# Patient Record
Sex: FEMALE | Race: OTHER | HISPANIC OR LATINO
[De-identification: names, ages, dates, MRNs, and addresses within clinical notes are randomized per-mention and may not be internally consistent; named-entity substitution may affect disease eponyms.]

---

## 2021-01-28 DIAGNOSIS — Z01.818 ENCOUNTER FOR OTHER PREPROCEDURAL EXAMINATION: ICD-10-CM

## 2021-01-28 PROBLEM — Z00.00 ENCOUNTER FOR PREVENTIVE HEALTH EXAMINATION: Status: ACTIVE | Noted: 2021-01-28

## 2021-01-29 ENCOUNTER — APPOINTMENT (OUTPATIENT)
Dept: DISASTER EMERGENCY | Facility: CLINIC | Age: 71
End: 2021-01-29

## 2021-01-29 VITALS
SYSTOLIC BLOOD PRESSURE: 127 MMHG | HEIGHT: 66 IN | HEART RATE: 72 BPM | RESPIRATION RATE: 20 BRPM | WEIGHT: 181.44 LBS | DIASTOLIC BLOOD PRESSURE: 78 MMHG | TEMPERATURE: 98 F | OXYGEN SATURATION: 98 %

## 2021-01-30 LAB — SARS-COV-2 N GENE NPH QL NAA+PROBE: NOT DETECTED

## 2021-01-31 ENCOUNTER — TRANSCRIPTION ENCOUNTER (OUTPATIENT)
Age: 71
End: 2021-01-31

## 2021-02-01 ENCOUNTER — INPATIENT (INPATIENT)
Facility: HOSPITAL | Age: 71
LOS: 3 days | Discharge: HOME | DRG: 454 | End: 2021-02-05
Attending: ORTHOPAEDIC SURGERY | Admitting: ORTHOPAEDIC SURGERY
Payer: MEDICARE

## 2021-02-01 DIAGNOSIS — I10 ESSENTIAL (PRIMARY) HYPERTENSION: ICD-10-CM

## 2021-02-01 DIAGNOSIS — M48.061 SPINAL STENOSIS, LUMBAR REGION WITHOUT NEUROGENIC CLAUDICATION: ICD-10-CM

## 2021-02-01 DIAGNOSIS — K63.5 POLYP OF COLON: ICD-10-CM

## 2021-02-01 DIAGNOSIS — E78.5 HYPERLIPIDEMIA, UNSPECIFIED: ICD-10-CM

## 2021-02-01 DIAGNOSIS — Z98.890 OTHER SPECIFIED POSTPROCEDURAL STATES: Chronic | ICD-10-CM

## 2021-02-01 DIAGNOSIS — E11.9 TYPE 2 DIABETES MELLITUS WITHOUT COMPLICATIONS: ICD-10-CM

## 2021-02-01 DIAGNOSIS — Z90.710 ACQUIRED ABSENCE OF BOTH CERVIX AND UTERUS: Chronic | ICD-10-CM

## 2021-02-01 DIAGNOSIS — Z90.49 ACQUIRED ABSENCE OF OTHER SPECIFIED PARTS OF DIGESTIVE TRACT: Chronic | ICD-10-CM

## 2021-02-01 LAB
GLUCOSE BLDC GLUCOMTR-MCNC: 147 MG/DL — HIGH (ref 70–99)
GLUCOSE BLDC GLUCOMTR-MCNC: 228 MG/DL — HIGH (ref 70–99)
GLUCOSE BLDC GLUCOMTR-MCNC: 253 MG/DL — HIGH (ref 70–99)
GLUCOSE BLDC GLUCOMTR-MCNC: 88 MG/DL — SIGNIFICANT CHANGE UP (ref 70–99)

## 2021-02-01 RX ORDER — DEXTROSE 50 % IN WATER 50 %
12.5 SYRINGE (ML) INTRAVENOUS ONCE
Refills: 0 | Status: DISCONTINUED | OUTPATIENT
Start: 2021-02-01 | End: 2021-02-01

## 2021-02-01 RX ORDER — INSULIN LISPRO 100/ML
VIAL (ML) SUBCUTANEOUS ONCE
Refills: 0 | Status: DISCONTINUED | OUTPATIENT
Start: 2021-02-01 | End: 2021-02-01

## 2021-02-01 RX ORDER — DEXTROSE 50 % IN WATER 50 %
25 SYRINGE (ML) INTRAVENOUS ONCE
Refills: 0 | Status: DISCONTINUED | OUTPATIENT
Start: 2021-02-01 | End: 2021-02-05

## 2021-02-01 RX ORDER — DEXTROSE 50 % IN WATER 50 %
15 SYRINGE (ML) INTRAVENOUS ONCE
Refills: 0 | Status: DISCONTINUED | OUTPATIENT
Start: 2021-02-01 | End: 2021-02-05

## 2021-02-01 RX ORDER — BUPIVACAINE 13.3 MG/ML
20 INJECTION, SUSPENSION, LIPOSOMAL INFILTRATION ONCE
Refills: 0 | Status: DISCONTINUED | OUTPATIENT
Start: 2021-02-01 | End: 2021-02-05

## 2021-02-01 RX ORDER — DEXTROSE 50 % IN WATER 50 %
15 SYRINGE (ML) INTRAVENOUS ONCE
Refills: 0 | Status: DISCONTINUED | OUTPATIENT
Start: 2021-02-01 | End: 2021-02-01

## 2021-02-01 RX ORDER — DEXTROSE 50 % IN WATER 50 %
25 SYRINGE (ML) INTRAVENOUS ONCE
Refills: 0 | Status: DISCONTINUED | OUTPATIENT
Start: 2021-02-01 | End: 2021-02-01

## 2021-02-01 RX ORDER — ONDANSETRON 8 MG/1
4 TABLET, FILM COATED ORAL EVERY 6 HOURS
Refills: 0 | Status: DISCONTINUED | OUTPATIENT
Start: 2021-02-01 | End: 2021-02-01

## 2021-02-01 RX ORDER — ATORVASTATIN CALCIUM 80 MG/1
40 TABLET, FILM COATED ORAL DAILY
Refills: 0 | Status: DISCONTINUED | OUTPATIENT
Start: 2021-02-01 | End: 2021-02-01

## 2021-02-01 RX ORDER — DEXTROSE 50 % IN WATER 50 %
12.5 SYRINGE (ML) INTRAVENOUS ONCE
Refills: 0 | Status: DISCONTINUED | OUTPATIENT
Start: 2021-02-01 | End: 2021-02-05

## 2021-02-01 RX ORDER — OXYCODONE HYDROCHLORIDE 5 MG/1
5 TABLET ORAL EVERY 4 HOURS
Refills: 0 | Status: DISCONTINUED | OUTPATIENT
Start: 2021-02-01 | End: 2021-02-01

## 2021-02-01 RX ORDER — METHOCARBAMOL 500 MG/1
500 TABLET, FILM COATED ORAL EVERY 8 HOURS
Refills: 0 | Status: DISCONTINUED | OUTPATIENT
Start: 2021-02-01 | End: 2021-02-05

## 2021-02-01 RX ORDER — HYDROMORPHONE HYDROCHLORIDE 2 MG/ML
0.5 INJECTION INTRAMUSCULAR; INTRAVENOUS; SUBCUTANEOUS
Refills: 0 | Status: COMPLETED | OUTPATIENT
Start: 2021-02-01 | End: 2021-02-01

## 2021-02-01 RX ORDER — GABAPENTIN 400 MG/1
600 CAPSULE ORAL THREE TIMES A DAY
Refills: 0 | Status: DISCONTINUED | OUTPATIENT
Start: 2021-02-01 | End: 2021-02-05

## 2021-02-01 RX ORDER — NALOXONE HYDROCHLORIDE 4 MG/.1ML
0.1 SPRAY NASAL
Refills: 0 | Status: DISCONTINUED | OUTPATIENT
Start: 2021-02-01 | End: 2021-02-05

## 2021-02-01 RX ORDER — ATORVASTATIN CALCIUM 80 MG/1
40 TABLET, FILM COATED ORAL AT BEDTIME
Refills: 0 | Status: DISCONTINUED | OUTPATIENT
Start: 2021-02-01 | End: 2021-02-05

## 2021-02-01 RX ORDER — HYDROMORPHONE HYDROCHLORIDE 2 MG/ML
0.5 INJECTION INTRAMUSCULAR; INTRAVENOUS; SUBCUTANEOUS
Refills: 0 | Status: DISCONTINUED | OUTPATIENT
Start: 2021-02-01 | End: 2021-02-02

## 2021-02-01 RX ORDER — CEFAZOLIN SODIUM 1 G
2000 VIAL (EA) INJECTION ONCE
Refills: 0 | Status: DISCONTINUED | OUTPATIENT
Start: 2021-02-01 | End: 2021-02-01

## 2021-02-01 RX ORDER — PANTOPRAZOLE SODIUM 20 MG/1
40 TABLET, DELAYED RELEASE ORAL
Refills: 0 | Status: DISCONTINUED | OUTPATIENT
Start: 2021-02-01 | End: 2021-02-05

## 2021-02-01 RX ORDER — PROCHLORPERAZINE MALEATE 5 MG
10 TABLET ORAL EVERY 8 HOURS
Refills: 0 | Status: DISCONTINUED | OUTPATIENT
Start: 2021-02-01 | End: 2021-02-05

## 2021-02-01 RX ORDER — CEFAZOLIN SODIUM 1 G
2000 VIAL (EA) INJECTION EVERY 8 HOURS
Refills: 0 | Status: COMPLETED | OUTPATIENT
Start: 2021-02-01 | End: 2021-02-01

## 2021-02-01 RX ORDER — ACETAMINOPHEN 500 MG
1000 TABLET ORAL ONCE
Refills: 0 | Status: DISCONTINUED | OUTPATIENT
Start: 2021-02-01 | End: 2021-02-01

## 2021-02-01 RX ORDER — ACETAMINOPHEN 500 MG
1000 TABLET ORAL EVERY 8 HOURS
Refills: 0 | Status: DISCONTINUED | OUTPATIENT
Start: 2021-02-01 | End: 2021-02-05

## 2021-02-01 RX ORDER — HYDROMORPHONE HYDROCHLORIDE 2 MG/ML
0.5 INJECTION INTRAMUSCULAR; INTRAVENOUS; SUBCUTANEOUS EVERY 4 HOURS
Refills: 0 | Status: DISCONTINUED | OUTPATIENT
Start: 2021-02-01 | End: 2021-02-01

## 2021-02-01 RX ORDER — SODIUM CHLORIDE 9 MG/ML
1000 INJECTION, SOLUTION INTRAVENOUS
Refills: 0 | Status: DISCONTINUED | OUTPATIENT
Start: 2021-02-01 | End: 2021-02-05

## 2021-02-01 RX ORDER — SODIUM CHLORIDE 9 MG/ML
1000 INJECTION, SOLUTION INTRAVENOUS
Refills: 0 | Status: DISCONTINUED | OUTPATIENT
Start: 2021-02-01 | End: 2021-02-03

## 2021-02-01 RX ORDER — SODIUM CHLORIDE 9 MG/ML
500 INJECTION, SOLUTION INTRAVENOUS ONCE
Refills: 0 | Status: COMPLETED | OUTPATIENT
Start: 2021-02-01 | End: 2021-02-01

## 2021-02-01 RX ORDER — HYDROMORPHONE HYDROCHLORIDE 2 MG/ML
30 INJECTION INTRAMUSCULAR; INTRAVENOUS; SUBCUTANEOUS
Refills: 0 | Status: DISCONTINUED | OUTPATIENT
Start: 2021-02-01 | End: 2021-02-02

## 2021-02-01 RX ORDER — ONDANSETRON 8 MG/1
4 TABLET, FILM COATED ORAL EVERY 6 HOURS
Refills: 0 | Status: DISCONTINUED | OUTPATIENT
Start: 2021-02-01 | End: 2021-02-05

## 2021-02-01 RX ORDER — PROCHLORPERAZINE MALEATE 5 MG
10 TABLET ORAL ONCE
Refills: 0 | Status: DISCONTINUED | OUTPATIENT
Start: 2021-02-01 | End: 2021-02-05

## 2021-02-01 RX ORDER — INSULIN LISPRO 100/ML
VIAL (ML) SUBCUTANEOUS
Refills: 0 | Status: DISCONTINUED | OUTPATIENT
Start: 2021-02-01 | End: 2021-02-05

## 2021-02-01 RX ORDER — GLUCAGON INJECTION, SOLUTION 0.5 MG/.1ML
1 INJECTION, SOLUTION SUBCUTANEOUS ONCE
Refills: 0 | Status: DISCONTINUED | OUTPATIENT
Start: 2021-02-01 | End: 2021-02-05

## 2021-02-01 RX ADMIN — SODIUM CHLORIDE 2000 MILLILITER(S): 9 INJECTION, SOLUTION INTRAVENOUS at 11:45

## 2021-02-01 RX ADMIN — Medication 1000 MILLIGRAM(S): at 22:59

## 2021-02-01 RX ADMIN — Medication 4: at 22:48

## 2021-02-01 RX ADMIN — HYDROMORPHONE HYDROCHLORIDE 30 MILLILITER(S): 2 INJECTION INTRAMUSCULAR; INTRAVENOUS; SUBCUTANEOUS at 16:38

## 2021-02-01 RX ADMIN — HYDROMORPHONE HYDROCHLORIDE 0.5 MILLIGRAM(S): 2 INJECTION INTRAMUSCULAR; INTRAVENOUS; SUBCUTANEOUS at 13:36

## 2021-02-01 RX ADMIN — Medication 100 MILLIGRAM(S): at 22:59

## 2021-02-01 RX ADMIN — Medication 6: at 16:49

## 2021-02-01 RX ADMIN — Medication 100 MILLIGRAM(S): at 16:00

## 2021-02-01 RX ADMIN — METHOCARBAMOL 500 MILLIGRAM(S): 500 TABLET, FILM COATED ORAL at 22:59

## 2021-02-01 RX ADMIN — ATORVASTATIN CALCIUM 40 MILLIGRAM(S): 80 TABLET, FILM COATED ORAL at 22:59

## 2021-02-01 RX ADMIN — HYDROMORPHONE HYDROCHLORIDE 0.5 MILLIGRAM(S): 2 INJECTION INTRAMUSCULAR; INTRAVENOUS; SUBCUTANEOUS at 12:15

## 2021-02-01 NOTE — H&P ADULT - HISTORY OF PRESENT ILLNESS
70F with low back pain x several months presents for surgery today. Pt notes she has pain down bilateral lower extremities, right greater than left. She ambulates with no assistance at baseline. She has tried PT and injections that do not improve her pain. Pt endorses intermittent numbness and tingling to her legs that comes and goes. She presents for L5-S1 Lami, PSF today.

## 2021-02-01 NOTE — H&P ADULT - NSICDXPASTSURGICALHX_GEN_ALL_CORE_FT
PAST SURGICAL HISTORY:  H/O abdominal hysterectomy 30y ago    History of appendectomy 2012    History of back surgery 2011, lumbar

## 2021-02-01 NOTE — PROGRESS NOTE ADULT - SUBJECTIVE AND OBJECTIVE BOX
Ortho Post Op Check    Procedure: L5-S1 lami/PLIF  Surgeon: Dr. Evangelista     Patient seen and examined in the PACU   Patient reports pain localized to the lumbar spine   Denies CP, SOB, N/V, numbness/tingling     Vital Signs Last 24 Hrs  T(C): 36.4 (02-01-21 @ 11:28), Max: 36.4 (02-01-21 @ 11:28)  T(F): 97.5 (02-01-21 @ 11:28), Max: 97.5 (02-01-21 @ 11:28)  HR: 68 (02-01-21 @ 13:28) (67 - 73)  BP: 108/55 (02-01-21 @ 13:28) (82/47 - 111/57)  BP(mean): 74 (02-01-21 @ 13:28) (60 - 80)  RR: 13 (02-01-21 @ 13:28) (13 - 16)  SpO2: 99% (02-01-21 @ 13:28) (95% - 99%)  AVSS    General: Pt Alert and oriented, NAD  Dressing C/D/I: lumbar dressing in tact- HV x2   Fisher in place   Pulses: 2+ DP pulses in tact   Sensation: intact to light touch   Motor: EHL/FHL/TA/GS 5/5 bilateral LE         A/P: 70yFemale POD#0 s/p L5-S1 lami/PLIF     - Stable in PACU   - c/w IV and PO Pain Control  - DVT ppx: SCDs  - Post op abx: ancef 2g IV x2 post op   - PT, WBS: WBAT   -monitor drain outputs     Ortho Pager [phone]

## 2021-02-01 NOTE — PROGRESS NOTE ADULT - SUBJECTIVE AND OBJECTIVE BOX
Consulted called to Dr. Soliman Pain management     Recommendations recieved over the phone  Patient will be formally seen by Pain management in AM    Overnight recs:  PCA pump: 0.2mg demand dose; 6 min lockout; 0.5mg breakthrough dosing q2 hrs; 8mg 4 hr limit,   c/w home gabapentin 600mg 3x daily PRN     Patient seen and examined by myself at bedside in PACU  see previous note

## 2021-02-01 NOTE — H&P ADULT - NSHPPHYSICALEXAM_GEN_ALL_CORE
PE: Decreased ROM to lumbar spine secondary to pain.  MSK: No deformity or open wounds. No rashes or lesions. EHL/TA/GS/FHL 5/5 BLE. Sensation intact and equal BLE. Skin warm and well perfused. DP palpable BLE. Cap refill brisk.   Rest of PE per medical clearance.

## 2021-02-01 NOTE — H&P ADULT - NSICDXPASTMEDICALHX_GEN_ALL_CORE_FT
PAST MEDICAL HISTORY:  Colon polyp     Diabetes     HLD (hyperlipidemia)     HTN (hypertension)

## 2021-02-02 ENCOUNTER — TRANSCRIPTION ENCOUNTER (OUTPATIENT)
Age: 71
End: 2021-02-02

## 2021-02-02 DIAGNOSIS — E11.9 TYPE 2 DIABETES MELLITUS WITHOUT COMPLICATIONS: ICD-10-CM

## 2021-02-02 DIAGNOSIS — E78.00 PURE HYPERCHOLESTEROLEMIA, UNSPECIFIED: ICD-10-CM

## 2021-02-02 DIAGNOSIS — D62 ACUTE POSTHEMORRHAGIC ANEMIA: ICD-10-CM

## 2021-02-02 DIAGNOSIS — Z98.890 OTHER SPECIFIED POSTPROCEDURAL STATES: ICD-10-CM

## 2021-02-02 DIAGNOSIS — I10 ESSENTIAL (PRIMARY) HYPERTENSION: ICD-10-CM

## 2021-02-02 LAB
A1C WITH ESTIMATED AVERAGE GLUCOSE RESULT: 7.7 % — HIGH (ref 4–5.6)
ANION GAP SERPL CALC-SCNC: 11 MMOL/L — SIGNIFICANT CHANGE UP (ref 5–17)
BASOPHILS # BLD AUTO: 0.02 K/UL — SIGNIFICANT CHANGE UP (ref 0–0.2)
BASOPHILS NFR BLD AUTO: 0.2 % — SIGNIFICANT CHANGE UP (ref 0–2)
BUN SERPL-MCNC: 9 MG/DL — SIGNIFICANT CHANGE UP (ref 7–23)
CALCIUM SERPL-MCNC: 8.5 MG/DL — SIGNIFICANT CHANGE UP (ref 8.4–10.5)
CHLORIDE SERPL-SCNC: 105 MMOL/L — SIGNIFICANT CHANGE UP (ref 96–108)
CO2 SERPL-SCNC: 25 MMOL/L — SIGNIFICANT CHANGE UP (ref 22–31)
CREAT SERPL-MCNC: 0.69 MG/DL — SIGNIFICANT CHANGE UP (ref 0.5–1.3)
EOSINOPHIL # BLD AUTO: 0 K/UL — SIGNIFICANT CHANGE UP (ref 0–0.5)
EOSINOPHIL NFR BLD AUTO: 0 % — SIGNIFICANT CHANGE UP (ref 0–6)
ESTIMATED AVERAGE GLUCOSE: 174 MG/DL — HIGH (ref 68–114)
GLUCOSE BLDC GLUCOMTR-MCNC: 138 MG/DL — HIGH (ref 70–99)
GLUCOSE BLDC GLUCOMTR-MCNC: 198 MG/DL — HIGH (ref 70–99)
GLUCOSE BLDC GLUCOMTR-MCNC: 217 MG/DL — HIGH (ref 70–99)
GLUCOSE BLDC GLUCOMTR-MCNC: 290 MG/DL — HIGH (ref 70–99)
GLUCOSE SERPL-MCNC: 136 MG/DL — HIGH (ref 70–99)
HCT VFR BLD CALC: 29.4 % — LOW (ref 34.5–45)
HGB BLD-MCNC: 9.8 G/DL — LOW (ref 11.5–15.5)
IMM GRANULOCYTES NFR BLD AUTO: 0.5 % — SIGNIFICANT CHANGE UP (ref 0–1.5)
LYMPHOCYTES # BLD AUTO: 1.69 K/UL — SIGNIFICANT CHANGE UP (ref 1–3.3)
LYMPHOCYTES # BLD AUTO: 16.9 % — SIGNIFICANT CHANGE UP (ref 13–44)
MCHC RBC-ENTMCNC: 31.7 PG — SIGNIFICANT CHANGE UP (ref 27–34)
MCHC RBC-ENTMCNC: 33.3 GM/DL — SIGNIFICANT CHANGE UP (ref 32–36)
MCV RBC AUTO: 95.1 FL — SIGNIFICANT CHANGE UP (ref 80–100)
MONOCYTES # BLD AUTO: 0.77 K/UL — SIGNIFICANT CHANGE UP (ref 0–0.9)
MONOCYTES NFR BLD AUTO: 7.7 % — SIGNIFICANT CHANGE UP (ref 2–14)
NEUTROPHILS # BLD AUTO: 7.48 K/UL — HIGH (ref 1.8–7.4)
NEUTROPHILS NFR BLD AUTO: 74.7 % — SIGNIFICANT CHANGE UP (ref 43–77)
NRBC # BLD: 0 /100 WBCS — SIGNIFICANT CHANGE UP (ref 0–0)
PLATELET # BLD AUTO: 217 K/UL — SIGNIFICANT CHANGE UP (ref 150–400)
POTASSIUM SERPL-MCNC: 4 MMOL/L — SIGNIFICANT CHANGE UP (ref 3.5–5.3)
POTASSIUM SERPL-SCNC: 4 MMOL/L — SIGNIFICANT CHANGE UP (ref 3.5–5.3)
RBC # BLD: 3.09 M/UL — LOW (ref 3.8–5.2)
RBC # FLD: 13 % — SIGNIFICANT CHANGE UP (ref 10.3–14.5)
SODIUM SERPL-SCNC: 141 MMOL/L — SIGNIFICANT CHANGE UP (ref 135–145)
WBC # BLD: 10.01 K/UL — SIGNIFICANT CHANGE UP (ref 3.8–10.5)
WBC # FLD AUTO: 10.01 K/UL — SIGNIFICANT CHANGE UP (ref 3.8–10.5)

## 2021-02-02 PROCEDURE — 99223 1ST HOSP IP/OBS HIGH 75: CPT | Mod: GC

## 2021-02-02 RX ORDER — HYDROMORPHONE HYDROCHLORIDE 2 MG/ML
0.5 INJECTION INTRAMUSCULAR; INTRAVENOUS; SUBCUTANEOUS EVERY 4 HOURS
Refills: 0 | Status: DISCONTINUED | OUTPATIENT
Start: 2021-02-02 | End: 2021-02-05

## 2021-02-02 RX ORDER — DIPHENHYDRAMINE HCL 50 MG
25 CAPSULE ORAL ONCE
Refills: 0 | Status: COMPLETED | OUTPATIENT
Start: 2021-02-02 | End: 2021-02-02

## 2021-02-02 RX ORDER — OXYCODONE HYDROCHLORIDE 5 MG/1
10 TABLET ORAL EVERY 4 HOURS
Refills: 0 | Status: DISCONTINUED | OUTPATIENT
Start: 2021-02-02 | End: 2021-02-03

## 2021-02-02 RX ORDER — OXYCODONE HYDROCHLORIDE 5 MG/1
5 TABLET ORAL EVERY 4 HOURS
Refills: 0 | Status: DISCONTINUED | OUTPATIENT
Start: 2021-02-02 | End: 2021-02-03

## 2021-02-02 RX ADMIN — PANTOPRAZOLE SODIUM 40 MILLIGRAM(S): 20 TABLET, DELAYED RELEASE ORAL at 04:55

## 2021-02-02 RX ADMIN — ATORVASTATIN CALCIUM 40 MILLIGRAM(S): 80 TABLET, FILM COATED ORAL at 22:48

## 2021-02-02 RX ADMIN — METHOCARBAMOL 500 MILLIGRAM(S): 500 TABLET, FILM COATED ORAL at 13:12

## 2021-02-02 RX ADMIN — METHOCARBAMOL 500 MILLIGRAM(S): 500 TABLET, FILM COATED ORAL at 04:56

## 2021-02-02 RX ADMIN — Medication 4: at 22:35

## 2021-02-02 RX ADMIN — Medication 6: at 17:25

## 2021-02-02 RX ADMIN — Medication 1000 MILLIGRAM(S): at 22:48

## 2021-02-02 RX ADMIN — Medication 1000 MILLIGRAM(S): at 13:12

## 2021-02-02 RX ADMIN — Medication 2: at 12:24

## 2021-02-02 RX ADMIN — OXYCODONE HYDROCHLORIDE 5 MILLIGRAM(S): 5 TABLET ORAL at 22:48

## 2021-02-02 RX ADMIN — Medication 25 MILLIGRAM(S): at 14:29

## 2021-02-02 RX ADMIN — Medication 1000 MILLIGRAM(S): at 04:55

## 2021-02-02 RX ADMIN — METHOCARBAMOL 500 MILLIGRAM(S): 500 TABLET, FILM COATED ORAL at 22:48

## 2021-02-02 RX ADMIN — OXYCODONE HYDROCHLORIDE 5 MILLIGRAM(S): 5 TABLET ORAL at 17:27

## 2021-02-02 NOTE — PHYSICAL THERAPY INITIAL EVALUATION ADULT - PERTINENT HX OF CURRENT PROBLEM, REHAB EVAL
70F with low back pain x several months presents for surgery today. Pt notes she has pain down bilateral lower extremities, right greater than left. She ambulates with no assistance at baseline. She has tried PT and injections that do not improve her pain. Pt endorses intermittent numbness and tingling to her legs that comes and goes.

## 2021-02-02 NOTE — DISCHARGE NOTE PROVIDER - NSDCCPCAREPLAN_GEN_ALL_CORE_FT
PRINCIPAL DISCHARGE DIAGNOSIS  Diagnosis: Spinal stenosis of lumbar region, unspecified whether neurogenic claudication present  Assessment and Plan of Treatment: improvement s/p Lami/PLIF L5-S1

## 2021-02-02 NOTE — PHYSICAL THERAPY INITIAL EVALUATION ADULT - CRITERIA FOR SKILLED THERAPEUTIC INTERVENTIONS
impairments found/functional limitations in following categories/risk reduction/prevention/therapy frequency/predicted duration of therapy intervention/anticipated equipment needs at discharge

## 2021-02-02 NOTE — PROGRESS NOTE ADULT - SUBJECTIVE AND OBJECTIVE BOX
Ortho Note    Procedure: L5-S1 lami/PLIF  Surgeon: Dr. Evangelista     pt seen and examined at bedside   no issues overnight  endorsing some mild pain relieved w oral analgesia   Denies CP, SOB, N/V, numbness/tingling     Vital Signs Last 24 Hrs  T(C): 36.4 (02-01-21 @ 11:28), Max: 36.4 (02-01-21 @ 11:28)  T(F): 97.5 (02-01-21 @ 11:28), Max: 97.5 (02-01-21 @ 11:28)  HR: 68 (02-01-21 @ 13:28) (67 - 73)  BP: 108/55 (02-01-21 @ 13:28) (82/47 - 111/57)  BP(mean): 74 (02-01-21 @ 13:28) (60 - 80)  RR: 13 (02-01-21 @ 13:28) (13 - 16)  SpO2: 99% (02-01-21 @ 13:28) (95% - 99%)  AVSS    General: Pt Alert and oriented, NAD  Dressing C/D/I: lumbar dressing in tact- HV x2   Fisher in place   Pulses: 2+ DP pulses in tact   Sensation: intact to light touch   Motor: EHL/FHL/TA/GS 5/5 bilateral LE         A/P: 70yFemale POD#1 s/p L5-S1 lami/PLIF     - Stable in PACU   - c/w IV and PO Pain Control  - DVT ppx: SCDs  - Post op abx: ancef 2g IV x2 post op   - PT, WBS: WBAT   -monitor drain outputs     Ortho Pager [phone]

## 2021-02-02 NOTE — DISCHARGE NOTE PROVIDER - NSDCFUADDINST_GEN_ALL_CORE_FT
No strenuous activity (bending/twisting), heavy lifting, driving or returning to work until cleared by MD.  Change dressing daily with gauze/tape till post-op day #5, then leave incision open to air.  You may shower post-op day#5, keep incision clean and dry.   Try to have regular bowel movements, take stool softener or laxative if necessary.  May apply ice to affected areas to decrease swelling.  Call to schedule an appt with Dr. Evangelista for follow up, if you have staples or sutures they will be removed in office.  Contact your doctor if you experience: fever greater than 101.5, chills, chest pain, difficulty breathing, redness or excessive drainage around the incision, other concerns.  Follow up with your primary care provider.

## 2021-02-02 NOTE — DISCHARGE NOTE PROVIDER - NSDCMRMEDTOKEN_GEN_ALL_CORE_FT
atorvastatin 40 mg oral tablet: 1 tab(s) orally once a day  gabapentin 600 mg oral tablet: 1 tab(s) orally 3 times a day, As Needed  glimepiride 1 mg oral tablet: 1 tab(s) orally once a day  lisinopril 20 mg oral tablet: 1 tab(s) orally once a day  metFORMIN 1000 mg oral tablet: 1 tab(s) orally 2 times a day  pantoprazole 40 mg oral delayed release tablet: 1 tab(s) orally once a day   acetaminophen 500 mg oral tablet: 2 tab(s) orally every 8 hours for one week, then as needed  atorvastatin 40 mg oral tablet: 1 tab(s) orally once a day  gabapentin 600 mg oral tablet: 1 tab(s) orally 3 times a day, As Needed  glimepiride 1 mg oral tablet: 1 tab(s) orally once a day  lisinopril 20 mg oral tablet: 1 tab(s) orally once a day  metFORMIN 1000 mg oral tablet: 1 tab(s) orally 2 times a day  methocarbamol 500 mg oral tablet: 1 tab(s) orally every 8 hours  morphine 15 mg oral tablet: 0.5  to 1  tab(s) orally every 4 hours, As Needed Moderate to-Severe Pain (7 - 10) MDD:6  pantoprazole 40 mg oral delayed release tablet: 1 tab(s) orally once a day  polyethylene glycol 3350 oral powder for reconstitution: 17 gram(s) orally once a day  senna oral tablet: 2 tab(s) orally once a day (at bedtime)

## 2021-02-02 NOTE — PHYSICAL THERAPY INITIAL EVALUATION ADULT - ADDITIONAL COMMENTS
Pt lives with her family in a private house, [address].  Pt denies any use od Assistive Device and reports 1 recent fall when she stepped into a hole in the ground at work.

## 2021-02-02 NOTE — DISCHARGE NOTE PROVIDER - NSDCCPTREATMENT_GEN_ALL_CORE_FT
PRINCIPAL PROCEDURE  Procedure: TLIF, 1 level  Findings and Treatment: L5-S1       PRINCIPAL PROCEDURE  Procedure: TLIF, 1 level  Findings and Treatment: spinal stenosis

## 2021-02-02 NOTE — DISCHARGE NOTE PROVIDER - CARE PROVIDER_API CALL
Brittni Evangelista)  Orthopaedic Surgery  [address]  [place], [place] [address]  Phone: [phone]  Fax: [phone]  Follow Up Time: 2 weeks

## 2021-02-02 NOTE — PROGRESS NOTE ADULT - SUBJECTIVE AND OBJECTIVE BOX
Ortho Note    Pt comfortable without complaints, pain controlled.   Denies CP, SOB, N/V, numbness/tingling down le b/l     Vital Signs Last 24 Hrs  AVSS    General: Pt Alert and oriented, NAD  DSG foam dressing C/D/I HV x 2 in place  Pulses: DP/PT pulses palpable b/l   Sensation: Lower extremity sensation intact B/L   Motor: EHL/FHL/TA/GS 5/5 b/l le     drain output:  HV A: 60cc overnight/ 90cc over 24hrs  HV B: 230cc overnight/ 260cc over 24hrs     A/P: 70yFemale POD# 1 s/p lami/tlif/psf L5-S1  - Stable  - Pain Control, dc PCA transition to oral regimen per PM   - DVT ppx: SCDS   - PT, WBS:  WBAT   - Monitor Drain output   - trend labs   - Discharge pending PT clearance and drain output    Ortho Pager [phone]

## 2021-02-02 NOTE — CONSULT NOTE ADULT - SUBJECTIVE AND OBJECTIVE BOX
Pain Management Consult Note - [facility] [phone]    Chief Complaint: Back pain    HPI:  70F with low back pain x several months presented for surgery. Pt notes she has pain down bilateral lower extremities, right greater than left. She ambulates with no assistance at baseline. She has tried PT and injections that do not improve her pain. Pt endorses intermittent numbness and tingling to her legs that comes and goes. She presented for L5-S1 Lami.     Pain is ___ sharp ____dull ___burning _x__achy ___ Intensity: ____ mild ___mod ___severe     Location ____surgical site ____cervical ___x__lumbar ____abd ____upper ext____lower ext    Worse with ____activity __x__movement _____physical therapy___ Rest    Improved with _x___medication ____rest ____physical therapy    ROS: Const:  ___febrile   Eyes:___ENT:___CV: ___chest pain  Resp: ____sob  GI:___nausea ___vomiting ___abd pain ___npo ___clears __full diet __bm  :___ Musk: _x__pain ___spasm  Skin:___ Neuro:  ___sedation___confusion___ numbness ___weakness ___paresth  Psych:__anxiety  Endo:___ Heme:___Allergy:_________, ___all others reviewed and negative    PAST MEDICAL & SURGICAL HISTORY:  Colon polyp    HLD (hyperlipidemia)    Diabetes    HTN (hypertension)    History of back surgery  2011, lumbar    History of appendectomy  2012    H/O abdominal hysterectomy  30y ago        SH: ___Tobacco   ___Alcohol                          FH:FAMILY HISTORY:      acetaminophen   Tablet .. 1000 milliGRAM(s) Oral every 8 hours  atorvastatin 40 milliGRAM(s) Oral at bedtime  BUpivacaine liposome 1.3% Injectable (no eMAR) 20 milliLiter(s) Local Injection Once  dextrose 40% Gel 15 Gram(s) Oral once  dextrose 5%. 1000 milliLiter(s) IV Continuous <Continuous>  dextrose 5%. 1000 milliLiter(s) IV Continuous <Continuous>  dextrose 50% Injectable 25 Gram(s) IV Push once  dextrose 50% Injectable 12.5 Gram(s) IV Push once  dextrose 50% Injectable 25 Gram(s) IV Push once  gabapentin 600 milliGRAM(s) Oral three times a day PRN  glucagon  Injectable 1 milliGRAM(s) IntraMuscular Once  HYDROmorphone  Injectable 0.5 milliGRAM(s) IV Push every 4 hours PRN  insulin lispro (ADMELOG) corrective regimen sliding scale   SubCutaneous Before meals and at bedtime  lactated ringers. 1000 milliLiter(s) IV Continuous <Continuous>  methocarbamol 500 milliGRAM(s) Oral every 8 hours  naloxone Injectable 0.1 milliGRAM(s) IV Push every 3 minutes PRN  ondansetron   Disintegrating Tablet 4 milliGRAM(s) Oral every 6 hours PRN  oxyCODONE    IR 5 milliGRAM(s) Oral every 4 hours PRN  oxyCODONE    IR 10 milliGRAM(s) Oral every 4 hours PRN  pantoprazole    Tablet 40 milliGRAM(s) Oral before breakfast  prochlorperazine   Injectable 10 milliGRAM(s) IV Push Once PRN  prochlorperazine   Injectable 10 milliGRAM(s) IV Push every 8 hours PRN      T(C): 36.5 (02-02-21 @ 08:40), Max: 37.3 (02-01-21 @ 20:30)  HR: 70 (02-02-21 @ 08:40) (64 - 84)  BP: 107/60 (02-02-21 @ 08:40) (93/51 - 120/65)  RR: 18 (02-02-21 @ 08:40) (11 - 18)  SpO2: 97% (02-02-21 @ 08:40) (97% - 100%)  Wt(kg): --    PHYSICAL EXAM:  Gen Appearance: _x__no acute distress __x_appropriate        Neuro: _x__SILT feet__x__ EOM Intact Psych: AAOX_3_, _x__mood/affect appropriate        Eyes: __x_conjunctiva WNL  ___x__ Pupils equal and round        ENT: _x_ears and nose atraumatic_x__ Hearing grossly intact        Neck: _x__trachea midline, no visible masses __x_thyroid without palpable mass    Resp: __x_Nml WOB___x_No tactile fremitus _x__clear to auscultation    Cardio: ___extremities free from edema __x__pedal pulses palpable    GI/Abdomen: _x__soft ___x__ Nontender____x__Nondistended_____HSM    Lymphatic: _x__no palpable nodes in neck  __x_no palpable nodes calves and feet    Skin/Wound: _x__Incision, __x_Dressing c/d/i,   ____surrounding tissues soft,  ___drain/chest tube present____    Muscular: EHL _5__/5  Gastrocnemius_5__/5    __x_absent clubbing/cyanosis      ASSESSMENT: This is a 70y old Female with a history of   M48.06    Handoff    MEWS Score    Colon polyp    HLD (hyperlipidemia)    Diabetes    HTN (hypertension)    TLIF, 1 level    Spinal stenosis of lumbar region, unspecified whether neurogenic claudication present    HTN (hypertension)    HLD (hyperlipidemia)    Diabetes    Colon polyp    Spinal stenosis of lumbar region, unspecified whether neurogenic claudication present    TLIF, 1 level    History of back surgery    History of appendectomy    H/O abdominal hysterectomy    SysAdmin_VstLnk
Patient is a 70y old  Female who presents with a chief complaint of Back pain (02 Feb 2021 11:20)      HPI:  70F with low back pain x several months presents for surgery today. Pt notes she has pain down bilateral lower extremities, right greater than left. She ambulates with no assistance at baseline. She has tried PT and injections that do not improve her pain. Pt endorses intermittent numbness and tingling to her legs that comes and goes. She presents for L5-S1 Lami, PSF today.  (01 Feb 2021 08:10)      PAST MEDICAL & SURGICAL HISTORY:  Colon polyp    HLD (hyperlipidemia)    Diabetes    HTN (hypertension)    History of back surgery  2011, lumbar    History of appendectomy  2012    H/O abdominal hysterectomy  30y ago        FAMILY HISTORY:      SOCIAL HISTORY:  Smoking Status: [ ] Current, [ ] Former, [ ] Never  Pack Years:    MEDICATIONS:  Pulmonary:    Antimicrobials:    Anticoagulants:    Onc:    GI/:  pantoprazole    Tablet 40 milliGRAM(s) Oral before breakfast    Endocrine:  atorvastatin 40 milliGRAM(s) Oral at bedtime  dextrose 40% Gel 15 Gram(s) Oral once  dextrose 50% Injectable 25 Gram(s) IV Push once  dextrose 50% Injectable 12.5 Gram(s) IV Push once  dextrose 50% Injectable 25 Gram(s) IV Push once  glucagon  Injectable 1 milliGRAM(s) IntraMuscular Once  insulin lispro (ADMELOG) corrective regimen sliding scale   SubCutaneous Before meals and at bedtime    Cardiac:    Other Medications:  acetaminophen   Tablet .. 1000 milliGRAM(s) Oral every 8 hours  BUpivacaine liposome 1.3% Injectable (no eMAR) 20 milliLiter(s) Local Injection Once  dextrose 5%. 1000 milliLiter(s) IV Continuous <Continuous>  dextrose 5%. 1000 milliLiter(s) IV Continuous <Continuous>  gabapentin 600 milliGRAM(s) Oral three times a day PRN  HYDROmorphone  Injectable 0.5 milliGRAM(s) IV Push every 4 hours PRN  lactated ringers. 1000 milliLiter(s) IV Continuous <Continuous>  methocarbamol 500 milliGRAM(s) Oral every 8 hours  naloxone Injectable 0.1 milliGRAM(s) IV Push every 3 minutes PRN  ondansetron   Disintegrating Tablet 4 milliGRAM(s) Oral every 6 hours PRN  oxyCODONE    IR 5 milliGRAM(s) Oral every 4 hours PRN  oxyCODONE    IR 10 milliGRAM(s) Oral every 4 hours PRN  prochlorperazine   Injectable 10 milliGRAM(s) IV Push Once PRN  prochlorperazine   Injectable 10 milliGRAM(s) IV Push every 8 hours PRN      Allergies    No Known Allergies    Intolerances        Review of Systems:   •	General: negative  •	Skin/Breast: negative  •	Ophthalmologic: negative  •	ENMT: negative  •	Respiratory and Thorax: negative  •	Cardiovascular: negative  •	Gastrointestinal: negative  •	Genitourinary: negative  •	Musculoskeletal: negative  •	Neurological: negative  •	Psychiatric: negative  •	Hematology/Lymphatics: negative  •	Endocrine: negative  •	Allergic/Immunologic: negative    Physical Exam:   • Constitutional:	Well-developed, well nourished  • Eyes:	EOMI; PERRL; no drainage or redness  • ENMT:	No oral lesions; no gross abnormalities  • Neck	No bruits; no thyromegaly or nodules  • Breasts:	not examined  • Back:	No deformity or limitation of movement  • Respiratory:	Breath Sounds equal & clear to percussion & auscultation, no accessory muscle use  • Cardiovascular:	Regular rate & rhythm, normal S1, S2; no murmurs, gallops or rubs; no S3, S4  • Gastrointestinal:	Soft, non-tender, no hepatosplenomegaly, normal bowel sounds  • Genitourinary:	not examined  • Rectal: not examined  • Extremities:	No cyanosis, clubbing or edema  • Vascular:	Equal and normal pulses (carotid, femoral, dorsalis pedis)  • Neurologica:l	not examined  • Skin:	No lesions; no rash  • Lymph Nodes:	No lymphadedenopathy  • Musculoskeletal:	No joint pain, swelling or deformity; no limitation of movement      Vital Signs Last 24 Hrs  T(C): 37.5 (02 Feb 2021 20:17), Max: 37.5 (02 Feb 2021 20:17)  T(F): 99.5 (02 Feb 2021 20:17), Max: 99.5 (02 Feb 2021 20:17)  HR: 90 (02 Feb 2021 20:17) (66 - 90)  BP: 115/55 (02 Feb 2021 20:17) (92/50 - 136/66)  BP(mean): --  RR: 16 (02 Feb 2021 20:17) (14 - 18)  SpO2: 94% (02 Feb 2021 20:17) (94% - 99%)    02-01 @ 07:01  -  02-02 @ 07:00  --------------------------------------------------------  IN: 1300 mL / OUT: 2950 mL / NET: -1650 mL    02-02 @ 07:01 - 02-02 @ 22:22  --------------------------------------------------------  IN: 480 mL / OUT: 1063 mL / NET: -583 mL          LABS:      CBC Full  -  ( 02 Feb 2021 07:18 )  WBC Count : 10.01 K/uL  RBC Count : 3.09 M/uL  Hemoglobin : 9.8 g/dL  Hematocrit : 29.4 %  Platelet Count - Automated : 217 K/uL  Mean Cell Volume : 95.1 fl  Mean Cell Hemoglobin : 31.7 pg  Mean Cell Hemoglobin Concentration : 33.3 gm/dL  Auto Neutrophil # : 7.48 K/uL  Auto Lymphocyte # : 1.69 K/uL  Auto Monocyte # : 0.77 K/uL  Auto Eosinophil # : 0.00 K/uL  Auto Basophil # : 0.02 K/uL  Auto Neutrophil % : 74.7 %  Auto Lymphocyte % : 16.9 %  Auto Monocyte % : 7.7 %  Auto Eosinophil % : 0.0 %  Auto Basophil % : 0.2 %    02-02    141  |  105  |  9   ----------------------------<  136<H>  4.0   |  25  |  0.69    Ca    8.5      02 Feb 2021 07:18                        RADIOLOGY & ADDITIONAL STUDIES (The following images were personally reviewed):

## 2021-02-02 NOTE — CONSULT NOTE ADULT - PROBLEM SELECTOR RECOMMENDATION 3
Continue insulin sliding scale. Maintain her blood sugar in the range between 140- 180, and not below 70. Monitor for hypoglycemia. Monitor blood sugar with advancing diet. Hold oral hypoglycemic agents during hospitalization. Adjust insulin..  The patient is on Metformin as an outpatient hold for now and start premeal

## 2021-02-02 NOTE — PHYSICAL THERAPY INITIAL EVALUATION ADULT - GENERAL OBSERVATIONS, REHAB EVAL
Patient received semi-simpson in bed in NAD on RA, +SCDs, +IV/PCA, +hemovac x2, +iglesias. Cleared by CELESTE Lamas. Agreeable to PT.

## 2021-02-02 NOTE — CONSULT NOTE ADULT - ASSESSMENT
Recommended Treatment PLAN:    -C/w Dilaudid PCA  -Increase rescue dose to Dilaudid 1.5 mg IV q4h PRN  -Plan d/w Dr. Soliman

## 2021-02-02 NOTE — DISCHARGE NOTE PROVIDER - HOSPITAL COURSE
Admitted 2/1  Surgery 2/1 Lami/PLIF L5-S1  Carito-op Antibiotics  Pain control  DVT prophylaxis  OOB/Physical Therapy Admitted 2/1/21  Surgery 2/1/21 Lami/TLIF/PSF L5-S1  Carito-op Antibiotics  Pain control  DVT prophylaxis  OOB/Physical Therapy

## 2021-02-03 ENCOUNTER — TRANSCRIPTION ENCOUNTER (OUTPATIENT)
Age: 71
End: 2021-02-03

## 2021-02-03 LAB
ANION GAP SERPL CALC-SCNC: 10 MMOL/L — SIGNIFICANT CHANGE UP (ref 5–17)
BASOPHILS # BLD AUTO: 0.03 K/UL — SIGNIFICANT CHANGE UP (ref 0–0.2)
BASOPHILS NFR BLD AUTO: 0.3 % — SIGNIFICANT CHANGE UP (ref 0–2)
BUN SERPL-MCNC: 10 MG/DL — SIGNIFICANT CHANGE UP (ref 7–23)
CALCIUM SERPL-MCNC: 8.6 MG/DL — SIGNIFICANT CHANGE UP (ref 8.4–10.5)
CHLORIDE SERPL-SCNC: 104 MMOL/L — SIGNIFICANT CHANGE UP (ref 96–108)
CO2 SERPL-SCNC: 28 MMOL/L — SIGNIFICANT CHANGE UP (ref 22–31)
CREAT SERPL-MCNC: 0.75 MG/DL — SIGNIFICANT CHANGE UP (ref 0.5–1.3)
EOSINOPHIL # BLD AUTO: 0.05 K/UL — SIGNIFICANT CHANGE UP (ref 0–0.5)
EOSINOPHIL NFR BLD AUTO: 0.6 % — SIGNIFICANT CHANGE UP (ref 0–6)
GLUCOSE BLDC GLUCOMTR-MCNC: 165 MG/DL — HIGH (ref 70–99)
GLUCOSE BLDC GLUCOMTR-MCNC: 189 MG/DL — HIGH (ref 70–99)
GLUCOSE BLDC GLUCOMTR-MCNC: 243 MG/DL — HIGH (ref 70–99)
GLUCOSE BLDC GLUCOMTR-MCNC: 278 MG/DL — HIGH (ref 70–99)
GLUCOSE SERPL-MCNC: 171 MG/DL — HIGH (ref 70–99)
HCT VFR BLD CALC: 32.3 % — LOW (ref 34.5–45)
HGB BLD-MCNC: 10.5 G/DL — LOW (ref 11.5–15.5)
IMM GRANULOCYTES NFR BLD AUTO: 0.4 % — SIGNIFICANT CHANGE UP (ref 0–1.5)
LYMPHOCYTES # BLD AUTO: 2.82 K/UL — SIGNIFICANT CHANGE UP (ref 1–3.3)
LYMPHOCYTES # BLD AUTO: 31.1 % — SIGNIFICANT CHANGE UP (ref 13–44)
MCHC RBC-ENTMCNC: 31.4 PG — SIGNIFICANT CHANGE UP (ref 27–34)
MCHC RBC-ENTMCNC: 32.5 GM/DL — SIGNIFICANT CHANGE UP (ref 32–36)
MCV RBC AUTO: 96.7 FL — SIGNIFICANT CHANGE UP (ref 80–100)
MONOCYTES # BLD AUTO: 0.7 K/UL — SIGNIFICANT CHANGE UP (ref 0–0.9)
MONOCYTES NFR BLD AUTO: 7.7 % — SIGNIFICANT CHANGE UP (ref 2–14)
NEUTROPHILS # BLD AUTO: 5.44 K/UL — SIGNIFICANT CHANGE UP (ref 1.8–7.4)
NEUTROPHILS NFR BLD AUTO: 59.9 % — SIGNIFICANT CHANGE UP (ref 43–77)
NRBC # BLD: 0 /100 WBCS — SIGNIFICANT CHANGE UP (ref 0–0)
PLATELET # BLD AUTO: 224 K/UL — SIGNIFICANT CHANGE UP (ref 150–400)
POTASSIUM SERPL-MCNC: 3.9 MMOL/L — SIGNIFICANT CHANGE UP (ref 3.5–5.3)
POTASSIUM SERPL-SCNC: 3.9 MMOL/L — SIGNIFICANT CHANGE UP (ref 3.5–5.3)
RBC # BLD: 3.34 M/UL — LOW (ref 3.8–5.2)
RBC # FLD: 13 % — SIGNIFICANT CHANGE UP (ref 10.3–14.5)
SODIUM SERPL-SCNC: 142 MMOL/L — SIGNIFICANT CHANGE UP (ref 135–145)
WBC # BLD: 9.08 K/UL — SIGNIFICANT CHANGE UP (ref 3.8–10.5)
WBC # FLD AUTO: 9.08 K/UL — SIGNIFICANT CHANGE UP (ref 3.8–10.5)

## 2021-02-03 PROCEDURE — 99232 SBSQ HOSP IP/OBS MODERATE 35: CPT | Mod: GC

## 2021-02-03 PROCEDURE — 72100 X-RAY EXAM L-S SPINE 2/3 VWS: CPT | Mod: 26

## 2021-02-03 RX ORDER — MORPHINE SULFATE 50 MG/1
15 CAPSULE, EXTENDED RELEASE ORAL EVERY 4 HOURS
Refills: 0 | Status: DISCONTINUED | OUTPATIENT
Start: 2021-02-03 | End: 2021-02-05

## 2021-02-03 RX ORDER — POLYETHYLENE GLYCOL 3350 17 G/17G
17 POWDER, FOR SOLUTION ORAL DAILY
Refills: 0 | Status: DISCONTINUED | OUTPATIENT
Start: 2021-02-03 | End: 2021-02-05

## 2021-02-03 RX ORDER — DIPHENHYDRAMINE HCL 50 MG
25 CAPSULE ORAL EVERY 4 HOURS
Refills: 0 | Status: DISCONTINUED | OUTPATIENT
Start: 2021-02-03 | End: 2021-02-05

## 2021-02-03 RX ORDER — SODIUM CHLORIDE 9 MG/ML
500 INJECTION INTRAMUSCULAR; INTRAVENOUS; SUBCUTANEOUS ONCE
Refills: 0 | Status: DISCONTINUED | OUTPATIENT
Start: 2021-02-03 | End: 2021-02-03

## 2021-02-03 RX ORDER — SENNA PLUS 8.6 MG/1
2 TABLET ORAL AT BEDTIME
Refills: 0 | Status: DISCONTINUED | OUTPATIENT
Start: 2021-02-03 | End: 2021-02-05

## 2021-02-03 RX ORDER — MORPHINE SULFATE 50 MG/1
7.5 CAPSULE, EXTENDED RELEASE ORAL EVERY 4 HOURS
Refills: 0 | Status: DISCONTINUED | OUTPATIENT
Start: 2021-02-03 | End: 2021-02-05

## 2021-02-03 RX ADMIN — ATORVASTATIN CALCIUM 40 MILLIGRAM(S): 80 TABLET, FILM COATED ORAL at 20:53

## 2021-02-03 RX ADMIN — METHOCARBAMOL 500 MILLIGRAM(S): 500 TABLET, FILM COATED ORAL at 20:53

## 2021-02-03 RX ADMIN — METHOCARBAMOL 500 MILLIGRAM(S): 500 TABLET, FILM COATED ORAL at 05:50

## 2021-02-03 RX ADMIN — HYDROMORPHONE HYDROCHLORIDE 0.5 MILLIGRAM(S): 2 INJECTION INTRAMUSCULAR; INTRAVENOUS; SUBCUTANEOUS at 01:03

## 2021-02-03 RX ADMIN — MORPHINE SULFATE 7.5 MILLIGRAM(S): 50 CAPSULE, EXTENDED RELEASE ORAL at 11:48

## 2021-02-03 RX ADMIN — Medication 4: at 11:47

## 2021-02-03 RX ADMIN — Medication 2: at 07:22

## 2021-02-03 RX ADMIN — PANTOPRAZOLE SODIUM 40 MILLIGRAM(S): 20 TABLET, DELAYED RELEASE ORAL at 05:50

## 2021-02-03 RX ADMIN — MORPHINE SULFATE 15 MILLIGRAM(S): 50 CAPSULE, EXTENDED RELEASE ORAL at 15:50

## 2021-02-03 RX ADMIN — METHOCARBAMOL 500 MILLIGRAM(S): 500 TABLET, FILM COATED ORAL at 14:37

## 2021-02-03 RX ADMIN — SENNA PLUS 2 TABLET(S): 8.6 TABLET ORAL at 20:53

## 2021-02-03 RX ADMIN — OXYCODONE HYDROCHLORIDE 10 MILLIGRAM(S): 5 TABLET ORAL at 05:54

## 2021-02-03 RX ADMIN — Medication 6: at 22:38

## 2021-02-03 RX ADMIN — Medication 2: at 17:15

## 2021-02-03 RX ADMIN — Medication 1000 MILLIGRAM(S): at 20:53

## 2021-02-03 RX ADMIN — Medication 1000 MILLIGRAM(S): at 05:50

## 2021-02-03 RX ADMIN — POLYETHYLENE GLYCOL 3350 17 GRAM(S): 17 POWDER, FOR SOLUTION ORAL at 11:47

## 2021-02-03 RX ADMIN — Medication 1000 MILLIGRAM(S): at 14:37

## 2021-02-03 RX ADMIN — MORPHINE SULFATE 15 MILLIGRAM(S): 50 CAPSULE, EXTENDED RELEASE ORAL at 20:52

## 2021-02-03 NOTE — PROGRESS NOTE ADULT - SUBJECTIVE AND OBJECTIVE BOX
Interval Events: Reviewed  Patient seen and examined at bedside.    Patient is a 70y old  Female who presents with a chief complaint of Back pain (02 Feb 2021 11:20)    she is doing well  PAST MEDICAL & SURGICAL HISTORY:  Colon polyp    HLD (hyperlipidemia)    Diabetes    HTN (hypertension)    History of back surgery  2011, lumbar    History of appendectomy  2012    H/O abdominal hysterectomy  30y ago        MEDICATIONS:  Pulmonary:    Antimicrobials:    Anticoagulants:    Cardiac:      Allergies    Dilaudid (Pruritus)  oxycodone (Pruritus)    Intolerances        Vital Signs Last 24 Hrs  T(C): 38.1 (03 Feb 2021 20:56), Max: 38.1 (03 Feb 2021 20:56)  T(F): 100.6 (03 Feb 2021 20:56), Max: 100.6 (03 Feb 2021 20:56)  HR: 79 (03 Feb 2021 20:56) (74 - 89)  BP: 137/72 (03 Feb 2021 20:56) (108/63 - 151/67)  BP(mean): 94 (03 Feb 2021 20:56) (94 - 94)  RR: 16 (03 Feb 2021 20:56) (14 - 18)  SpO2: 94% (03 Feb 2021 20:56) (94% - 97%)    02-02 @ 07:01 - 02-03 @ 07:00  --------------------------------------------------------  IN: 480 mL / OUT: 4243 mL / NET: -3763 mL    02-03 @ 07:01  -  02-03 @ 21:51  --------------------------------------------------------  IN: 240 mL / OUT: 1450 mL / NET: -1210 mL          Review of Systems:   •	General: negative  •	Skin/Breast: negative  •	Ophthalmologic: negative  •	ENMT: negative  •	Respiratory and Thorax: negative  •	Cardiovascular: negative  •	Gastrointestinal: negative  •	Genitourinary: negative  •	Musculoskeletal: negative  •	Neurological: negative  •	Psychiatric: negative  •	Hematology/Lymphatics: negative  •	Endocrine: negative  •	Allergic/Immunologic: negative    Physical Exam:   • Constitutional:	Well-developed, well nourished  • Eyes:	EOMI; PERRL; no drainage or redness  • ENMT:	No oral lesions; no gross abnormalities  • Neck	No bruits; no thyromegaly or nodules  • Breasts:	not examined  • Back:	No deformity or limitation of movement  • Respiratory:	Breath Sounds equal & clear to percussion & auscultation, no accessory muscle use  • Cardiovascular:	Regular rate & rhythm, normal S1, S2; no murmurs, gallops or rubs; no S3, S4  • Gastrointestinal:	Soft, non-tender, no hepatosplenomegaly, normal bowel sounds  • Genitourinary:	not examined  • Rectal: not examined  • Extremities:	No cyanosis, clubbing or edema  • Vascular:	Equal and normal pulses (carotid, femoral, dorsalis pedis)  • Neurologica:l	not examined  • Skin:	No lesions; no rash  • Lymph Nodes:	No lymphadedenopathy  • Musculoskeletal:	No joint pain, swelling or deformity; no limitation of movement        LABS:      CBC Full  -  ( 03 Feb 2021 06:56 )  WBC Count : 9.08 K/uL  RBC Count : 3.34 M/uL  Hemoglobin : 10.5 g/dL  Hematocrit : 32.3 %  Platelet Count - Automated : 224 K/uL  Mean Cell Volume : 96.7 fl  Mean Cell Hemoglobin : 31.4 pg  Mean Cell Hemoglobin Concentration : 32.5 gm/dL  Auto Neutrophil # : 5.44 K/uL  Auto Lymphocyte # : 2.82 K/uL  Auto Monocyte # : 0.70 K/uL  Auto Eosinophil # : 0.05 K/uL  Auto Basophil # : 0.03 K/uL  Auto Neutrophil % : 59.9 %  Auto Lymphocyte % : 31.1 %  Auto Monocyte % : 7.7 %  Auto Eosinophil % : 0.6 %  Auto Basophil % : 0.3 %    02-03    142  |  104  |  10  ----------------------------<  171<H>  3.9   |  28  |  0.75    Ca    8.6      03 Feb 2021 06:56                          RADIOLOGY & ADDITIONAL STUDIES (The following images were personally reviewed):  Fisher:                                     No  Urine output:                       adequate  DVT prophylaxis:                 Yes  Flattus:                                  Yes  Bowel movement:              No

## 2021-02-03 NOTE — PROGRESS NOTE ADULT - SUBJECTIVE AND OBJECTIVE BOX
Ortho Note    Pt comfortable without complaints, pain controlled  Denies CP, SOB, N/V, numbness/tingling     Vital Signs Last 24 Hrs  T(C): 36.8 (02-03-21 @ 05:30), Max: 36.8 (02-03-21 @ 05:30)  T(F): 98.2 (02-03-21 @ 05:30), Max: 98.2 (02-03-21 @ 05:30)  HR: 74 (02-03-21 @ 05:30) (74 - 74)  BP: 108/63 (02-03-21 @ 05:30) (108/63 - 108/63)  BP(mean): --  RR: 18 (02-03-21 @ 05:30) (18 - 18)  SpO2: 96% (02-03-21 @ 05:30) (96% - 96%)  AVSS    General: Pt Alert and oriented, NAD  DSG: dry dressing C/D/I. Hv x 2 to suction  Pulses: bilateral pedal 2+< wwp toes, cap refill <3sec toes  Sensation: bilateral SILT  Motor: bilateral 5/5 EHL/FHL/TA/GS        A/P: 70yFemale POD#2 s/p L5-S1 lami/TLIF/PSF  - Stable, afebrile, nontoxic appearance  -Fisher removed f/up TOV  - Pain Control: patient may be allergic to Oxycodone reports itchiness, d/c Oxycodone, trying Morphine 7.5 mg q 4hrs for moderate pain and 15 mg q4hrs for severe pain per PM   - DVT ppx: SCDS  - PT, WBS: WBAT spinal precautions  -Bowel regimen: continue bowel regimen  -Dispo: home pending PT clearance and drain outputs    Ortho Pager [phone]

## 2021-02-03 NOTE — PROGRESS NOTE ADULT - SUBJECTIVE AND OBJECTIVE BOX
Ortho Note    Procedure: L5-S1 lami/PLIF  Surgeon: Dr. Evangelista     pt seen and examined at bedside   no issues overnight  increased pain this AM due to being moved for changing bedding   Denies CP, SOB, N/V, numbness/tingling     Vital Signs Last 24 Hrs  T(C): 36.4 (02-01-21 @ 11:28), Max: 36.4 (02-01-21 @ 11:28)  T(F): 97.5 (02-01-21 @ 11:28), Max: 97.5 (02-01-21 @ 11:28)  HR: 68 (02-01-21 @ 13:28) (67 - 73)  BP: 108/55 (02-01-21 @ 13:28) (82/47 - 111/57)  BP(mean): 74 (02-01-21 @ 13:28) (60 - 80)  RR: 13 (02-01-21 @ 13:28) (13 - 16)  SpO2: 99% (02-01-21 @ 13:28) (95% - 99%)      General: Pt Alert and oriented, NAD  Dressing C/D/I: lumbar dressing in tact- HV x2   Fisher in place   Pulses: 2+ DP pulses in tact   Sensation: intact to light touch   Motor: EHL/FHL/TA/GS 5/5 bilateral LE         A/P: 70yFemale POD#2 s/p L5-S1 lami/PLIF     - Stable in PACU   - c/w IV and PO Pain Control  - DVT ppx: SCDs  - PT, WBS: WBAT   -monitor drain outputs     Ortho Pager [phone] Ortho Note    Procedure: L5-S1 lami/PLIF  Surgeon: Dr. Evangelista     pt seen and examined at bedside   no issues overnight  increased pain this AM due to being moved for changing bedding   Denies CP, SOB, N/V, numbness/tingling     Vital Signs Last 24 Hrs  T(C): 36.4 (02-01-21 @ 11:28), Max: 36.4 (02-01-21 @ 11:28)  T(F): 97.5 (02-01-21 @ 11:28), Max: 97.5 (02-01-21 @ 11:28)  HR: 68 (02-01-21 @ 13:28) (67 - 73)  BP: 108/55 (02-01-21 @ 13:28) (82/47 - 111/57)  BP(mean): 74 (02-01-21 @ 13:28) (60 - 80)  RR: 13 (02-01-21 @ 13:28) (13 - 16)  SpO2: 99% (02-01-21 @ 13:28) (95% - 99%)      General: Pt Alert and oriented, NAD  Dressing C/D/I: lumbar dressing in tact- HV x2   Fisher in place   Pulses: 2+ DP pulses in tact   Sensation: intact to light touch   Motor: EHL/FHL/TA/GS 5/5 bilateral LE         A/P: 70yFemale POD#2 s/p L5-S1 lami/PLIF     - Stable   - c/w IV and PO Pain Control  - DVT ppx: SCDs  - PT, WBS: WBAT   -monitor drain outputs     Ortho Pager [phone]

## 2021-02-03 NOTE — PROGRESS NOTE ADULT - ASSESSMENT
Recommended Treatment PLAN:    -Consider weaning Dilaudid IV  -Recommend Oxycodone IR 5 mg PO q4h PRN severe pain   -Plan d/w Dr. Soliman

## 2021-02-03 NOTE — PROGRESS NOTE ADULT - SUBJECTIVE AND OBJECTIVE BOX
Pain Management Progress Note - [facility] [phone]    HPI:  70F with low back pain x several months presented for surgery. Pt notes she has pain down bilateral lower extremities, right greater than left. She ambulates with no assistance at baseline. She has tried PT and injections that do not improve her pain. Pt endorses intermittent numbness and tingling to her legs that comes and goes. She presented for L5-S1 Lami.     Pain is ___ sharp ____dull ___burning _x__achy ___ Intensity: ____ mild ___mod ___severe     Location ____surgical site ____cervical ___x__lumbar ____abd ____upper ext____lower ext    Worse with ____activity __x__movement _____physical therapy___ Rest    Improved with _x___medication ____rest ____physical therapy    ROS: Const:  ___febrile   Eyes:___ENT:___CV: ___chest pain  Resp: ____sob  GI:___nausea ___vomiting ___abd pain ___npo ___clears __full diet __bm  :___ [place]: _x__pain ___spasm  Skin:___ Neuro:  ___sedation___confusion___ numbness ___weakness ___paresth  Psych:__anxiety  Endo:___ Heme:___Allergy:_________, ___all others reviewed and negative    sodium chloride 0.9% Bolus  polyethylene glycol 3350  senna  diphenhydrAMINE  diphenhydrAMINE  HYDROmorphone  Injectable  oxyCODONE    IR  oxyCODONE    IR  atorvastatin  prochlorperazine   Injectable  acetaminophen   Tablet ..  HYDROmorphone  Injectable  ondansetron Injectable  naloxone Injectable  HYDROmorphone PCA (1 mG/mL)  dextrose 50% Injectable  dextrose 50% Injectable  dextrose 50% Injectable  dextrose 40% Gel  insulin lispro (ADMELOG) corrective regimen sliding scale  ceFAZolin   IVPB  lactated ringers Bolus  pantoprazole    Tablet  atorvastatin Oral Tab/Cap - Peds  gabapentin  (Floorstock)  (Floorstock)  (Floorstock)  (Floorstock)  (Floorstock)  (Floorstock)  (Floorstock)  BUpivacaine liposome 1.3% Injectable (no eMAR)  (Floorstock)  (Floorstock)  glucagon  Injectable  dextrose 5%.  dextrose 50% Injectable  dextrose 50% Injectable  dextrose 50% Injectable  dextrose 5%.  dextrose 40% Gel  insulin lispro (ADMELOG) corrective regimen sliding scale  insulin lispro (ADMELOG) corrective regimen sliding scale  HYDROmorphone  Injectable  oxyCODONE    IR  methocarbamol  ondansetron   Disintegrating Tablet  prochlorperazine   Injectable  ceFAZolin   IVPB  HYDROmorphone  Injectable  acetaminophen   Tablet ..  lactated ringers.  (Floorstock)  (Floorstock)  (Floorstock)  (Floorstock)  (Floorstock)    02-03 @ 06:5680 mL/min/1.73M2    Hemoglobin: 10.5 g/dL (02-03 @ 06:56)  Hemoglobin: 9.8 g/dL (02-02 @ 07:18)    T(C): 36.8 (02-03-21 @ 05:30), Max: 37.5 (02-02-21 @ 20:17)  HR: 74 (02-03-21 @ 05:30) (74 - 90)  BP: 108/63 (02-03-21 @ 05:30) (92/50 - 136/66)  RR: 18 (02-03-21 @ 05:30) (14 - 18)  SpO2: 96% (02-03-21 @ 05:30) (94% - 99%)  Wt(kg): --    PHYSICAL EXAM:  Gen Appearance: _x__no acute distress __x_appropriate        Neuro: _x__SILT feet__x__ EOM Intact Psych: AAOX_3_, _x__mood/affect appropriate        Eyes: __x_conjunctiva WNL  ___x__ Pupils equal and round        ENT: _x_ears and nose atraumatic_x__ Hearing grossly intact        Neck: _x__trachea midline, no visible masses __x_thyroid without palpable mass    Resp: __x_Nml WOB___x_No tactile fremitus _x__clear to auscultation    Cardio: ___extremities free from edema __x__pedal pulses palpable    GI/Abdomen: _x__soft ___x__ Nontender____x__Nondistended_____HSM    Lymphatic: _x__no palpable nodes in neck  __x_no palpable nodes calves and feet    Skin/Wound: _x__Incision, __x_Dressing c/d/i,   ____surrounding tissues soft,  ___drain/chest tube present____    Muscular: EHL _5__/5  Gastrocnemius_5__/5    __x_absent clubbing/cyanosis        ASSESSMENT:  This is a 70y old Female with a history of:  M48.06    Handoff    MEWS Score    Colon polyp    HLD (hyperlipidemia)    Diabetes    HTN (hypertension)    TLIF, 1 level    Spinal stenosis of lumbar region, unspecified whether neurogenic claudication present    Postoperative state    Anemia due to acute blood loss    Type 2 diabetes mellitus without complication, without long-term current use of insulin    Pure hypercholesterolemia    Essential hypertension    HTN (hypertension)    HLD (hyperlipidemia)    Diabetes    Colon polyp    Spinal stenosis of lumbar region, unspecified whether neurogenic claudication present    TLIF, 1 level    History of back surgery    History of appendectomy    H/O abdominal hysterectomy    Sarah_VstLnk

## 2021-02-03 NOTE — DISCHARGE NOTE NURSING/CASE MANAGEMENT/SOCIAL WORK - PATIENT PORTAL LINK FT
You can access the FollowMyHealth Patient Portal offered by [facility] by registering at the following website: [email or web address]. By joining [facility] [employer]’s FollowMyHealth portal, you will also be able to view your health information using other applications (apps) compatible with our system.

## 2021-02-04 LAB
ANION GAP SERPL CALC-SCNC: 11 MMOL/L — SIGNIFICANT CHANGE UP (ref 5–17)
BASOPHILS # BLD AUTO: 0.03 K/UL — SIGNIFICANT CHANGE UP (ref 0–0.2)
BASOPHILS NFR BLD AUTO: 0.4 % — SIGNIFICANT CHANGE UP (ref 0–2)
BUN SERPL-MCNC: 11 MG/DL — SIGNIFICANT CHANGE UP (ref 7–23)
CALCIUM SERPL-MCNC: 8.9 MG/DL — SIGNIFICANT CHANGE UP (ref 8.4–10.5)
CHLORIDE SERPL-SCNC: 102 MMOL/L — SIGNIFICANT CHANGE UP (ref 96–108)
CO2 SERPL-SCNC: 25 MMOL/L — SIGNIFICANT CHANGE UP (ref 22–31)
CREAT SERPL-MCNC: 0.59 MG/DL — SIGNIFICANT CHANGE UP (ref 0.5–1.3)
EOSINOPHIL # BLD AUTO: 0.06 K/UL — SIGNIFICANT CHANGE UP (ref 0–0.5)
EOSINOPHIL NFR BLD AUTO: 0.7 % — SIGNIFICANT CHANGE UP (ref 0–6)
GLUCOSE BLDC GLUCOMTR-MCNC: 190 MG/DL — HIGH (ref 70–99)
GLUCOSE BLDC GLUCOMTR-MCNC: 285 MG/DL — HIGH (ref 70–99)
GLUCOSE BLDC GLUCOMTR-MCNC: 290 MG/DL — HIGH (ref 70–99)
GLUCOSE BLDC GLUCOMTR-MCNC: 302 MG/DL — HIGH (ref 70–99)
GLUCOSE SERPL-MCNC: 211 MG/DL — HIGH (ref 70–99)
HCT VFR BLD CALC: 32.6 % — LOW (ref 34.5–45)
HGB BLD-MCNC: 10.8 G/DL — LOW (ref 11.5–15.5)
IMM GRANULOCYTES NFR BLD AUTO: 0.2 % — SIGNIFICANT CHANGE UP (ref 0–1.5)
LYMPHOCYTES # BLD AUTO: 1.47 K/UL — SIGNIFICANT CHANGE UP (ref 1–3.3)
LYMPHOCYTES # BLD AUTO: 18.1 % — SIGNIFICANT CHANGE UP (ref 13–44)
MCHC RBC-ENTMCNC: 32 PG — SIGNIFICANT CHANGE UP (ref 27–34)
MCHC RBC-ENTMCNC: 33.1 GM/DL — SIGNIFICANT CHANGE UP (ref 32–36)
MCV RBC AUTO: 96.7 FL — SIGNIFICANT CHANGE UP (ref 80–100)
MONOCYTES # BLD AUTO: 0.58 K/UL — SIGNIFICANT CHANGE UP (ref 0–0.9)
MONOCYTES NFR BLD AUTO: 7.2 % — SIGNIFICANT CHANGE UP (ref 2–14)
NEUTROPHILS # BLD AUTO: 5.95 K/UL — SIGNIFICANT CHANGE UP (ref 1.8–7.4)
NEUTROPHILS NFR BLD AUTO: 73.4 % — SIGNIFICANT CHANGE UP (ref 43–77)
NRBC # BLD: 0 /100 WBCS — SIGNIFICANT CHANGE UP (ref 0–0)
PLATELET # BLD AUTO: 221 K/UL — SIGNIFICANT CHANGE UP (ref 150–400)
POTASSIUM SERPL-MCNC: 3.9 MMOL/L — SIGNIFICANT CHANGE UP (ref 3.5–5.3)
POTASSIUM SERPL-SCNC: 3.9 MMOL/L — SIGNIFICANT CHANGE UP (ref 3.5–5.3)
RBC # BLD: 3.37 M/UL — LOW (ref 3.8–5.2)
RBC # FLD: 12.8 % — SIGNIFICANT CHANGE UP (ref 10.3–14.5)
SODIUM SERPL-SCNC: 138 MMOL/L — SIGNIFICANT CHANGE UP (ref 135–145)
WBC # BLD: 8.11 K/UL — SIGNIFICANT CHANGE UP (ref 3.8–10.5)
WBC # FLD AUTO: 8.11 K/UL — SIGNIFICANT CHANGE UP (ref 3.8–10.5)

## 2021-02-04 PROCEDURE — 99232 SBSQ HOSP IP/OBS MODERATE 35: CPT | Mod: GC

## 2021-02-04 RX ORDER — INSULIN LISPRO 100/ML
4 VIAL (ML) SUBCUTANEOUS
Refills: 0 | Status: DISCONTINUED | OUTPATIENT
Start: 2021-02-04 | End: 2021-02-05

## 2021-02-04 RX ADMIN — MORPHINE SULFATE 15 MILLIGRAM(S): 50 CAPSULE, EXTENDED RELEASE ORAL at 17:22

## 2021-02-04 RX ADMIN — METHOCARBAMOL 500 MILLIGRAM(S): 500 TABLET, FILM COATED ORAL at 21:51

## 2021-02-04 RX ADMIN — ATORVASTATIN CALCIUM 40 MILLIGRAM(S): 80 TABLET, FILM COATED ORAL at 21:52

## 2021-02-04 RX ADMIN — MORPHINE SULFATE 15 MILLIGRAM(S): 50 CAPSULE, EXTENDED RELEASE ORAL at 06:59

## 2021-02-04 RX ADMIN — Medication 1000 MILLIGRAM(S): at 21:52

## 2021-02-04 RX ADMIN — Medication 8: at 21:56

## 2021-02-04 RX ADMIN — METHOCARBAMOL 500 MILLIGRAM(S): 500 TABLET, FILM COATED ORAL at 14:31

## 2021-02-04 RX ADMIN — MORPHINE SULFATE 15 MILLIGRAM(S): 50 CAPSULE, EXTENDED RELEASE ORAL at 11:00

## 2021-02-04 RX ADMIN — POLYETHYLENE GLYCOL 3350 17 GRAM(S): 17 POWDER, FOR SOLUTION ORAL at 11:00

## 2021-02-04 RX ADMIN — PANTOPRAZOLE SODIUM 40 MILLIGRAM(S): 20 TABLET, DELAYED RELEASE ORAL at 05:33

## 2021-02-04 RX ADMIN — SENNA PLUS 2 TABLET(S): 8.6 TABLET ORAL at 21:51

## 2021-02-04 RX ADMIN — Medication 6: at 17:22

## 2021-02-04 RX ADMIN — Medication 1000 MILLIGRAM(S): at 05:32

## 2021-02-04 RX ADMIN — ONDANSETRON 4 MILLIGRAM(S): 8 TABLET, FILM COATED ORAL at 05:33

## 2021-02-04 RX ADMIN — METHOCARBAMOL 500 MILLIGRAM(S): 500 TABLET, FILM COATED ORAL at 05:33

## 2021-02-04 RX ADMIN — MORPHINE SULFATE 15 MILLIGRAM(S): 50 CAPSULE, EXTENDED RELEASE ORAL at 01:20

## 2021-02-04 RX ADMIN — Medication 2: at 06:58

## 2021-02-04 RX ADMIN — Medication 6: at 12:35

## 2021-02-04 RX ADMIN — Medication 1000 MILLIGRAM(S): at 14:31

## 2021-02-04 NOTE — PROGRESS NOTE ADULT - PROBLEM SELECTOR PLAN 3
Continue insulin sliding scale. Maintain her blood sugar in the range between 140- 180, and not below 70. Monitor for hypoglycemia. Monitor blood sugar with advancing diet. Hold oral hypoglycemic agents during hospitalization. Adjust insulin.
Continue insulin sliding scale. Maintain her blood sugar in the range between 140- 180, and not below 70. Monitor for hypoglycemia. Monitor blood sugar with advancing diet. Hold oral hypoglycemic agents during hospitalization. Adjust insulin.  BS is uncontrolled and started pre-meal insulin

## 2021-02-04 NOTE — DIETITIAN INITIAL EVALUATION ADULT. - PROBLEM SELECTOR PLAN 1
Admit to Orthopaedic Service.  Presents today for elective L5-S1 PSF Lami   Pt medically stable and cleared for procedure today by Dr. Carney

## 2021-02-04 NOTE — DIETITIAN INITIAL EVALUATION ADULT. - ENTER FROM (G/KG)
Discharge Summary         PATIENT ID: Sosa Yap  MRN: [id number]   YOB: 1928    DATE OF ADMISSION: 11/30/2017  7:27 AM    DATE OF DISCHARGE: 12/4/2017   PRIMARY CARE PROVIDER: Deshawn Domingo MD      ATTENDING PHYSICIAN: Dr. Tamica Flores  DISCHARGING PROVIDER: Timmy Miller NP    To contact this individual call [phone] and ask the  to page. If unavailable ask to be transferred the Adult Hospitalist Department.     CONSULTATIONS: ED CONSULT TO SENIOR SERVICES CASE MANAGEMENT  ED CONSULT TO Laz Manning  ED CONSULT TO SENIOR SERVICES PHARMACIST  IP CONSULT TO HOSPITALIST     PROCEDURES/SURGERIES: * No surgery found *     ADMITTING [address] [place] [address] COURSE:   Pt presented to the ED with fatigue and back pain. On initial assessment he was lethargic and didnt provide much history. Per his wife, he has chronic low back pain, but for the past 3 days he has been complaining of it more. She also noted that he had become increasingly fatigued and lethargic, and not eating as much. He is ambulatory with a walker at baseline but has been in a chair for most of the past 2 days. No documented fever, no known dysuria or hematuria.     12/4/2017  VSS, pt offers no complaints, very [place]. Wife at bedside this am.  Gricelda Small completed with wife. He has received 5 days of ceftriaxone, should complete 2 more days of abx w/ omnicef. Pt has baseline memory impairment/confusion at baseline per wife.      DISCHARGE DIAGNOSES / PLAN:       UTI (POA):  - ceftriaxone x 5 days, finish with 2 more days of omnicef  - e coli, pan-sensitive  - reportedly saw his urologist 2 weeks ago and was voiding normally      Lethargy and Acute Encephalopthy (POA): improved  - likely mild metabolic encephalopathy due to UTI   - now answers most of the questions. Wife reports he has troubles with current events and time at home.  Some confusion      Back pain: not significant factor this am  - chronic, due to DJD, possibly worsened by UTI  - acetaminophen PRN, capsaicin cream  - avoid opiates       Hypothyroidism: continue levothyroxine      Hyperlipidemia: continue atorvastatin      Hx CAD:  - s/p stenting, ASD repair, pacemaker placement, chronic atrial fib. - On  ASA, sotalol, Eliquis. - Consider stopping 1 blood thinner in an elderly patient (Probably just keep on Eliquis if ok with Cardiology). Can discuss this at next cardiology appt.      History of TIA: on asa and statin      History of prostate CA: s/p TURP      Code status: DDNR  DVT prophylaxis: [facility] discussed with: patient, attending MD  Disposition: acceptance to [address] [place] [address]         PENDING TEST RESULTS:   At the time of discharge the following test results are still pending: none     FOLLOW UP APPOINTMENTS:    Follow-up Information     Follow up With Details Comments Contact Info     Samia Webber MD   follow up post hospitalization [address]  [phone]            ADDITIONAL CARE RECOMMENDATIONS: as above     DIET:  cardiac     ACTIVITY: per rehab     WOUND CARE: none     EQUIPMENT needed: none        DISCHARGE MEDICATIONS:      Current Discharge Medication List           START taking these medications     Details   cefdinir (OMNICEF) 300 mg capsule Take 1 Cap by mouth two (2) times a day for 2 days. Qty: 4 Cap, Refills: 0               CONTINUE these medications which have NOT CHANGED     Details   aspirin delayed-release 81 mg tablet Take 81 mg by mouth daily.       !! levothyroxine (SYNTHROID) 50 mcg tablet Take 50 mcg by mouth five (5) days a week. (100 mcg on Mon+Fri; 50 mcg x 5 days weekly)       !! levothyroxine (SYNTHROID) 50 mcg tablet Take 100 mcg by mouth every Monday and Friday. (100 mcg on Mon+Fri; 50 mcg x 5 days weekly)       simvastatin (ZOCOR) 10 mg tablet Take 10 mg by mouth nightly.       memantine (NAMENDA) 10 mg tablet Take 10 mg by mouth two (2) times a day.     sotalol (SOTALOL AF) 80 mg tablet Take 1 Tab by mouth two (2) times a day. Qty: 60 Tab, Refills: 6       apixaban (ELIQUIS) 5 mg tablet Take 5 mg by mouth every twelve (12) hours.       donepezil (ARICEPT) 10 mg tablet Take 10 mg by mouth daily (with breakfast).     tolterodine (DETROL) 2 mg tablet Take 2 mg by mouth nightly. Indications: URINARY URGE INCONTINENCE        !! - Potential duplicate medications found. Please discuss with provider.                NOTIFY YOUR PHYSICIAN FOR ANY OF THE FOLLOWING:   Fever over 101 degrees for 24 hours. Chest pain, shortness of breath, fever, chills, nausea, vomiting, diarrhea, change in mentation, falling, weakness, bleeding. Severe pain or pain not relieved by medications. Or, any other signs or symptoms that you may have questions about.     DISPOSITION:     Home With:    OT   PT   HH   RN      xx Long term SNF/Inpatient Rehab     Independent/assisted living     Hospice     Other:         PATIENT CONDITION AT DISCHARGE:      Functional status     Poor    xx Deconditioned      Independent       Cognition      Lucid    xx Forgetful    xx Dementia       Catheters/lines (plus indication)     Skinner      PICC      PEG    xx None       Code status      Full code    xx DNR       PHYSICAL EXAMINATION AT DISCHARGE:  Constitutional:  No acute distress, cooperative, pleasant    ENT:  Oral mucous membranes moist. Has large birthmark to R temporal areas that extends to neck. Very [place], left hearing aide   Resp:  CTA bilaterally. No wheezing. No accessory muscle use, on RA   CV:  Regular rhythm, normal rate, no murmurs    GI:  Soft, non distended, non tender. normoactive bowel sounds +BM    Musculoskeletal:  No edema, warm, 2+ pulses throughout    Neurologic:  Moves all extremities.   AAOx2                        Psych:  Poor insight, Not anxious nor agitated.             CHRONIC MEDICAL DIAGNOSES:  Problem List as of 12/4/2017  Date Reviewed: 12/4/2017             Codes Class Noted - Resolved     * (Principal)UTI (urinary tract infection) ICD-10-CM: N39.0  ICD-9-CM: 599.0   2017 - Present           Altered mental status ICD-10-CM: R41.82  ICD-9-CM: 780.97   2015 - Present           Hemorrhage of right kidney ICD-10-CM: N28.89  ICD-9-CM: 593.81   2015 - Present           Vertigo ICD-10-CM: R42  ICD-9-CM: 780. 4   2013 - Present           Melanoma in situ of back ([facility] ICD-10-CM: D03.59  ICD-9-CM: 172. 5   3/4/2013 - Present           Melanoma of shoulder ([facility] ICD-10-CM: C43.60  ICD-9-CM: 172. 6   3/4/2013 - Present           Sialoadenitis ICD-10-CM: K11.20  ICD-9-CM: 527.2   2012 - Present           Exercise habits ICD-9-CM: [id number]   Unknown - Present     Overview Signed 2012  8:31 AM by Akira Ojeda DO       runs in park daily               Other ill-defined conditions(799.89) ICD-10-CM: R69  ICD-9-CM: 799.89   Unknown - Present     Overview Signed 2012  1:44 PM by Akira Ojeda DO       -WIFE WAS CALLED TO CONFIRM HISTORY               Peripheral neuropathy ICD-10-CM: G62.9  ICD-9-CM: 301. 9   2010 - Present           Recurrent herpes simplex ICD-10-CM: B00.9  ICD-9-CM: 858. 9   2010 - Present     Overview Signed 2010  4:38 PM by Dareen Apgar [facility]       Chest/scalp               Prostate CA [facility]) ICD-10-CM: C61  ICD-9-CM: 185   2010 - Present     Overview Signed 2010  4:38 PM by David Vilchis       radiation               Personal history of kidney stones ICD-10-CM: Z87.442  ICD-9-CM: V13.01   2010 - Present           History of melanoma ICD-10-CM: Z85.820  ICD-9-CM: V10.82   2010 - Present           Hypercholesteremia ICD-10-CM: E78.00  ICD-9-CM: 272.0   1992 - Present     Overview Signed 2010  4:39 PM by Dareen Apgar Cavcic       mild               RESOLVED: Hypoxia ICD-10-CM: R09.02  ICD-9-CM: 799.02   5/3/2012 - 2012           RESOLVED: Right leg weakness ICD-10-CM: V98.553  ICD-9-CM: 729.89   5/3/2012 - 2012           RESOLVED: Seizure disorder ([facility] ICD-10-CM: G40.909  ICD-9-CM: 345.90   4/21/2010 - 1/19/2011     Overview Signed 4/21/2010  4:39 PM by Soniya Asencio       2 degree to head injury in 1943               RESOLVED: Infarction of lymph node/vessel ICD-10-CM: I89.8  ICD-9-CM: 979. 8   4/21/2010 - 1/19/2011     Overview Signed 4/21/2010  4:41 PM by Soniya Asencio       Small vessel infarct R mid-brain                      Greater than 30 minutes were spent with the patient on counseling and coordination of care     Signed:   Kaelyn Chaidez NP  12/4/2017  11:05 AM                     1.2

## 2021-02-04 NOTE — DIETITIAN INITIAL EVALUATION ADULT. - PERSON TAUGHT/METHOD
encouraged adequate lean protein for healing post-op, also educated pt on diabetic diet- pt appeared receptive/verbal instruction/patient instructed

## 2021-02-04 NOTE — PROGRESS NOTE ADULT - SUBJECTIVE AND OBJECTIVE BOX
Ortho Note    Pt comfortable without complaints, pain controlled  Denies CP, SOB, N/V, numbness/tingling     Vital Signs Last 24 Hrs  T(C): 36.9 (02-04-21 @ 08:42), Max: 36.9 (02-04-21 @ 08:42)  T(F): 98.4 (02-04-21 @ 08:42), Max: 98.4 (02-04-21 @ 08:42)  HR: 88 (02-04-21 @ 08:42) (82 - 88)  BP: 106/64 (02-04-21 @ 08:42) (106/64 - 121/55)  BP(mean): 77 (02-04-21 @ 05:30) (77 - 77)  RR: 18 (02-04-21 @ 08:42) (18 - 18)  SpO2: 98% (02-04-21 @ 08:42) (95% - 98%)  AVSS    General: Pt Alert and oriented, NAD  DSG: dry dressing C/D/I, HV x2 to suction (A 12cc/32cc and B 45/75cc)  Pulses: bilateral pedal 2+< wwp toes, cap refill < 3sec toes  Sensation: bilateral SILT  Motor: bilateral 5/5 EHL/FHL/TA/GS        A/P: 70y Female POD#3 s/p lami/PSF/TLIF  - Stable, afebrile, nontoxic appearance, IS encouraged  - Monitor drains today  - Pain Control: po meds: Morphine 7.5 mg q 4hrs for moderate pain, Morphine 15mg q 4hrs for severe pain.  - DVT ppx: SCDs  - PT, WBS: WBAT spinal precautions  -Bowel regimen: continue bowel regimen  -Dispo: home tomorrow pending PT clearance    Ortho Pager [phone]

## 2021-02-04 NOTE — DIETITIAN INITIAL EVALUATION ADULT. - OTHER INFO
70y Female hx diabetes, HTN, HLD, now s/p lami/PSF/TLIF on 2/1. D/C pending PT clearance and drain output.     Pt seen resting in bed, endorses pain, +nausea yesterday but now resolved (ordered for zofran ODT and compazine), no vomiting. Last BM 1/31 per pt (endorses regular BM PTA). +flatus. Elmer score 20, no edema noted. Pt endorses good appetite, consuming >75% of meals. Also endorses good PO intake PTA, follows a regular diet but she knows she needs to better control her BG levels through her diet. States her A1c used to be 8%, now 7.7% (2/2). Denies food allergies. Pt states her UBW is ~180lb, denies weight changes PTA. Please see below for full nutritional recommendations- d/w team. RD to monitor and f/u per protocol.

## 2021-02-04 NOTE — PROGRESS NOTE ADULT - ASSESSMENT
Recommended Treatment PLAN:     Recommended Treatment PLAN:    -Morphine IR 7.5 mg PO q4h PRN moderate pain  -Morphine IR 15 mg PO q4h PRN severe pain  -Dilaudid 0.5 mg IV q4h PRN breakthrough pain  -Gabapentin 600 mg PO TID PRN neuropathic pain

## 2021-02-04 NOTE — DIETITIAN INITIAL EVALUATION ADULT. - OTHER CALCULATIONS
IBW used to calculate energy needs due to pt's current body weight exceeding 120% of IBW (139%). Needs adjusted for age, post-op, BMI. Aim for higher end of kcal range,

## 2021-02-04 NOTE — DIETITIAN INITIAL EVALUATION ADULT. - PERTINENT MEDS FT
Admelog sliding scale, morphine, miralax, senna, dilaudid, tylenol, lipitor, zofran ODT, protonix DR, compazine

## 2021-02-04 NOTE — PROGRESS NOTE ADULT - SUBJECTIVE AND OBJECTIVE BOX
Pain Management Progress Note - [facility] [phone]    HPI:  70F with low back pain x several months presented for surgery. Pt notes she has pain down bilateral lower extremities, right greater than left. She ambulates with no assistance at baseline. She has tried PT and injections that do not improve her pain. Pt endorses intermittent numbness and tingling to her legs that comes and goes. She presented for L5-S1 Lami.     Pain is ___ sharp ____dull ___burning _x__achy ___ Intensity: ____ mild ___mod ___severe     Location ____surgical site ____cervical ___x__lumbar ____abd ____upper ext____lower ext    Worse with ____activity __x__movement _____physical therapy___ Rest    Improved with _x___medication ____rest ____physical therapy    ROS: Const:  ___febrile   Eyes:___ENT:___CV: ___chest pain  Resp: ____sob  GI:___nausea ___vomiting ___abd pain ___npo ___clears __full diet __bm  :___ Musk: _x__pain ___spasm  Skin:___ Neuro:  ___sedation___confusion___ numbness ___weakness ___paresth  Psych:__anxiety  Endo:___ Heme:___Allergy:_________, ___all others reviewed and negative      morphine  IR  morphine  IR  sodium chloride 0.9% Bolus  polyethylene glycol 3350  senna  diphenhydrAMINE  diphenhydrAMINE  HYDROmorphone  Injectable  oxyCODONE    IR  oxyCODONE    IR  atorvastatin  prochlorperazine   Injectable  acetaminophen   Tablet ..  HYDROmorphone  Injectable  ondansetron Injectable  naloxone Injectable  HYDROmorphone PCA (1 mG/mL)  dextrose 50% Injectable  dextrose 50% Injectable  dextrose 50% Injectable  dextrose 40% Gel  insulin lispro (ADMELOG) corrective regimen sliding scale  ceFAZolin   IVPB  lactated ringers Bolus  pantoprazole    Tablet  atorvastatin Oral Tab/Cap - Peds  gabapentin  (Floorstock)  (Floorstock)  (Floorstock)  (Floorstock)  (Floorstock)  (Floorstock)  (Floorstock)  BUpivacaine liposome 1.3% Injectable (no eMAR)  (Floorstock)  (Floorstock)  glucagon  Injectable  dextrose 5%.  dextrose 50% Injectable  dextrose 50% Injectable  dextrose 50% Injectable  dextrose 5%.  dextrose 40% Gel  insulin lispro (ADMELOG) corrective regimen sliding scale  insulin lispro (ADMELOG) corrective regimen sliding scale  HYDROmorphone  Injectable  oxyCODONE    IR  methocarbamol  ondansetron   Disintegrating Tablet  prochlorperazine   Injectable  ceFAZolin   IVPB  HYDROmorphone  Injectable  acetaminophen   Tablet ..  lactated ringers.  (Floorstock)  (Floorstock)  (Floorstock)  (Floorstock)  (Floorstock)    02-04 @ 08:2192 mL/min/1.73M2    Hemoglobin: 10.8 g/dL (02-04 @ 08:21)  Hemoglobin: 10.5 g/dL (02-03 @ 06:56)    T(C): 36.9 (02-04-21 @ 08:42), Max: 38.1 (02-03-21 @ 20:56)  HR: 88 (02-04-21 @ 08:42) (75 - 89)  BP: 106/64 (02-04-21 @ 08:42) (106/64 - 151/67)  RR: 18 (02-04-21 @ 08:42) (15 - 18)  SpO2: 98% (02-04-21 @ 08:42) (94% - 98%)  Wt(kg): --    PHYSICAL EXAM:  Gen Appearance: _x__no acute distress __x_appropriate        Neuro: _x__SILT feet__x__ EOM Intact Psych: AAOX_3_, _x__mood/affect appropriate        Eyes: __x_conjunctiva WNL  ___x__ Pupils equal and round        ENT: _x_ears and nose atraumatic_x__ Hearing grossly intact        Neck: _x__trachea midline, no visible masses __x_thyroid without palpable mass    Resp: __x_Nml WOB___x_No tactile fremitus _x__clear to auscultation    Cardio: ___extremities free from edema __x__pedal pulses palpable    GI/Abdomen: _x__soft ___x__ Nontender____x__Nondistended_____HSM    Lymphatic: _x__no palpable nodes in neck  __x_no palpable nodes calves and feet    Skin/Wound: _x__Incision, __x_Dressing c/d/i,   ____surrounding tissues soft,  ___drain/chest tube present____    Muscular: EHL _5__/5  Gastrocnemius_5__/5    __x_absent clubbing/cyanosis      ASSESSMENT:  This is a 70y old Female with a history of:  M48.06    Handoff    MEWS Score    Colon polyp    HLD (hyperlipidemia)    Diabetes    HTN (hypertension)    TLIF, 1 level    Spinal stenosis of lumbar region, unspecified whether neurogenic claudication present    Postoperative state    Anemia due to acute blood loss    Type 2 diabetes mellitus without complication, without long-term current use of insulin    Pure hypercholesterolemia    Essential hypertension    HTN (hypertension)    HLD (hyperlipidemia)    Diabetes    Colon polyp    Spinal stenosis of lumbar region, unspecified whether neurogenic claudication present    TLIF, 1 level    History of back surgery    History of appendectomy    H/O abdominal hysterectomy    SysAdmin_VstLnk

## 2021-02-04 NOTE — PROGRESS NOTE ADULT - PROBLEM SELECTOR PLAN 4
Monitor hemoglobin. Hold transfusion unless hemoglobin is 7, 8 in patient with coronary artery disease, hemodynamic instability such as tachycardia/hypotension, or there is evidence of acute blood loss.  Anemia is due to postoperative blood loss
Monitor hemoglobin. Hold transfusion unless hemoglobin is 7, 8 in patient with coronary artery disease, hemodynamic instability such as tachycardia/hypotension, or there is evidence of acute blood loss.  Anemia is due to postoperative blood loss

## 2021-02-04 NOTE — PROGRESS NOTE ADULT - SUBJECTIVE AND OBJECTIVE BOX
Interval Events: Reviewed  Patient seen and examined at bedside.    Patient is a 70y old  Female who presents with a chief complaint of Back pain (04 Feb 2021 17:00)    she is doing well  PAST MEDICAL & SURGICAL HISTORY:  Colon polyp    HLD (hyperlipidemia)    Diabetes    HTN (hypertension)    History of back surgery  2011, lumbar    History of appendectomy  2012    H/O abdominal hysterectomy  30y ago        MEDICATIONS:  Pulmonary:    Antimicrobials:    Anticoagulants:    Cardiac:      Allergies    Dilaudid (Pruritus)  oxycodone (Pruritus)    Intolerances        Vital Signs Last 24 Hrs  T(C): 36.9 (04 Feb 2021 20:10), Max: 37.3 (04 Feb 2021 15:36)  T(F): 98.4 (04 Feb 2021 20:10), Max: 99.1 (04 Feb 2021 15:36)  HR: 88 (04 Feb 2021 20:10) (82 - 96)  BP: 118/60 (04 Feb 2021 20:10) (106/64 - 152/81)  BP(mean): 77 (04 Feb 2021 05:30) (77 - 77)  RR: 17 (04 Feb 2021 20:10) (17 - 18)  SpO2: 95% (04 Feb 2021 20:10) (95% - 98%)    02-03 @ 07:01 - 02-04 @ 07:00  --------------------------------------------------------  IN: 240 mL / OUT: 1907 mL / NET: -1667 mL    02-04 @ 07:01  -  02-04 @ 21:05  --------------------------------------------------------  IN: 0 mL / OUT: 1135 mL / NET: -1135 mL          Review of Systems:   •	General: negative  •	Skin/Breast: negative  •	Ophthalmologic: negative  •	ENMT: negative  •	Respiratory and Thorax: negative  •	Cardiovascular: negative  •	Gastrointestinal: negative  •	Genitourinary: negative  •	Musculoskeletal: negative  •	Neurological: negative  •	Psychiatric: negative  •	Hematology/Lymphatics: negative  •	Endocrine: negative  •	Allergic/Immunologic: negative    Physical Exam:   • Constitutional:	Well-developed, well nourished  • Eyes:	EOMI; PERRL; no drainage or redness  • ENMT:	No oral lesions; no gross abnormalities  • Neck	No bruits; no thyromegaly or nodules  • Breasts:	not examined  • Back:	No deformity or limitation of movement  • Respiratory:	Breath Sounds equal & clear to percussion & auscultation, no accessory muscle use  • Cardiovascular:	Regular rate & rhythm, normal S1, S2; no murmurs, gallops or rubs; no S3, S4  • Gastrointestinal:	Soft, non-tender, no hepatosplenomegaly, normal bowel sounds  • Genitourinary:	not examined  • Rectal: not examined  • Extremities:	No cyanosis, clubbing or edema  • Vascular:	Equal and normal pulses (carotid, femoral, dorsalis pedis)  • Neurologica:l	not examined  • Skin:	No lesions; no rash  • Lymph Nodes:	No lymphadedenopathy  • Musculoskeletal:	No joint pain, swelling or deformity; no limitation of movement        LABS:      CBC Full  -  ( 04 Feb 2021 08:21 )  WBC Count : 8.11 K/uL  RBC Count : 3.37 M/uL  Hemoglobin : 10.8 g/dL  Hematocrit : 32.6 %  Platelet Count - Automated : 221 K/uL  Mean Cell Volume : 96.7 fl  Mean Cell Hemoglobin : 32.0 pg  Mean Cell Hemoglobin Concentration : 33.1 gm/dL  Auto Neutrophil # : 5.95 K/uL  Auto Lymphocyte # : 1.47 K/uL  Auto Monocyte # : 0.58 K/uL  Auto Eosinophil # : 0.06 K/uL  Auto Basophil # : 0.03 K/uL  Auto Neutrophil % : 73.4 %  Auto Lymphocyte % : 18.1 %  Auto Monocyte % : 7.2 %  Auto Eosinophil % : 0.7 %  Auto Basophil % : 0.4 %    02-04    138  |  102  |  11  ----------------------------<  211<H>  3.9   |  25  |  0.59    Ca    8.9      04 Feb 2021 08:21                          RADIOLOGY & ADDITIONAL STUDIES (The following images were personally reviewed):  Fisher:                                     No  Urine output:                       adequate  DVT prophylaxis:                 Yes  Flattus:                                  Yes  Bowel movement:              No

## 2021-02-04 NOTE — PROGRESS NOTE ADULT - SUBJECTIVE AND OBJECTIVE BOX
Ortho Note    Procedure: L5-S1 lami/PLIF  Surgeon: Dr. Evangelista     pt seen and examined at bedside   was up and cleaning self in AM but reports dizziness after she was done   Denies CP, SOB, N/V, numbness/tingling     Vital Signs Last 24 Hrs  T(C): 36.4 (02-01-21 @ 11:28), Max: 36.4 (02-01-21 @ 11:28)  T(F): 97.5 (02-01-21 @ 11:28), Max: 97.5 (02-01-21 @ 11:28)  HR: 68 (02-01-21 @ 13:28) (67 - 73)  BP: 108/55 (02-01-21 @ 13:28) (82/47 - 111/57)  BP(mean): 74 (02-01-21 @ 13:28) (60 - 80)  RR: 13 (02-01-21 @ 13:28) (13 - 16)  SpO2: 99% (02-01-21 @ 13:28) (95% - 99%)      General: Pt Alert and oriented, NAD  Dressing C/D/I: lumbar dressing in tact- HV x2   Fisher in place   Pulses: 2+ DP pulses in tact   Sensation: intact to light touch   Motor: EHL/FHL/TA/GS 5/5 bilateral LE         A/P: 70yFemale POD#3 s/p L5-S1 lami/PLIF     - Stable in PACU   - c/w IV and PO Pain Control  - DVT ppx: SCDs  - PT, WBS: WBAT   -monitor drain outputs     Ortho Pager [phone] Ortho Note    Procedure: L5-S1 lami/PLIF  Surgeon: Dr. Evangelista     pt seen and examined at bedside   was up and cleaning self in AM but reports dizziness after she was done   Denies CP, SOB, N/V, numbness/tingling     Vital Signs Last 24 Hrs  T(C): 36.4 (02-01-21 @ 11:28), Max: 36.4 (02-01-21 @ 11:28)  T(F): 97.5 (02-01-21 @ 11:28), Max: 97.5 (02-01-21 @ 11:28)  HR: 68 (02-01-21 @ 13:28) (67 - 73)  BP: 108/55 (02-01-21 @ 13:28) (82/47 - 111/57)  BP(mean): 74 (02-01-21 @ 13:28) (60 - 80)  RR: 13 (02-01-21 @ 13:28) (13 - 16)  SpO2: 99% (02-01-21 @ 13:28) (95% - 99%)      General: Pt Alert and oriented, NAD  Dressing C/D/I: lumbar dressing in tact- HV x2   Fisher in place   Pulses: 2+ DP pulses in tact   Sensation: intact to light touch   Motor: EHL/FHL/TA/GS 5/5 bilateral LE         A/P: 70yFemale POD#3 s/p L5-S1 lami/PLIF     - Stable   - c/w IV and PO Pain Control  - DVT ppx: SCDs  - PT, WBS: WBAT   -monitor drain outputs     Ortho Pager [phone]

## 2021-02-05 VITALS
TEMPERATURE: 98 F | RESPIRATION RATE: 18 BRPM | SYSTOLIC BLOOD PRESSURE: 120 MMHG | OXYGEN SATURATION: 96 % | DIASTOLIC BLOOD PRESSURE: 62 MMHG | HEART RATE: 67 BPM

## 2021-02-05 LAB
ANION GAP SERPL CALC-SCNC: 11 MMOL/L — SIGNIFICANT CHANGE UP (ref 5–17)
BUN SERPL-MCNC: 9 MG/DL — SIGNIFICANT CHANGE UP (ref 7–23)
CALCIUM SERPL-MCNC: 8.9 MG/DL — SIGNIFICANT CHANGE UP (ref 8.4–10.5)
CHLORIDE SERPL-SCNC: 104 MMOL/L — SIGNIFICANT CHANGE UP (ref 96–108)
CO2 SERPL-SCNC: 27 MMOL/L — SIGNIFICANT CHANGE UP (ref 22–31)
CREAT SERPL-MCNC: 0.65 MG/DL — SIGNIFICANT CHANGE UP (ref 0.5–1.3)
GLUCOSE BLDC GLUCOMTR-MCNC: 187 MG/DL — HIGH (ref 70–99)
GLUCOSE BLDC GLUCOMTR-MCNC: 284 MG/DL — HIGH (ref 70–99)
GLUCOSE SERPL-MCNC: 195 MG/DL — HIGH (ref 70–99)
HCT VFR BLD CALC: 31.2 % — LOW (ref 34.5–45)
HGB BLD-MCNC: 10.3 G/DL — LOW (ref 11.5–15.5)
MCHC RBC-ENTMCNC: 31.8 PG — SIGNIFICANT CHANGE UP (ref 27–34)
MCHC RBC-ENTMCNC: 33 GM/DL — SIGNIFICANT CHANGE UP (ref 32–36)
MCV RBC AUTO: 96.3 FL — SIGNIFICANT CHANGE UP (ref 80–100)
NRBC # BLD: 0 /100 WBCS — SIGNIFICANT CHANGE UP (ref 0–0)
PLATELET # BLD AUTO: 233 K/UL — SIGNIFICANT CHANGE UP (ref 150–400)
POTASSIUM SERPL-MCNC: 4.1 MMOL/L — SIGNIFICANT CHANGE UP (ref 3.5–5.3)
POTASSIUM SERPL-SCNC: 4.1 MMOL/L — SIGNIFICANT CHANGE UP (ref 3.5–5.3)
RBC # BLD: 3.24 M/UL — LOW (ref 3.8–5.2)
RBC # FLD: 12.9 % — SIGNIFICANT CHANGE UP (ref 10.3–14.5)
SODIUM SERPL-SCNC: 142 MMOL/L — SIGNIFICANT CHANGE UP (ref 135–145)
WBC # BLD: 6.4 K/UL — SIGNIFICANT CHANGE UP (ref 3.8–10.5)
WBC # FLD AUTO: 6.4 K/UL — SIGNIFICANT CHANGE UP (ref 3.8–10.5)

## 2021-02-05 PROCEDURE — C1889: CPT

## 2021-02-05 PROCEDURE — 86901 BLOOD TYPING SEROLOGIC RH(D): CPT

## 2021-02-05 PROCEDURE — 85027 COMPLETE CBC AUTOMATED: CPT

## 2021-02-05 PROCEDURE — C1713: CPT

## 2021-02-05 PROCEDURE — 97161 PT EVAL LOW COMPLEX 20 MIN: CPT

## 2021-02-05 PROCEDURE — 85025 COMPLETE CBC W/AUTO DIFF WBC: CPT

## 2021-02-05 PROCEDURE — 72100 X-RAY EXAM L-S SPINE 2/3 VWS: CPT

## 2021-02-05 PROCEDURE — 82962 GLUCOSE BLOOD TEST: CPT

## 2021-02-05 PROCEDURE — 97116 GAIT TRAINING THERAPY: CPT

## 2021-02-05 PROCEDURE — 86850 RBC ANTIBODY SCREEN: CPT

## 2021-02-05 PROCEDURE — 86900 BLOOD TYPING SEROLOGIC ABO: CPT

## 2021-02-05 PROCEDURE — 76000 FLUOROSCOPY <1 HR PHYS/QHP: CPT

## 2021-02-05 PROCEDURE — 36415 COLL VENOUS BLD VENIPUNCTURE: CPT

## 2021-02-05 PROCEDURE — 80048 BASIC METABOLIC PNL TOTAL CA: CPT

## 2021-02-05 PROCEDURE — 83036 HEMOGLOBIN GLYCOSYLATED A1C: CPT

## 2021-02-05 PROCEDURE — 97530 THERAPEUTIC ACTIVITIES: CPT

## 2021-02-05 RX ORDER — POLYETHYLENE GLYCOL 3350 17 G/17G
17 POWDER, FOR SOLUTION ORAL
Qty: 0 | Refills: 0 | DISCHARGE
Start: 2021-02-05

## 2021-02-05 RX ORDER — METHOCARBAMOL 500 MG/1
1 TABLET, FILM COATED ORAL
Qty: 21 | Refills: 0
Start: 2021-02-05 | End: 2021-02-11

## 2021-02-05 RX ORDER — ACETAMINOPHEN 500 MG
2 TABLET ORAL
Qty: 0 | Refills: 0 | DISCHARGE
Start: 2021-02-05

## 2021-02-05 RX ORDER — MORPHINE SULFATE 50 MG/1
0.5 CAPSULE, EXTENDED RELEASE ORAL
Qty: 21 | Refills: 0
Start: 2021-02-05 | End: 2021-02-11

## 2021-02-05 RX ORDER — SENNA PLUS 8.6 MG/1
2 TABLET ORAL
Qty: 0 | Refills: 0 | DISCHARGE
Start: 2021-02-05

## 2021-02-05 RX ADMIN — MORPHINE SULFATE 15 MILLIGRAM(S): 50 CAPSULE, EXTENDED RELEASE ORAL at 08:32

## 2021-02-05 RX ADMIN — MORPHINE SULFATE 15 MILLIGRAM(S): 50 CAPSULE, EXTENDED RELEASE ORAL at 13:15

## 2021-02-05 RX ADMIN — METHOCARBAMOL 500 MILLIGRAM(S): 500 TABLET, FILM COATED ORAL at 05:07

## 2021-02-05 RX ADMIN — Medication 1000 MILLIGRAM(S): at 05:07

## 2021-02-05 RX ADMIN — POLYETHYLENE GLYCOL 3350 17 GRAM(S): 17 POWDER, FOR SOLUTION ORAL at 08:32

## 2021-02-05 RX ADMIN — Medication 2: at 07:24

## 2021-02-05 RX ADMIN — Medication 6: at 12:17

## 2021-02-05 RX ADMIN — Medication 4 UNIT(S): at 07:24

## 2021-02-05 RX ADMIN — PANTOPRAZOLE SODIUM 40 MILLIGRAM(S): 20 TABLET, DELAYED RELEASE ORAL at 05:08

## 2021-02-05 RX ADMIN — Medication 4 UNIT(S): at 12:17

## 2021-02-05 RX ADMIN — METHOCARBAMOL 500 MILLIGRAM(S): 500 TABLET, FILM COATED ORAL at 13:15

## 2021-02-05 RX ADMIN — Medication 1000 MILLIGRAM(S): at 13:15

## 2021-02-05 NOTE — PROGRESS NOTE ADULT - PROVIDER SPECIALTY LIST ADULT
Orthopedics
Pain Medicine
Pain Medicine
Internal Medicine
Internal Medicine

## 2021-02-05 NOTE — PROGRESS NOTE ADULT - SUBJECTIVE AND OBJECTIVE BOX
Ortho Note    Procedure: L5-S1 lami/PLIF  Surgeon: Dr. Evangelista     pt seen and examined at bedside   no complaints this AM  in bed somnolent   Denies CP, SOB, N/V, numbness/tingling     Vital Signs Last 24 Hrs  T(C): 36.4 (02-01-21 @ 11:28), Max: 36.4 (02-01-21 @ 11:28)  T(F): 97.5 (02-01-21 @ 11:28), Max: 97.5 (02-01-21 @ 11:28)  HR: 68 (02-01-21 @ 13:28) (67 - 73)  BP: 108/55 (02-01-21 @ 13:28) (82/47 - 111/57)  BP(mean): 74 (02-01-21 @ 13:28) (60 - 80)  RR: 13 (02-01-21 @ 13:28) (13 - 16)  SpO2: 99% (02-01-21 @ 13:28) (95% - 99%)      General: Pt Alert and oriented, NAD  Dressing C/D/I: lumbar dressing in tact- HV x2   Fisher in place   Pulses: 2+ DP pulses in tact   Sensation: intact to light touch   Motor: EHL/FHL/TA/GS 5/5 bilateral LE         A/P: 70yFemale POD#4 s/p L5-S1 lami/PLIF     - Stable in PACU   - c/w IV and PO Pain Control  - DVT ppx: SCDs  - PT, WBS: WBAT   -monitor drain outputs     Ortho Pager [phone] Ortho Note    Procedure: L5-S1 lami/PLIF  Surgeon: Dr. Evangelista     pt seen and examined at bedside   no complaints this AM  in bed somnolent   Denies CP, SOB, N/V, numbness/tingling     Vital Signs Last 24 Hrs  T(C): 36.4 (02-01-21 @ 11:28), Max: 36.4 (02-01-21 @ 11:28)  T(F): 97.5 (02-01-21 @ 11:28), Max: 97.5 (02-01-21 @ 11:28)  HR: 68 (02-01-21 @ 13:28) (67 - 73)  BP: 108/55 (02-01-21 @ 13:28) (82/47 - 111/57)  BP(mean): 74 (02-01-21 @ 13:28) (60 - 80)  RR: 13 (02-01-21 @ 13:28) (13 - 16)  SpO2: 99% (02-01-21 @ 13:28) (95% - 99%)      General: Pt Alert and oriented, NAD  Dressing C/D/I: lumbar dressing in tact- HV x2   Fisher in place   Pulses: 2+ DP pulses in tact   Sensation: intact to light touch   Motor: EHL/FHL/TA/GS 5/5 bilateral LE         A/P: 70yFemale POD#4 s/p L5-S1 lami/PLIF     - Stable   - c/w IV and PO Pain Control  - DVT ppx: SCDs  - PT, WBS: WBAT   -monitor drain outputs     Ortho Pager [phone]

## 2021-02-05 NOTE — PROGRESS NOTE ADULT - SUBJECTIVE AND OBJECTIVE BOX
Ortho Note    Pt comfortable without complaints, pain controlled  Denies CP, SOB, N/V, numbness/tingling     Vital Signs Last 24 Hrs  T(C): 36.7 (02-05-21 @ 08:19), Max: 36.7 (02-05-21 @ 08:19)  T(F): 98.1 (02-05-21 @ 08:19), Max: 98.1 (02-05-21 @ 08:19)  HR: 67 (02-05-21 @ 08:19) (67 - 67)  BP: 120/62 (02-05-21 @ 08:19) (120/62 - 120/62)  BP(mean): --  RR: 18 (02-05-21 @ 08:19) (18 - 18)  SpO2: 96% (02-05-21 @ 08:19) (96% - 96%)  AVSS    General: Pt Alert and oriented, NAD  DSG: HV x2 removed under aseptic technique, new dry dressing applied lumbar  Pulses: bilateral pedal 2+< wwp toes, cap refill <3sec toes  Sensation: bilateral SILT  Motor: bilateral 5/5 EHL/FHL/TA/GS        A/P: 70y Female POD#4 s/p lami/PSF L5-S1  - Stable, afebrile, nontoxic appearance, IS encouraged  - Pain Control: po meds  - DVT ppx: SCDS  - PT, WBS: WBAT spinal precautions  -Bowel regimen: continue bowel regimen  -Dispo: home today pending PT clearance    Ortho Pager [phone]

## 2021-02-11 DIAGNOSIS — D62 ACUTE POSTHEMORRHAGIC ANEMIA: ICD-10-CM

## 2021-02-11 DIAGNOSIS — M51.27 OTHER INTERVERTEBRAL DISC DISPLACEMENT, LUMBOSACRAL REGION: ICD-10-CM

## 2021-02-11 DIAGNOSIS — I10 ESSENTIAL (PRIMARY) HYPERTENSION: ICD-10-CM

## 2021-02-11 DIAGNOSIS — M48.061 SPINAL STENOSIS, LUMBAR REGION WITHOUT NEUROGENIC CLAUDICATION: ICD-10-CM

## 2021-02-11 DIAGNOSIS — E11.40 TYPE 2 DIABETES MELLITUS WITH DIABETIC NEUROPATHY, UNSPECIFIED: ICD-10-CM

## 2021-02-11 DIAGNOSIS — E78.5 HYPERLIPIDEMIA, UNSPECIFIED: ICD-10-CM

## 2021-02-11 DIAGNOSIS — K63.5 POLYP OF COLON: ICD-10-CM

## 2021-11-18 NOTE — PHYSICAL THERAPY INITIAL EVALUATION ADULT - PRECAUTIONS/LIMITATIONS, REHAB EVAL
fall precautions/spinal precautions Azathioprine Pregnancy And Lactation Text: This medication is Pregnancy Category D and isn't considered safe during pregnancy. It is unknown if this medication is excreted in breast milk.

## 2022-05-18 NOTE — DISCHARGE NOTE PROVIDER - NSDCHHPTASSISTHOME_GEN_ALL_CORE
[facility] notified of results    Electronically Signed by:    Richa Brannon, [address] , 5/18/2022 Patient Needs Assistance to Leave Residence...

## 2023-07-27 NOTE — PRE-OP CHECKLIST - NS PREOP CHK MONITOR ANESTHESIA CONSENT
Message sent to pt via [employer] : \" Sandro Ye, appears [facility] was trying to reach you cause we had gotten a refill request for Zofran as needed for nausea. Did appear we had filled that since 8/15/22 so [facility] was checking to see if you had requested this refill/still using medication? \".   done

## 2024-04-30 PROBLEM — I10 ESSENTIAL (PRIMARY) HYPERTENSION: Chronic | Status: ACTIVE | Noted: 2021-01-29

## 2024-04-30 PROBLEM — E11.9 TYPE 2 DIABETES MELLITUS WITHOUT COMPLICATIONS: Chronic | Status: ACTIVE | Noted: 2021-01-29

## 2024-04-30 PROBLEM — E78.5 HYPERLIPIDEMIA, UNSPECIFIED: Chronic | Status: ACTIVE | Noted: 2021-01-29

## 2024-05-03 RX ORDER — POVIDONE-IODINE 5 %
1 AEROSOL (ML) TOPICAL ONCE
Refills: 0 | Status: COMPLETED | OUTPATIENT
Start: 2024-05-13 | End: 2024-05-13

## 2024-05-03 NOTE — H&P ADULT - NSHPPHYSICALEXAM_GEN_ALL_CORE
GENERAL:  PE:  Decreased ROM secondary to pain. Rest of PE per medical clearance. MSK:  Decreased ROM lumbar spine secondary to pain. Rest of PE per medical clearance. Skin warm and well perfused. DP pulses palpable bilateral lower extremities. Sensation to bilateral lower extremities, states has numbness to bilateral feet. EHL/TA/GS/FHL 5/5 bilateral lower extremities.   Decreased ROM lumbar spine secondary to pain. Rest of PE per medical clearance.

## 2024-05-03 NOTE — H&P ADULT - PROBLEM SELECTOR PLAN 1
Admit to Orthopaedic Service.  Presents today for elective LAMINECTOMY WITH POSTEROLATERAL FUSION L4-L5 with TLIF, RANJITH and REINSTRUMENTATION L4-S1  Pt medically stable and cleared for procedure today by Dr. Valerio (cardio). Admit to Orthopaedic Service.  Presents today for elective LAMINECTOMY WITH POSTEROLATERAL FUSION L4-L5 with TLIF, RANJITH and REINSTRUMENTATION L4-S1  Pt medically stable and cleared for procedure today by Dr. Santiago (cardio). Admit to Orthopaedic Service.  Presents today for elective LAMINECTOMY WITH POSTEROLATERAL FUSION L4-L5 with TLIF, RANJITH and REINSTRUMENTATION L4-S1  Pt medically stable and cleared for procedure today by Dr. Santiago (cardio), Jazlyn CALERO

## 2024-05-03 NOTE — H&P ADULT - HISTORY OF PRESENT ILLNESS
73yo f c/o low back pain x   Presents today for elective LAMINECTOMY WITH POSTEROLATERAL FUSION L4-L5 with TLIF, RANJITH and REINSTRUMENTATION L4-S1.  73 y/o f c/o low back pain x 1 year. States back pain radiates down bilateral legs. States she has numbness to bilateral feet. Uses assistive walking device to help with ambulation Takes tramadol as needed for her pain without relief. Failed conservative therapies for her symptoms. Denies history of blood clot.   Presents today for elective LAMINECTOMY WITH POSTEROLATERAL FUSION L4-L5 with TLIF, RANJITH and REINSTRUMENTATION L4-S1.

## 2024-05-03 NOTE — H&P ADULT - PROBLEM SELECTOR PLAN 3
ISS, FS Insulin sliding scale to be ordered while inpatient, monitor finger sticks, diabetic diet, continue appropriate medications

## 2024-05-03 NOTE — H&P ADULT - PROBLEM SELECTOR PLAN 2
----- Message from Claudia King CMA sent at 11/21/2022  8:06 AM CST -----  Regarding: NB-UVB Light Therapy  Contact: Pt @ cell [phone]  Caller:Pt  Reason for call: Schedule appt from active request. Pt would like to be scheduled in Jan 2023.  Please call.      Continue home medication

## 2024-05-03 NOTE — H&P ADULT - NSHPLABSRESULTS_GEN_ALL_CORE
labs-   EKG- NSR  h/h-  [facility]alt- preop cbc/bmp within normal limits, reviewed by medical clearance; h/h 11.7/36.1 preop; Cr 0.74 preop   EKG- NSR  Povidone iodine nasal swab to be given day of surgery   coags ordered for day of surgery show

## 2024-05-03 NOTE — H&P ADULT - BIRTH SEX
Female Cimzia Pregnancy And Lactation Text: This medication crosses the placenta but can be considered safe in certain situations. Cimzia may be excreted in breast milk.

## 2024-05-10 VITALS
OXYGEN SATURATION: 97 % | HEIGHT: 66 IN | SYSTOLIC BLOOD PRESSURE: 154 MMHG | WEIGHT: 166.23 LBS | DIASTOLIC BLOOD PRESSURE: 86 MMHG | RESPIRATION RATE: 16 BRPM | TEMPERATURE: 98 F | HEART RATE: 78 BPM

## 2024-05-10 RX ORDER — PANTOPRAZOLE SODIUM 20 MG/1
1 TABLET, DELAYED RELEASE ORAL
Qty: 0 | Refills: 0 | DISCHARGE

## 2024-05-10 RX ORDER — GABAPENTIN 400 MG/1
1 CAPSULE ORAL
Qty: 0 | Refills: 0 | DISCHARGE

## 2024-05-10 RX ORDER — LISINOPRIL 2.5 MG/1
1 TABLET ORAL
Qty: 0 | Refills: 0 | DISCHARGE

## 2024-05-10 RX ORDER — GLIMEPIRIDE 1 MG
1 TABLET ORAL
Qty: 0 | Refills: 0 | DISCHARGE

## 2024-05-10 RX ORDER — ATORVASTATIN CALCIUM 80 MG/1
1 TABLET, FILM COATED ORAL
Qty: 0 | Refills: 0 | DISCHARGE

## 2024-05-10 RX ORDER — METFORMIN HYDROCHLORIDE 850 MG/1
1 TABLET ORAL
Qty: 0 | Refills: 0 | DISCHARGE

## 2024-05-10 NOTE — PATIENT PROFILE ADULT - NSPROGENPREVTRANSFREACT_GEN_A_NUR
Right Buccal Filler Volume In Cc: 0 Show Tear Troughs Volume?: Yes Show Right And Left Zygomatic Arches Volume?: No Anesthesia Type: 1% lidocaine with epinephrine Dilution Method: Sculptra was reconstituted with 8mL of sterile water and 2cc of 2% plain lidocaine for a total volume of 10ccs for each vial. Map Statement: See Attached Map for Complete Details. Anesthesia Volume In Cc: 0.2 Injection Technique: The Sculptra was injected with a 25g cannula to the listed areas after removing makeup, and cleansed with 70% isopropyl alcohol. Volumizer: Sculptra Consent obtained and risk reviewed. Detail Level: Detailed Vials Reconstituted (Required For Inventory): 1 Additional Anesthesia Volume In Cc: 6 Show Inventory Tab: Show Post-Care Instructions: Patient instructed to apply ice to reduce swelling and reviewed post procedural massage. NONE

## 2024-05-10 NOTE — PATIENT PROFILE ADULT - FALL HARM RISK - RISK INTERVENTIONS

## 2024-05-10 NOTE — PATIENT PROFILE ADULT - HAVE YOU BEEN EATING POORLY BECAUSE OF A DECREASED APPETITE?
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 Discussed purpose, randomization procedure, risks vs  benefits, and treatment. Reviewed the potential side effects of Venetoclax and Obinutuzumab. Advised there is a questionnaire to be filled out at 8 different time points. Study involves blood collection and tissue will be sent from bone marrow aspirations. In addition there is optional specimens of blood, tissue and buccal swab. There is MRD testing on blood sent to central lab and we do not receive these results. Explained that patient has right to withdraw from study tx or optional collections at any time. Reviewed imaging risks. The study drugs are provided by sponsor but everything else will be billed to insurance. Emphasized importance of privacy , what persons will have access to her medical and research records. Reviewed how we keep her research reports, forms and specimens confidential with a Study ID and no PHI. Patient asked several questions appropriately and all were addressed. She signed consent to study, version date 6/10/22. Also signed HIPAA for study and consented \"YES\" to the collection of optional specimens. Copy given back for her records. Optional Whole blood x 3 lavendar EDTA tubes were drawn at 2:40 pm, per protocol for pre-registration timepoint.      Medical & surgical  history was reviewed with start dates:   Headaches, related to her neck pain, post operatively since 2013, ongoing \"off and on\"  Lap Hysterectomy (only 1 ovary removed) 2016  Back muscle spasms, began on 4/20/2013, ongoing, occasionally  Cholecystectomy 2004  Depression 2003, ongoing, has been able to cope and do self care to avoid medications. Anxiety 2003 ongoing, able to cope effectively    Conmeds reviewed and noted. Instructed to avoid grapefruit and seville oranges and CYP3A substrates. Informed patient that we will call once we register/randomize her. Treating investigator made aware of consent today. No (0)

## 2024-05-12 ENCOUNTER — TRANSCRIPTION ENCOUNTER (OUTPATIENT)
Age: 74
End: 2024-05-12

## 2024-05-13 ENCOUNTER — INPATIENT (INPATIENT)
Facility: HOSPITAL | Age: 74
LOS: 5 days | Discharge: HOME HEALTH | End: 2024-05-19
Attending: ORTHOPAEDIC SURGERY | Admitting: ORTHOPAEDIC SURGERY
Payer: MEDICARE

## 2024-05-13 DIAGNOSIS — E78.5 HYPERLIPIDEMIA, UNSPECIFIED: ICD-10-CM

## 2024-05-13 DIAGNOSIS — Z98.890 OTHER SPECIFIED POSTPROCEDURAL STATES: Chronic | ICD-10-CM

## 2024-05-13 DIAGNOSIS — Z90.710 ACQUIRED ABSENCE OF BOTH CERVIX AND UTERUS: Chronic | ICD-10-CM

## 2024-05-13 DIAGNOSIS — I10 ESSENTIAL (PRIMARY) HYPERTENSION: ICD-10-CM

## 2024-05-13 DIAGNOSIS — E11.9 TYPE 2 DIABETES MELLITUS WITHOUT COMPLICATIONS: ICD-10-CM

## 2024-05-13 DIAGNOSIS — Z87.39 PERSONAL HISTORY OF OTHER DISEASES OF THE MUSCULOSKELETAL SYSTEM AND CONNECTIVE TISSUE: ICD-10-CM

## 2024-05-13 DIAGNOSIS — Z90.49 ACQUIRED ABSENCE OF OTHER SPECIFIED PARTS OF DIGESTIVE TRACT: Chronic | ICD-10-CM

## 2024-05-13 DIAGNOSIS — Z98.51 TUBAL LIGATION STATUS: Chronic | ICD-10-CM

## 2024-05-13 LAB
ALBUMIN SERPL ELPH-MCNC: 4.3 G/DL — SIGNIFICANT CHANGE UP (ref 3.3–5)
ALP SERPL-CCNC: 63 U/L — SIGNIFICANT CHANGE UP (ref 40–120)
ALT FLD-CCNC: 18 U/L — SIGNIFICANT CHANGE UP (ref 10–45)
ANION GAP SERPL CALC-SCNC: 14 MMOL/L — SIGNIFICANT CHANGE UP (ref 5–17)
APTT BLD: 27.4 SEC — SIGNIFICANT CHANGE UP (ref 24.5–35.6)
APTT BLD: 28 SEC — SIGNIFICANT CHANGE UP (ref 24.5–35.6)
AST SERPL-CCNC: 31 U/L — SIGNIFICANT CHANGE UP (ref 10–40)
BASE EXCESS BLDV CALC-SCNC: -3.9 MMOL/L — LOW (ref -2–3)
BILIRUB SERPL-MCNC: 0.2 MG/DL — SIGNIFICANT CHANGE UP (ref 0.2–1.2)
BUN SERPL-MCNC: 9 MG/DL — SIGNIFICANT CHANGE UP (ref 7–23)
CA-I SERPL-SCNC: 1.19 MMOL/L — SIGNIFICANT CHANGE UP (ref 1.15–1.33)
CALCIUM SERPL-MCNC: 9.3 MG/DL — SIGNIFICANT CHANGE UP (ref 8.4–10.5)
CHLORIDE SERPL-SCNC: 99 MMOL/L — SIGNIFICANT CHANGE UP (ref 96–108)
CO2 BLDV-SCNC: 27.1 MMOL/L — HIGH (ref 22–26)
CO2 SERPL-SCNC: 24 MMOL/L — SIGNIFICANT CHANGE UP (ref 22–31)
CREAT SERPL-MCNC: 0.61 MG/DL — SIGNIFICANT CHANGE UP (ref 0.5–1.3)
EGFR: 93 ML/MIN/1.73M2 — SIGNIFICANT CHANGE UP
GAS PNL BLDV: 134 MMOL/L — LOW (ref 136–145)
GAS PNL BLDV: SIGNIFICANT CHANGE UP
GLUCOSE BLDC GLUCOMTR-MCNC: 126 MG/DL — HIGH (ref 70–99)
GLUCOSE BLDC GLUCOMTR-MCNC: 129 MG/DL — HIGH (ref 70–99)
GLUCOSE BLDC GLUCOMTR-MCNC: 130 MG/DL — HIGH (ref 70–99)
GLUCOSE BLDC GLUCOMTR-MCNC: 133 MG/DL — HIGH (ref 70–99)
GLUCOSE BLDC GLUCOMTR-MCNC: 155 MG/DL — HIGH (ref 70–99)
GLUCOSE SERPL-MCNC: 149 MG/DL — HIGH (ref 70–99)
HCO3 BLDV-SCNC: 25 MMOL/L — SIGNIFICANT CHANGE UP (ref 22–29)
HCT VFR BLD CALC: 30 % — LOW (ref 34.5–45)
HGB BLD-MCNC: 9.7 G/DL — LOW (ref 11.5–15.5)
INR BLD: 0.9 — SIGNIFICANT CHANGE UP (ref 0.85–1.18)
INR BLD: 0.96 — SIGNIFICANT CHANGE UP (ref 0.85–1.18)
LACTATE SERPL-SCNC: 3 MMOL/L — HIGH (ref 0.5–2)
LACTATE SERPL-SCNC: 5.1 MMOL/L — CRITICAL HIGH (ref 0.5–2)
MCHC RBC-ENTMCNC: 30.9 PG — SIGNIFICANT CHANGE UP (ref 27–34)
MCHC RBC-ENTMCNC: 32.3 GM/DL — SIGNIFICANT CHANGE UP (ref 32–36)
MCV RBC AUTO: 95.5 FL — SIGNIFICANT CHANGE UP (ref 80–100)
NRBC # BLD: 0 /100 WBCS — SIGNIFICANT CHANGE UP (ref 0–0)
PCO2 BLDV: 63 MMHG — HIGH (ref 39–42)
PH BLDV: 7.21 — LOW (ref 7.32–7.43)
PLATELET # BLD AUTO: 286 K/UL — SIGNIFICANT CHANGE UP (ref 150–400)
PO2 BLDV: <33 MMHG — LOW (ref 25–45)
POTASSIUM BLDV-SCNC: 5 MMOL/L — SIGNIFICANT CHANGE UP (ref 3.5–5.1)
POTASSIUM SERPL-MCNC: 4.7 MMOL/L — SIGNIFICANT CHANGE UP (ref 3.5–5.3)
POTASSIUM SERPL-SCNC: 4.7 MMOL/L — SIGNIFICANT CHANGE UP (ref 3.5–5.3)
PROT SERPL-MCNC: 6.7 G/DL — SIGNIFICANT CHANGE UP (ref 6–8.3)
PROTHROM AB SERPL-ACNC: 10.3 SEC — SIGNIFICANT CHANGE UP (ref 9.5–13)
PROTHROM AB SERPL-ACNC: 11 SEC — SIGNIFICANT CHANGE UP (ref 9.5–13)
RBC # BLD: 3.14 M/UL — LOW (ref 3.8–5.2)
RBC # FLD: 14.4 % — SIGNIFICANT CHANGE UP (ref 10.3–14.5)
SAO2 % BLDV: 31.5 % — LOW (ref 67–88)
SODIUM SERPL-SCNC: 137 MMOL/L — SIGNIFICANT CHANGE UP (ref 135–145)
WBC # BLD: 11.14 K/UL — HIGH (ref 3.8–10.5)
WBC # FLD AUTO: 11.14 K/UL — HIGH (ref 3.8–10.5)

## 2024-05-13 DEVICE — CONN SFX TITAN MED SZ A6 CROSS: Type: IMPLANTABLE DEVICE | Status: FUNCTIONAL

## 2024-05-13 DEVICE — SURGIFLO HEMOSTATIC MATRIX KIT: Type: IMPLANTABLE DEVICE | Status: FUNCTIONAL

## 2024-05-13 DEVICE — GRAFT BONE INFUSE KIT MED: Type: IMPLANTABLE DEVICE | Status: FUNCTIONAL

## 2024-05-13 DEVICE — ROD PRE-LORDOSED W/LINE 65MM: Type: IMPLANTABLE DEVICE | Status: FUNCTIONAL

## 2024-05-13 DEVICE — SCREW CORT VIPER FIX TI 6X45MM: Type: IMPLANTABLE DEVICE | Status: FUNCTIONAL

## 2024-05-13 DEVICE — SCREW SET: Type: IMPLANTABLE DEVICE | Status: FUNCTIONAL

## 2024-05-13 DEVICE — SURGIFOAM PAD 8CM X 12.5CM X 10MM (100): Type: IMPLANTABLE DEVICE | Status: FUNCTIONAL

## 2024-05-13 DEVICE — CAGE RISE 12X26 10-17 15 DEG: Type: IMPLANTABLE DEVICE | Status: FUNCTIONAL

## 2024-05-13 RX ORDER — CEFAZOLIN SODIUM 1 G
2000 VIAL (EA) INJECTION EVERY 8 HOURS
Refills: 0 | Status: COMPLETED | OUTPATIENT
Start: 2024-05-13 | End: 2024-05-14

## 2024-05-13 RX ORDER — AMITRIPTYLINE HCL 25 MG
50 TABLET ORAL AT BEDTIME
Refills: 0 | Status: DISCONTINUED | OUTPATIENT
Start: 2024-05-13 | End: 2024-05-14

## 2024-05-13 RX ORDER — INSULIN LISPRO 100/ML
VIAL (ML) SUBCUTANEOUS
Refills: 0 | Status: DISCONTINUED | OUTPATIENT
Start: 2024-05-13 | End: 2024-05-19

## 2024-05-13 RX ORDER — ATORVASTATIN CALCIUM 80 MG/1
1 TABLET, FILM COATED ORAL
Refills: 0 | DISCHARGE

## 2024-05-13 RX ORDER — SODIUM CHLORIDE 9 MG/ML
1000 INJECTION, SOLUTION INTRAVENOUS
Refills: 0 | Status: DISCONTINUED | OUTPATIENT
Start: 2024-05-13 | End: 2024-05-19

## 2024-05-13 RX ORDER — LISINOPRIL 2.5 MG/1
20 TABLET ORAL DAILY
Refills: 0 | Status: DISCONTINUED | OUTPATIENT
Start: 2024-05-13 | End: 2024-05-19

## 2024-05-13 RX ORDER — APREPITANT 80 MG/1
40 CAPSULE ORAL ONCE
Refills: 0 | Status: COMPLETED | OUTPATIENT
Start: 2024-05-13 | End: 2024-05-13

## 2024-05-13 RX ORDER — PANTOPRAZOLE SODIUM 20 MG/1
1 TABLET, DELAYED RELEASE ORAL
Refills: 0 | DISCHARGE

## 2024-05-13 RX ORDER — DEXTROSE 50 % IN WATER 50 %
25 SYRINGE (ML) INTRAVENOUS ONCE
Refills: 0 | Status: DISCONTINUED | OUTPATIENT
Start: 2024-05-13 | End: 2024-05-19

## 2024-05-13 RX ORDER — HYDROMORPHONE HYDROCHLORIDE 2 MG/ML
0.5 INJECTION INTRAMUSCULAR; INTRAVENOUS; SUBCUTANEOUS
Refills: 0 | Status: DISCONTINUED | OUTPATIENT
Start: 2024-05-13 | End: 2024-05-13

## 2024-05-13 RX ORDER — GLIMEPIRIDE 1 MG
1 TABLET ORAL
Refills: 0 | DISCHARGE

## 2024-05-13 RX ORDER — LANOLIN ALCOHOL/MO/W.PET/CERES
5 CREAM (GRAM) TOPICAL AT BEDTIME
Refills: 0 | Status: DISCONTINUED | OUTPATIENT
Start: 2024-05-13 | End: 2024-05-19

## 2024-05-13 RX ORDER — METHOCARBAMOL 500 MG/1
500 TABLET, FILM COATED ORAL EVERY 8 HOURS
Refills: 0 | Status: DISCONTINUED | OUTPATIENT
Start: 2024-05-13 | End: 2024-05-14

## 2024-05-13 RX ORDER — GLUCAGON INJECTION, SOLUTION 0.5 MG/.1ML
1 INJECTION, SOLUTION SUBCUTANEOUS ONCE
Refills: 0 | Status: DISCONTINUED | OUTPATIENT
Start: 2024-05-13 | End: 2024-05-19

## 2024-05-13 RX ORDER — POLYETHYLENE GLYCOL 3350 17 G/17G
17 POWDER, FOR SOLUTION ORAL AT BEDTIME
Refills: 0 | Status: DISCONTINUED | OUTPATIENT
Start: 2024-05-13 | End: 2024-05-19

## 2024-05-13 RX ORDER — OXYCODONE HYDROCHLORIDE 5 MG/1
5 TABLET ORAL EVERY 4 HOURS
Refills: 0 | Status: DISCONTINUED | OUTPATIENT
Start: 2024-05-13 | End: 2024-05-13

## 2024-05-13 RX ORDER — ENOXAPARIN SODIUM 100 MG/ML
40 INJECTION SUBCUTANEOUS EVERY 24 HOURS
Refills: 0 | Status: DISCONTINUED | OUTPATIENT
Start: 2024-05-14 | End: 2024-05-19

## 2024-05-13 RX ORDER — TRAMADOL HYDROCHLORIDE 50 MG/1
50 TABLET ORAL EVERY 4 HOURS
Refills: 0 | Status: DISCONTINUED | OUTPATIENT
Start: 2024-05-13 | End: 2024-05-14

## 2024-05-13 RX ORDER — TRAMADOL HYDROCHLORIDE 50 MG/1
25 TABLET ORAL EVERY 4 HOURS
Refills: 0 | Status: DISCONTINUED | OUTPATIENT
Start: 2024-05-13 | End: 2024-05-14

## 2024-05-13 RX ORDER — ONDANSETRON 8 MG/1
4 TABLET, FILM COATED ORAL EVERY 6 HOURS
Refills: 0 | Status: DISCONTINUED | OUTPATIENT
Start: 2024-05-13 | End: 2024-05-15

## 2024-05-13 RX ORDER — ACETAMINOPHEN 500 MG
1000 TABLET ORAL EVERY 8 HOURS
Refills: 0 | Status: DISCONTINUED | OUTPATIENT
Start: 2024-05-13 | End: 2024-05-15

## 2024-05-13 RX ORDER — ACETAMINOPHEN 500 MG
1000 TABLET ORAL ONCE
Refills: 0 | Status: COMPLETED | OUTPATIENT
Start: 2024-05-13 | End: 2024-05-13

## 2024-05-13 RX ORDER — LISINOPRIL 2.5 MG/1
1 TABLET ORAL
Refills: 0 | DISCHARGE

## 2024-05-13 RX ORDER — DEXTROSE 50 % IN WATER 50 %
15 SYRINGE (ML) INTRAVENOUS ONCE
Refills: 0 | Status: DISCONTINUED | OUTPATIENT
Start: 2024-05-13 | End: 2024-05-19

## 2024-05-13 RX ORDER — HYDROMORPHONE HYDROCHLORIDE 2 MG/ML
0.5 INJECTION INTRAMUSCULAR; INTRAVENOUS; SUBCUTANEOUS EVERY 4 HOURS
Refills: 0 | Status: DISCONTINUED | OUTPATIENT
Start: 2024-05-13 | End: 2024-05-13

## 2024-05-13 RX ORDER — MORPHINE SULFATE 50 MG/1
2 CAPSULE, EXTENDED RELEASE ORAL EVERY 4 HOURS
Refills: 0 | Status: DISCONTINUED | OUTPATIENT
Start: 2024-05-13 | End: 2024-05-16

## 2024-05-13 RX ORDER — ATORVASTATIN CALCIUM 80 MG/1
40 TABLET, FILM COATED ORAL AT BEDTIME
Refills: 0 | Status: DISCONTINUED | OUTPATIENT
Start: 2024-05-13 | End: 2024-05-19

## 2024-05-13 RX ORDER — SUMATRIPTAN SUCCINATE 4 MG/.5ML
50 INJECTION, SOLUTION SUBCUTANEOUS DAILY
Refills: 0 | Status: DISCONTINUED | OUTPATIENT
Start: 2024-05-13 | End: 2024-05-19

## 2024-05-13 RX ORDER — GABAPENTIN 400 MG/1
1 CAPSULE ORAL
Refills: 0 | DISCHARGE

## 2024-05-13 RX ORDER — TRAMADOL HYDROCHLORIDE 50 MG/1
1 TABLET ORAL
Refills: 0 | DISCHARGE

## 2024-05-13 RX ORDER — OXYCODONE HYDROCHLORIDE 5 MG/1
10 TABLET ORAL EVERY 4 HOURS
Refills: 0 | Status: DISCONTINUED | OUTPATIENT
Start: 2024-05-13 | End: 2024-05-13

## 2024-05-13 RX ORDER — SENNA PLUS 8.6 MG/1
2 TABLET ORAL AT BEDTIME
Refills: 0 | Status: DISCONTINUED | OUTPATIENT
Start: 2024-05-13 | End: 2024-05-19

## 2024-05-13 RX ORDER — METFORMIN HYDROCHLORIDE 850 MG/1
1 TABLET ORAL
Refills: 0 | DISCHARGE

## 2024-05-13 RX ORDER — DEXTROSE 50 % IN WATER 50 %
12.5 SYRINGE (ML) INTRAVENOUS ONCE
Refills: 0 | Status: DISCONTINUED | OUTPATIENT
Start: 2024-05-13 | End: 2024-05-19

## 2024-05-13 RX ORDER — SODIUM CHLORIDE 9 MG/ML
1000 INJECTION, SOLUTION INTRAVENOUS
Refills: 0 | Status: DISCONTINUED | OUTPATIENT
Start: 2024-05-13 | End: 2024-05-15

## 2024-05-13 RX ORDER — MAGNESIUM HYDROXIDE 400 MG/1
30 TABLET, CHEWABLE ORAL EVERY 12 HOURS
Refills: 0 | Status: DISCONTINUED | OUTPATIENT
Start: 2024-05-13 | End: 2024-05-19

## 2024-05-13 RX ORDER — DIPHENHYDRAMINE HCL 50 MG
25 CAPSULE ORAL EVERY 6 HOURS
Refills: 0 | Status: DISCONTINUED | OUTPATIENT
Start: 2024-05-13 | End: 2024-05-19

## 2024-05-13 RX ORDER — AMITRIPTYLINE HCL 25 MG
1 TABLET ORAL
Refills: 0 | DISCHARGE

## 2024-05-13 RX ORDER — CHLORHEXIDINE GLUCONATE 213 G/1000ML
1 SOLUTION TOPICAL ONCE
Refills: 0 | Status: COMPLETED | OUTPATIENT
Start: 2024-05-13 | End: 2024-05-13

## 2024-05-13 RX ORDER — BENZOCAINE AND MENTHOL 5; 1 G/100ML; G/100ML
1 LIQUID ORAL
Refills: 0 | Status: DISCONTINUED | OUTPATIENT
Start: 2024-05-13 | End: 2024-05-19

## 2024-05-13 RX ORDER — PANTOPRAZOLE SODIUM 20 MG/1
40 TABLET, DELAYED RELEASE ORAL
Refills: 0 | Status: DISCONTINUED | OUTPATIENT
Start: 2024-05-13 | End: 2024-05-15

## 2024-05-13 RX ORDER — KETOROLAC TROMETHAMINE 30 MG/ML
30 SYRINGE (ML) INJECTION ONCE
Refills: 0 | Status: DISCONTINUED | OUTPATIENT
Start: 2024-05-13 | End: 2024-05-13

## 2024-05-13 RX ORDER — GABAPENTIN 400 MG/1
600 CAPSULE ORAL EVERY 8 HOURS
Refills: 0 | Status: DISCONTINUED | OUTPATIENT
Start: 2024-05-13 | End: 2024-05-19

## 2024-05-13 RX ORDER — DEXTROSE 10 % IN WATER 10 %
125 INTRAVENOUS SOLUTION INTRAVENOUS ONCE
Refills: 0 | Status: DISCONTINUED | OUTPATIENT
Start: 2024-05-13 | End: 2024-05-19

## 2024-05-13 RX ORDER — MELOXICAM 15 MG/1
1 TABLET ORAL
Refills: 0 | DISCHARGE

## 2024-05-13 RX ADMIN — HYDROMORPHONE HYDROCHLORIDE 0.5 MILLIGRAM(S): 2 INJECTION INTRAMUSCULAR; INTRAVENOUS; SUBCUTANEOUS at 12:56

## 2024-05-13 RX ADMIN — TRAMADOL HYDROCHLORIDE 50 MILLIGRAM(S): 50 TABLET ORAL at 21:12

## 2024-05-13 RX ADMIN — HYDROMORPHONE HYDROCHLORIDE 0.5 MILLIGRAM(S): 2 INJECTION INTRAMUSCULAR; INTRAVENOUS; SUBCUTANEOUS at 12:33

## 2024-05-13 RX ADMIN — Medication 1000 MILLIGRAM(S): at 06:56

## 2024-05-13 RX ADMIN — Medication 1000 MILLIGRAM(S): at 19:09

## 2024-05-13 RX ADMIN — ONDANSETRON 4 MILLIGRAM(S): 8 TABLET, FILM COATED ORAL at 12:34

## 2024-05-13 RX ADMIN — POLYETHYLENE GLYCOL 3350 17 GRAM(S): 17 POWDER, FOR SOLUTION ORAL at 21:10

## 2024-05-13 RX ADMIN — Medication 50 MILLIGRAM(S): at 22:42

## 2024-05-13 RX ADMIN — SENNA PLUS 2 TABLET(S): 8.6 TABLET ORAL at 21:02

## 2024-05-13 RX ADMIN — APREPITANT 40 MILLIGRAM(S): 80 CAPSULE ORAL at 06:55

## 2024-05-13 RX ADMIN — Medication 100 MILLIGRAM(S): at 16:27

## 2024-05-13 RX ADMIN — GABAPENTIN 600 MILLIGRAM(S): 400 CAPSULE ORAL at 13:11

## 2024-05-13 RX ADMIN — GABAPENTIN 600 MILLIGRAM(S): 400 CAPSULE ORAL at 21:03

## 2024-05-13 RX ADMIN — ATORVASTATIN CALCIUM 40 MILLIGRAM(S): 80 TABLET, FILM COATED ORAL at 21:09

## 2024-05-13 RX ADMIN — Medication 1000 MILLIGRAM(S): at 13:13

## 2024-05-13 RX ADMIN — Medication 1000 MILLIGRAM(S): at 13:39

## 2024-05-13 RX ADMIN — METHOCARBAMOL 500 MILLIGRAM(S): 500 TABLET, FILM COATED ORAL at 21:02

## 2024-05-13 RX ADMIN — Medication 1000 MILLIGRAM(S): at 21:02

## 2024-05-13 RX ADMIN — HYDROMORPHONE HYDROCHLORIDE 0.5 MILLIGRAM(S): 2 INJECTION INTRAMUSCULAR; INTRAVENOUS; SUBCUTANEOUS at 13:19

## 2024-05-13 RX ADMIN — Medication 1 APPLICATION(S): at 07:28

## 2024-05-13 RX ADMIN — HYDROMORPHONE HYDROCHLORIDE 0.5 MILLIGRAM(S): 2 INJECTION INTRAMUSCULAR; INTRAVENOUS; SUBCUTANEOUS at 12:29

## 2024-05-13 RX ADMIN — Medication 30 MILLIGRAM(S): at 18:18

## 2024-05-13 RX ADMIN — CHLORHEXIDINE GLUCONATE 1 APPLICATION(S): 213 SOLUTION TOPICAL at 06:55

## 2024-05-13 RX ADMIN — SODIUM CHLORIDE 100 MILLILITER(S): 9 INJECTION, SOLUTION INTRAVENOUS at 12:03

## 2024-05-13 RX ADMIN — METHOCARBAMOL 500 MILLIGRAM(S): 500 TABLET, FILM COATED ORAL at 13:11

## 2024-05-13 RX ADMIN — TRAMADOL HYDROCHLORIDE 50 MILLIGRAM(S): 50 TABLET ORAL at 20:12

## 2024-05-13 RX ADMIN — HYDROMORPHONE HYDROCHLORIDE 0.5 MILLIGRAM(S): 2 INJECTION INTRAMUSCULAR; INTRAVENOUS; SUBCUTANEOUS at 12:03

## 2024-05-13 RX ADMIN — Medication 400 MILLIGRAM(S): at 18:19

## 2024-05-13 NOTE — PRE-ANESTHESIA EVALUATION ADULT - NSANTHOSAYNRD_GEN_A_CORE
No. EMILIANA screening performed.  STOP BANG Legend: 0-2 = LOW Risk; 3-4 = INTERMEDIATE Risk; 5-8 = HIGH Risk

## 2024-05-13 NOTE — CHART NOTE - NSCHARTNOTEFT_GEN_A_CORE
***Rapid Response Clinical Impact Nurse Practitioner Note***    Patient is a 74y old Female with PMH of hypertension, diabetes, hyperlipidemia, Arthritis, gastroesophageal reflux disease, and osteoarthritis, admitted for elective laminectomy with posterolateral fusion L4-L5 with tlif, margo and reinstrumentation L4-S1 and POD0.    Rapid response team called at 1755 by primary RN for acute onset shaking of extremities without change in mental status.    Patient was seen and examined at the bedside by the rapid response team. Upon arrival, patient with gross shaking of bilateral arms and some movement of legs, but speaking with staff. Initial vital signs: T = 97.1; BP = 113/35; HR = 111; RR = 26; SPO2 90s, but with inconsistent pleth; glucose = 133. Patient states she is experiencing 10/10 pain; per chart review patient had received three doses of 0.5mg hydromorphone in PACU; last dose at approximately 1400. Given noted hydromorphone allergy, patient given 1g IV tylenol. Stat labs obtained: CBC, BMP, lactate, and VBG. Given shaking of upper extremities, SPO2 pleth and non-invasive blood pressure readings were intermittently errant. Shaking of upper extremities aborted with intentional movement (i.e. grasping of writers hands, closing eyes and focusing on staying still). Patient with ongoing pain, so was given 30mg IVP toradol. Additionally, patient appearing very anxious, verbalizing concern about her family and illness. With additional pain medication, shaking became very mild with absence of artifact on EKG. Hemodynamically stable throughout rapid response, final vital signs: BP = 105/82, HR = 94 (NSR); RR = 20; SPO2 = 96% on room air.    Allergies  oxycodone (Pruritus)  Intolerances:  Dilaudid (Pruritus)      PAST MEDICAL & SURGICAL HISTORY:  HTN (hypertension)  Diabetes  HLD (hyperlipidemia)  Colon polyp  Arthritis  GERD (gastroesophageal reflux disease)  Osteoarthritis  H/O urinary incontinence  H/O abdominal hysterectomy (30y ago)  History of appendectomy (2012)  History of back surgery (2011, lumbar)  H/O tubal ligation      Vital Signs Last 24 Hrs  T(C): 36.6 (13 May 2024 16:03), Max: 36.7 (13 May 2024 14:19)  T(F): 97.8 (13 May 2024 16:03), Max: 98.1 (13 May 2024 14:19)  HR: 87 (13 May 2024 17:41) (78 - 88)  BP: 135/59 (13 May 2024 17:41) (105/57 - 154/86)  BP(mean): 89 (13 May 2024 13:31) (74 - 97)  RR: 16 (13 May 2024 16:03) (9 - 16)  SpO2: 96% (13 May 2024 16:03) (89% - 100%)    Parameters below as of 13 May 2024 16:03  Patient On (Oxygen Delivery Method): room air      REVIEW OF SYSTEMS   HEENT:  Complains of headache.  Respiratory: Denies dyspnea  CV: Denies chest pain  MSK: Denies having musculoskeletal shaking like this in the past  Neurologic: Complains of dizziness    PHYSICAL EXAM  GENERAL: The patient is awake with gross shaking of upper extremities, in emotional distress.   HEENT: Head is normocephalic. Extraocular muscles are intact. PERRL. No facial asymmetry. Mucous membranes are slightly dry. No tongue swelling; able to visualize posterior oropharynx.  LUNGS: Clear to auscultation bilaterally. Tachypneic, but breathing is unlabored.  HEART: Tachycardic, regular rhythm/  ABDOMEN: Soft, nontender. Bowel sounds audible and normoactive in all four quadrants.  EXTREMITIES: Warm and well-perfused.  MSK: Upper extremity strength equal bilaterally, RLE weaker than LLE, but baseline per patient and not an acute change.  VASCULAR: Radial and dorsal pedal pulses palpable bilaterally  NEUROLOGIC: Grossly intact. Alert and oriented x4 and moves all extremities spontaneously.  PSYCH: Anxious and upset.    05-13 @ 07:01  -  05-13 @ 18:41  --------------------------------------------------------  IN: 200 mL / OUT: 495 mL / NET: -295 mL                        9.7    11.14 )-----------( 286      ( 13 May 2024 18:18 )             30.0           PT/INR - ( 13 May 2024 18:18 )   PT: 11.0 sec;   INR: 0.96     PTT - ( 13 May 2024 18:18 )  PTT:27.4 sec    MEDICATIONS  (STANDING):  acetaminophen     Tablet .. 1000 milliGRAM(s) Oral every 8 hours  amitriptyline 50 milliGRAM(s) Oral at bedtime  atorvastatin 40 milliGRAM(s) Oral at bedtime  ceFAZolin   IVPB 2000 milliGRAM(s) IV Intermittent every 8 hours  dextrose 10% Bolus 125 milliLiter(s) IV Bolus once  dextrose 5%. 1000 milliLiter(s) (100 mL/Hr) IV Continuous <Continuous>  dextrose 5%. 1000 milliLiter(s) (50 mL/Hr) IV Continuous <Continuous>  dextrose 50% Injectable 25 Gram(s) IV Push once  dextrose 50% Injectable 12.5 Gram(s) IV Push once  gabapentin 600 milliGRAM(s) Oral every 8 hours  glucagon  Injectable 1 milliGRAM(s) IntraMuscular once  insulin lispro (ADMELOG) corrective regimen sliding scale   SubCutaneous Before meals and at bedtime  lactated ringers. 1000 milliLiter(s) (100 mL/Hr) IV Continuous <Continuous>  lisinopril 20 milliGRAM(s) Oral daily  methocarbamol 500 milliGRAM(s) Oral every 8 hours  ondansetron   Disintegrating Tablet 4 milliGRAM(s) Oral every 6 hours  pantoprazole    Tablet 40 milliGRAM(s) Oral before breakfast  polyethylene glycol 3350 17 Gram(s) Oral at bedtime  senna 2 Tablet(s) Oral at bedtime    MEDICATIONS  (PRN):  aluminum hydroxide/magnesium hydroxide/simethicone Suspension 30 milliLiter(s) Oral every 12 hours PRN Indigestion  bisacodyl 5 milliGRAM(s) Oral every 12 hours PRN Constipation  dextrose Oral Gel 15 Gram(s) Oral once PRN Blood Glucose LESS THAN 70 milliGRAM(s)/deciliter  HYDROmorphone  Injectable 0.5 milliGRAM(s) IV Push every 4 hours PRN breakthrough  magnesium hydroxide Suspension 30 milliLiter(s) Oral every 12 hours PRN Constipation  SUMAtriptan 50 milliGRAM(s) Oral daily PRN Headache  traMADol 50 milliGRAM(s) Oral every 4 hours PRN Severe Pain (7 - 10)  traMADol 25 milliGRAM(s) Oral every 4 hours PRN Moderate Pain (4 - 6)      Assessment:  Rapid Response called for 74 year old Female with a past medical history of hypertension, diabetes, hyperlipidemia, arthritis, gastroesophageal reflux disease, and osteoarthritis, admitted for elective laminectomy with posterolateral fusion L4-L5 with tlif, margo and reinstrumentation L4-S1 and POD0.     Now s/p rapid response with DIAGNOSIS in the setting of ______. ?Differential    Recommendations:  - Pain management with standing and PRN medications  - Clarify safety of ongoing administration of hydromorphone given history of itching with administration; patient states this last happened many years ago  - Follow up stat labs: anemic with Hgb of 9.7, last CBC in [facility] from 2021.   - Lactate elevated, very likely due to ongoing shaking of extremities lasting >5 minutes; repeat when analgesia obtained and patient calm      Disposition:  - Remain on 8 [address]  - Plan discussed with ICU consult resident and primary team resident Dr Higgins.     I have personally and independently provided 31 minutes of critical care services.  This excludes any time spent on separate procedures or teaching.    ------  Shon Sauceda MS, [facility]  Clinical Impact Team Phone: [phone]  Vocera: “Clinical Impact ACP”, “CIACP”, “Shon Sauceda” INCOMPLETE    ***Rapid Response Clinical Impact Nurse Practitioner Note***    Patient is a 74y old Female with PMH of hypertension, diabetes, hyperlipidemia, Arthritis, gastroesophageal reflux disease, and osteoarthritis, admitted for elective laminectomy with posterolateral fusion L4-L5 with tlif, margo and reinstrumentation L4-S1 and POD0.    Rapid response team called at 1755 by primary RN for acute onset shaking of extremities without change in mental status.    Patient was seen and examined at the bedside by the rapid response team. Upon arrival, patient with gross shaking of bilateral arms and some movement of legs, but speaking with staff. Initial vital signs: T = 97.1; BP = 113/35; HR = 111; RR = 26; SPO2 90s, but with inconsistent pleth; glucose = 133. Patient states she is experiencing 10/10 pain; per chart review patient had received three doses of 0.5mg hydromorphone in PACU; last dose at approximately 1400. Given noted hydromorphone allergy, patient given 1g IV tylenol. Stat labs obtained: CBC, BMP, lactate, and VBG. Given shaking of upper extremities, SPO2 pleth and non-invasive blood pressure readings were intermittently errant. Shaking of upper extremities aborted with intentional movement (i.e. grasping of writers hands, closing eyes and focusing on staying still). Patient with ongoing pain, so was given 30mg IVP toradol. Additionally, patient appearing very anxious, verbalizing concern about her family and illness. With additional pain medication, shaking became very mild with absence of artifact on EKG. Hemodynamically stable throughout rapid response, final vital signs: BP = 105/82, HR = 94 (NSR); RR = 20; SPO2 = 96% on room air.    Allergies  oxycodone (Pruritus)  Intolerances:  Dilaudid (Pruritus)      PAST MEDICAL & SURGICAL HISTORY:  HTN (hypertension)  Diabetes  HLD (hyperlipidemia)  Colon polyp  Arthritis  GERD (gastroesophageal reflux disease)  Osteoarthritis  H/O urinary incontinence  H/O abdominal hysterectomy (30y ago)  History of appendectomy (2012)  History of back surgery (2011, lumbar)  H/O tubal ligation      Vital Signs Last 24 Hrs  T(C): 36.6 (13 May 2024 16:03), Max: 36.7 (13 May 2024 14:19)  T(F): 97.8 (13 May 2024 16:03), Max: 98.1 (13 May 2024 14:19)  HR: 87 (13 May 2024 17:41) (78 - 88)  BP: 135/59 (13 May 2024 17:41) (105/57 - 154/86)  BP(mean): 89 (13 May 2024 13:31) (74 - 97)  RR: 16 (13 May 2024 16:03) (9 - 16)  SpO2: 96% (13 May 2024 16:03) (89% - 100%)    Parameters below as of 13 May 2024 16:03  Patient On (Oxygen Delivery Method): room air      REVIEW OF SYSTEMS   HEENT:  Complains of headache.  Respiratory: Denies dyspnea  CV: Denies chest pain  MSK: Denies having musculoskeletal shaking like this in the past  Neurologic: Complains of dizziness    PHYSICAL EXAM  GENERAL: The patient is awake with gross shaking of upper extremities, in emotional distress.   HEENT: Head is normocephalic. Extraocular muscles are intact. PERRL. No facial asymmetry. Mucous membranes are slightly dry. No tongue swelling; able to visualize posterior oropharynx.  LUNGS: Clear to auscultation bilaterally. Tachypneic, but breathing is unlabored.  HEART: Tachycardic, regular rhythm/  ABDOMEN: Soft, nontender. Bowel sounds audible and normoactive in all four quadrants.  EXTREMITIES: Warm and well-perfused.  MSK: Upper extremity strength equal bilaterally, RLE weaker than LLE, but baseline per patient and not an acute change.  VASCULAR: Radial and dorsal pedal pulses palpable bilaterally  NEUROLOGIC: Grossly intact. Alert and oriented x4 and moves all extremities spontaneously.  PSYCH: Anxious and upset.    05-13 @ 07:01  -  05-13 @ 18:41  --------------------------------------------------------  IN: 200 mL / OUT: 495 mL / NET: -295 mL                        9.7    11.14 )-----------( 286      ( 13 May 2024 18:18 )             30.0           PT/INR - ( 13 May 2024 18:18 )   PT: 11.0 sec;   INR: 0.96     PTT - ( 13 May 2024 18:18 )  PTT:27.4 sec    MEDICATIONS  (STANDING):  acetaminophen     Tablet .. 1000 milliGRAM(s) Oral every 8 hours  amitriptyline 50 milliGRAM(s) Oral at bedtime  atorvastatin 40 milliGRAM(s) Oral at bedtime  ceFAZolin   IVPB 2000 milliGRAM(s) IV Intermittent every 8 hours  dextrose 10% Bolus 125 milliLiter(s) IV Bolus once  dextrose 5%. 1000 milliLiter(s) (100 mL/Hr) IV Continuous <Continuous>  dextrose 5%. 1000 milliLiter(s) (50 mL/Hr) IV Continuous <Continuous>  dextrose 50% Injectable 25 Gram(s) IV Push once  dextrose 50% Injectable 12.5 Gram(s) IV Push once  gabapentin 600 milliGRAM(s) Oral every 8 hours  glucagon  Injectable 1 milliGRAM(s) IntraMuscular once  insulin lispro (ADMELOG) corrective regimen sliding scale   SubCutaneous Before meals and at bedtime  lactated ringers. 1000 milliLiter(s) (100 mL/Hr) IV Continuous <Continuous>  lisinopril 20 milliGRAM(s) Oral daily  methocarbamol 500 milliGRAM(s) Oral every 8 hours  ondansetron   Disintegrating Tablet 4 milliGRAM(s) Oral every 6 hours  pantoprazole    Tablet 40 milliGRAM(s) Oral before breakfast  polyethylene glycol 3350 17 Gram(s) Oral at bedtime  senna 2 Tablet(s) Oral at bedtime    MEDICATIONS  (PRN):  aluminum hydroxide/magnesium hydroxide/simethicone Suspension 30 milliLiter(s) Oral every 12 hours PRN Indigestion  bisacodyl 5 milliGRAM(s) Oral every 12 hours PRN Constipation  dextrose Oral Gel 15 Gram(s) Oral once PRN Blood Glucose LESS THAN 70 milliGRAM(s)/deciliter  HYDROmorphone  Injectable 0.5 milliGRAM(s) IV Push every 4 hours PRN breakthrough  magnesium hydroxide Suspension 30 milliLiter(s) Oral every 12 hours PRN Constipation  SUMAtriptan 50 milliGRAM(s) Oral daily PRN Headache  traMADol 50 milliGRAM(s) Oral every 4 hours PRN Severe Pain (7 - 10)  traMADol 25 milliGRAM(s) Oral every 4 hours PRN Moderate Pain (4 - 6)      Assessment:  Rapid Response called for 74 year old Female with a past medical history of hypertension, diabetes, hyperlipidemia, arthritis, gastroesophageal reflux disease, and osteoarthritis, admitted for elective laminectomy with posterolateral fusion L4-L5 with tlif, margo and reinstrumentation L4-S1 and POD0.     Now s/p rapid response with DIAGNOSIS in the setting of ______. ?Differential    Recommendations:  - Pain management with standing and PRN medications  - Clarify safety of ongoing administration of hydromorphone given history of itching with administration; patient states this last happened many years ago  - Follow up stat labs: anemic with Hgb of 9.7, last CBC in [facility] from 2021.   - Lactate elevated, very likely due to ongoing shaking of extremities lasting >5 minutes; repeat when analgesia obtained and patient calm      Disposition:  - Remain on 8 [address]  - Plan discussed with ICU consult resident and primary team resident Dr Higgins.     I have personally and independently provided 31 minutes of critical care services.  This excludes any time spent on separate procedures or teaching.    ------  Shon Sauceda MS, AGACNP-BC  Clinical Impact Team Phone: [phone]  Vocera: “Clinical Impact ACP”, “CIACP”, “Shon Sauceda” ***Rapid Response Clinical Impact Nurse Practitioner Note***    Patient is a 74y old Female with PMH of hypertension, diabetes, hyperlipidemia, Arthritis, gastroesophageal reflux disease, and osteoarthritis, admitted for elective laminectomy with posterolateral fusion L4-L5 with tlif, margo and reinstrumentation L4-S1 and POD0.    Rapid response team called at 1755 by primary RN for acute onset shaking of extremities without change in mental status.    Patient was seen and examined at the bedside by the rapid response team. Upon arrival, patient with gross shaking of bilateral arms and some movement of legs, but speaking with staff. Initial vital signs: T = 97.1; BP = 113/35; HR = 111; RR = 26; SPO2 90s, but with inconsistent pleth; glucose = 133. Patient states she is experiencing 10/10 pain; per chart review patient had received three doses of 0.5mg hydromorphone in PACU; last dose at approximately 1400. Given noted hydromorphone allergy, patient given 1g IV tylenol. Stat labs obtained: CBC, BMP, lactate, and VBG. Given shaking of upper extremities, SPO2 pleth and non-invasive blood pressure readings were intermittently errant. Shaking of upper extremities aborted with intentional movement (i.e. grasping of writers hands, closing eyes and focusing on staying still). Patient with ongoing pain, so was given 30mg IVP toradol. Additionally, patient appearing very anxious, verbalizing concern about her family and illness. With additional pain medication, shaking became very mild with absence of artifact on EKG. Hemodynamically stable throughout rapid response, final vital signs: BP = 105/82, HR = 94 (NSR); RR = 20; SPO2 = 96% on room air.    Allergies  oxycodone (Pruritus)  Intolerances:  Dilaudid (Pruritus)      PAST MEDICAL & SURGICAL HISTORY:  HTN (hypertension)  Diabetes  HLD (hyperlipidemia)  Colon polyp  Arthritis  GERD (gastroesophageal reflux disease)  Osteoarthritis  H/O urinary incontinence  H/O abdominal hysterectomy (30y ago)  History of appendectomy (2012)  History of back surgery (2011, lumbar)  H/O tubal ligation      Vital Signs Last 24 Hrs  T(C): 36.6 (13 May 2024 16:03), Max: 36.7 (13 May 2024 14:19)  T(F): 97.8 (13 May 2024 16:03), Max: 98.1 (13 May 2024 14:19)  HR: 87 (13 May 2024 17:41) (78 - 88)  BP: 135/59 (13 May 2024 17:41) (105/57 - 154/86)  BP(mean): 89 (13 May 2024 13:31) (74 - 97)  RR: 16 (13 May 2024 16:03) (9 - 16)  SpO2: 96% (13 May 2024 16:03) (89% - 100%)    Parameters below as of 13 May 2024 16:03  Patient On (Oxygen Delivery Method): room air      REVIEW OF SYSTEMS   HEENT:  Complains of headache.  Respiratory: Denies dyspnea  CV: Denies chest pain  MSK: Denies having musculoskeletal shaking like this in the past  Neurologic: Complains of dizziness    PHYSICAL EXAM  GENERAL: The patient is awake with gross shaking of upper extremities, in emotional distress.   HEENT: Head is normocephalic. Extraocular muscles are intact. PERRL. No facial asymmetry. Mucous membranes are slightly dry. No tongue swelling; able to visualize posterior oropharynx.  LUNGS: Clear to auscultation bilaterally. Tachypneic, but breathing is unlabored.  HEART: Tachycardic, regular rhythm/  ABDOMEN: Soft, nontender. Bowel sounds audible and normoactive in all four quadrants.  EXTREMITIES: Warm and well-perfused.  MSK: Upper extremity strength equal bilaterally, RLE weaker than LLE, but baseline per patient and not an acute change.  VASCULAR: Radial and dorsal pedal pulses palpable bilaterally  NEUROLOGIC: Grossly intact. Alert and oriented x4 and moves all extremities spontaneously.  PSYCH: Anxious and upset.    05-13 @ 07:01  -  05-13 @ 18:41  --------------------------------------------------------  IN: 200 mL / OUT: 495 mL / NET: -295 mL                        9.7    11.14 )-----------( 286      ( 13 May 2024 18:18 )             30.0        PT/INR - ( 13 May 2024 18:18 )   PT: 11.0 sec;   INR: 0.96     PTT - ( 13 May 2024 18:18 )  PTT:27.4 sec    MEDICATIONS  (STANDING):  acetaminophen     Tablet .. 1000 milliGRAM(s) Oral every 8 hours  amitriptyline 50 milliGRAM(s) Oral at bedtime  atorvastatin 40 milliGRAM(s) Oral at bedtime  ceFAZolin   IVPB 2000 milliGRAM(s) IV Intermittent every 8 hours  dextrose 10% Bolus 125 milliLiter(s) IV Bolus once  dextrose 5%. 1000 milliLiter(s) (100 mL/Hr) IV Continuous <Continuous>  dextrose 5%. 1000 milliLiter(s) (50 mL/Hr) IV Continuous <Continuous>  dextrose 50% Injectable 25 Gram(s) IV Push once  dextrose 50% Injectable 12.5 Gram(s) IV Push once  gabapentin 600 milliGRAM(s) Oral every 8 hours  glucagon  Injectable 1 milliGRAM(s) IntraMuscular once  insulin lispro (ADMELOG) corrective regimen sliding scale   SubCutaneous Before meals and at bedtime  lactated ringers. 1000 milliLiter(s) (100 mL/Hr) IV Continuous <Continuous>  lisinopril 20 milliGRAM(s) Oral daily  methocarbamol 500 milliGRAM(s) Oral every 8 hours  ondansetron   Disintegrating Tablet 4 milliGRAM(s) Oral every 6 hours  pantoprazole    Tablet 40 milliGRAM(s) Oral before breakfast  polyethylene glycol 3350 17 Gram(s) Oral at bedtime  senna 2 Tablet(s) Oral at bedtime    MEDICATIONS  (PRN):  aluminum hydroxide/magnesium hydroxide/simethicone Suspension 30 milliLiter(s) Oral every 12 hours PRN Indigestion  bisacodyl 5 milliGRAM(s) Oral every 12 hours PRN Constipation  dextrose Oral Gel 15 Gram(s) Oral once PRN Blood Glucose LESS THAN 70 milliGRAM(s)/deciliter  HYDROmorphone  Injectable 0.5 milliGRAM(s) IV Push every 4 hours PRN breakthrough  magnesium hydroxide Suspension 30 milliLiter(s) Oral every 12 hours PRN Constipation  SUMAtriptan 50 milliGRAM(s) Oral daily PRN Headache  traMADol 50 milliGRAM(s) Oral every 4 hours PRN Severe Pain (7 - 10)  traMADol 25 milliGRAM(s) Oral every 4 hours PRN Moderate Pain (4 - 6)      Assessment:  Rapid Response called for 74 year old Female with a past medical history of hypertension, diabetes, hyperlipidemia, arthritis, gastroesophageal reflux disease, and osteoarthritis, admitted for elective laminectomy with posterolateral fusion L4-L5 with tlif, margo and reinstrumentation L4-S1 and POD0. Now s/p rapid response with generalized shaking likely in the setting of acute pain and anxiety. Does not appear to be neurologically mediated.    Recommendations:  - Pain management with standing and PRN medications  - Clarify safety of ongoing administration of hydromorphone given history of itching with administration; patient states this last happened many years ago  - S/P stat labs: anemic with Hgb of 9.7, last CBC in [facility] from 2021; BMP without acute abnormalities  - Lactate elevated to 5.1, very likely due to ongoing shaking of extremities lasting >5 minutes; repeat when analgesia obtained and patient calm  - If ongoing episodes, could consider VEEG    Disposition:  - Remain on 8 [address]  - Plan discussed with ICU consult resident and primary team resident Dr Higgins. Patient presented to Dr Nice by ICU consult resident.    I have personally and independently provided 31 minutes of critical care services.  This excludes any time spent on separate procedures or teaching.    ------  Shon Sauceda MS, [facility]  Clinical Impact Team Phone: [phone]  Vocera: “Clinical Impact ACP”, “CIACP”, “Shon Sauceda” ***Rapid Response Clinical Impact Nurse Practitioner Note***    Patient is a 74y old Female with PMH of hypertension, diabetes, hyperlipidemia, Arthritis, gastroesophageal reflux disease, and osteoarthritis, admitted for elective laminectomy with posterolateral fusion L4-L5 with tlif, margo and reinstrumentation L4-S1 and POD0.    Rapid response team called at 1755 by primary RN for acute onset shaking of extremities without change in mental status.    Patient was seen and examined at the bedside by the rapid response team. Upon arrival, patient with gross shaking of bilateral arms and some shaking of legs, but speaking with staff. Initial vital signs: T = 97.1; BP = 113/35; HR = 111; RR = 26; SPO2 90s, but with inconsistent pleth; glucose = 133. Patient states she is experiencing 10/10 pain; per chart review patient had received three doses of 0.5mg hydromorphone in PACU; last dose at approximately 1400. Given noted hydromorphone allergy, patient given 1g IV tylenol. Stat labs obtained: CBC, BMP, lactate, and VBG. Given shaking of upper extremities, SPO2 pleth and non-invasive blood pressure readings were intermittently errant. Shaking of upper extremities aborted with intentional movement (i.e. grasping of writers hands, closing eyes and focusing on staying still). Patient with ongoing pain, and was given 30mg IVP toradol. Additionally, patient appearing very anxious, verbalizing concern about her family and illness. With additional pain medication, shaking became very mild with absence of artifact on EKG. Hemodynamically stable throughout rapid response, final vital signs: BP = 105/82, HR = 94 (NSR); RR = 20; SPO2 = 96% on room air.    Allergies  oxycodone (Pruritus)  Intolerances:  Dilaudid (Pruritus)      PAST MEDICAL & SURGICAL HISTORY:  HTN (hypertension)  Diabetes  HLD (hyperlipidemia)  Colon polyp  Arthritis  GERD (gastroesophageal reflux disease)  Osteoarthritis  H/O urinary incontinence  H/O abdominal hysterectomy (30y ago)  History of appendectomy (2012)  History of back surgery (2011, lumbar)  H/O tubal ligation    Vital Signs Last 24 Hrs  T(C): 36.6 (13 May 2024 16:03), Max: 36.7 (13 May 2024 14:19)  T(F): 97.8 (13 May 2024 16:03), Max: 98.1 (13 May 2024 14:19)  HR: 87 (13 May 2024 17:41) (78 - 88)  BP: 135/59 (13 May 2024 17:41) (105/57 - 154/86)  BP(mean): 89 (13 May 2024 13:31) (74 - 97)  RR: 16 (13 May 2024 16:03) (9 - 16)  SpO2: 96% (13 May 2024 16:03) (89% - 100%)    Parameters below as of 13 May 2024 16:03  Patient On (Oxygen Delivery Method): room air    REVIEW OF SYSTEMS   HEENT:  Complains of headache.  Respiratory: Denies dyspnea  CV: Denies chest pain  MSK: Denies having musculoskeletal shaking like this in the past  Neurologic: Complains of dizziness    PHYSICAL EXAM  GENERAL: The patient is awake with gross shaking of upper extremities, in emotional distress.   HEENT: Head is normocephalic. Extraocular muscles are intact. PERRL. No facial asymmetry. Mucous membranes are slightly dry. No tongue swelling; able to visualize posterior oropharynx.  LUNGS: Clear to auscultation bilaterally. Tachypneic, but breathing is unlabored.  HEART: Tachycardic, regular rhythm.  ABDOMEN: Soft, nontender. Bowel sounds audible and normoactive in all four quadrants.  EXTREMITIES: Warm and well-perfused.  MSK: Upper extremity strength equal bilaterally, RLE weaker than LLE, but baseline per patient and not an acute change.  VASCULAR: Radial and dorsal pedal pulses palpable bilaterally  NEUROLOGIC: Grossly intact. Alert and oriented x4 and moves all extremities spontaneously.  PSYCH: Anxious and upset.    05-13 @ 07:01  -  05-13 @ 18:41  --------------------------------------------------------  IN: 200 mL / OUT: 495 mL / NET: -295 mL                        9.7    11.14 )-----------( 286      ( 13 May 2024 18:18 )             30.0        PT/INR - ( 13 May 2024 18:18 )   PT: 11.0 sec;   INR: 0.96     PTT - ( 13 May 2024 18:18 )  PTT:27.4 sec    MEDICATIONS  (STANDING):  acetaminophen     Tablet .. 1000 milliGRAM(s) Oral every 8 hours  amitriptyline 50 milliGRAM(s) Oral at bedtime  atorvastatin 40 milliGRAM(s) Oral at bedtime  ceFAZolin   IVPB 2000 milliGRAM(s) IV Intermittent every 8 hours  dextrose 10% Bolus 125 milliLiter(s) IV Bolus once  dextrose 5%. 1000 milliLiter(s) (100 mL/Hr) IV Continuous <Continuous>  dextrose 5%. 1000 milliLiter(s) (50 mL/Hr) IV Continuous <Continuous>  dextrose 50% Injectable 25 Gram(s) IV Push once  dextrose 50% Injectable 12.5 Gram(s) IV Push once  gabapentin 600 milliGRAM(s) Oral every 8 hours  glucagon  Injectable 1 milliGRAM(s) IntraMuscular once  insulin lispro (ADMELOG) corrective regimen sliding scale   SubCutaneous Before meals and at bedtime  lactated ringers. 1000 milliLiter(s) (100 mL/Hr) IV Continuous <Continuous>  lisinopril 20 milliGRAM(s) Oral daily  methocarbamol 500 milliGRAM(s) Oral every 8 hours  ondansetron   Disintegrating Tablet 4 milliGRAM(s) Oral every 6 hours  pantoprazole    Tablet 40 milliGRAM(s) Oral before breakfast  polyethylene glycol 3350 17 Gram(s) Oral at bedtime  senna 2 Tablet(s) Oral at bedtime    MEDICATIONS  (PRN):  aluminum hydroxide/magnesium hydroxide/simethicone Suspension 30 milliLiter(s) Oral every 12 hours PRN Indigestion  bisacodyl 5 milliGRAM(s) Oral every 12 hours PRN Constipation  dextrose Oral Gel 15 Gram(s) Oral once PRN Blood Glucose LESS THAN 70 milliGRAM(s)/deciliter  HYDROmorphone  Injectable 0.5 milliGRAM(s) IV Push every 4 hours PRN breakthrough  magnesium hydroxide Suspension 30 milliLiter(s) Oral every 12 hours PRN Constipation  SUMAtriptan 50 milliGRAM(s) Oral daily PRN Headache  traMADol 50 milliGRAM(s) Oral every 4 hours PRN Severe Pain (7 - 10)  traMADol 25 milliGRAM(s) Oral every 4 hours PRN Moderate Pain (4 - 6)      Assessment:  Rapid Response called for 74 year old Female with a past medical history of hypertension, diabetes, hyperlipidemia, arthritis, gastroesophageal reflux disease, and osteoarthritis, admitted for elective laminectomy with posterolateral fusion L4-L5 with tlif, margo and reinstrumentation L4-S1 and POD0. Now s/p rapid response with generalized shaking likely in the setting of acute pain and anxiety. Does not appear to be neurologically mediated.    Recommendations:  - Pain management with standing and PRN medications  - Clarify safety of ongoing administration of hydromorphone given history of itching with administration; patient states this last happened many years ago  - S/P stat labs: anemic with Hgb of 9.7, last CBC in [facility] from 2021; BMP without acute abnormalities  - Lactate elevated to 5.1, very likely due to ongoing shaking of extremities lasting >5 minutes; repeat when analgesia obtained and patient calm  - If ongoing episodes of extremities not controlled with analgesia, could consider VEEG    Disposition:  - Remain on 8 [address]  - Plan discussed with ICU consult resident and primary team resident Dr Higgins. Patient presented to Dr Nice by ICU consult resident.    I have personally and independently provided 31 minutes of critical care services.  This excludes any time spent on separate procedures or teaching.    ------  Shon Sauceda MS, [facility]  Clinical Impact Team Phone: [phone]  Vocera: “Clinical Impact ACP”, “CIASOL”, “Shon Sauceda”

## 2024-05-13 NOTE — PROGRESS NOTE ADULT - SUBJECTIVE AND OBJECTIVE BOX
Orthopedics Post Op Check    Procedure: L4-L5 TLIF, L4-S1 PSF   Surgeon: Dr Evangelista     Pt comfortable, without complaints. Pain moderately controlled in PACU with medication. Denies CP, SOB, N/V, numbness/tingling     Vital Signs Last 24 Hrs  T(C): 36.4 (13 May 2024 12:58), Max: 36.4 (13 May 2024 07:35)  T(F): 97.6 (13 May 2024 12:58), Max: 97.6 (13 May 2024 11:45)  HR: 84 (13 May 2024 13:31) (78 - 84)  BP: 135/62 (13 May 2024 13:31) (105/57 - 154/86)  BP(mean): 89 (13 May 2024 13:31) (74 - 97)  RR: 12 (13 May 2024 13:31) (9 - 16)  SpO2: 98% (13 May 2024 13:31) (97% - 100%)    Parameters below as of 13 May 2024 13:31  Patient On (Oxygen Delivery Method): nasal cannula  O2 Flow (L/min): 2    AVSS, NAD  General: Pt oriented, comfortable, drowsy but able to follow commands and answer questions appropriately   Dressing C/D/I- gauze/paper tape, HV x2 in place   Sensation intact and equal BLE   Motor ehl/ta/gs/fhl 5/5 BLE, calves soft without TTP bilaterally   Pulses dp palpable BLE     A/P: 74yFemale POD#0 s/p L4-L5 TLIF, L4-S1 PSF with Dr Evangelista   - Stable, monitor labs/vs/drain output   - Pain Control with ERAS   - DVT ppx: SCDs, LVX to start tomorrow evening per plan   - Carito-op abx: Ancef   - PT, WBS: WBAT   - F/U AM Labs

## 2024-05-13 NOTE — CHART NOTE - NSCHARTNOTEFT_GEN_A_CORE
Rapid called for tremulousness. Patient has been having sustained tremors postoperatively. Assessed by rapid team and vitals and patient were found to be stable. Will continue to monitor and f/u labs

## 2024-05-14 ENCOUNTER — TRANSCRIPTION ENCOUNTER (OUTPATIENT)
Age: 74
End: 2024-05-14

## 2024-05-14 LAB
A1C WITH ESTIMATED AVERAGE GLUCOSE RESULT: 7.3 % — HIGH (ref 4–5.6)
ANION GAP SERPL CALC-SCNC: 9 MMOL/L — SIGNIFICANT CHANGE UP (ref 5–17)
APPEARANCE UR: CLEAR — SIGNIFICANT CHANGE UP
BACTERIA # UR AUTO: NEGATIVE /HPF — SIGNIFICANT CHANGE UP
BASOPHILS # BLD AUTO: 0.01 K/UL — SIGNIFICANT CHANGE UP (ref 0–0.2)
BASOPHILS NFR BLD AUTO: 0.1 % — SIGNIFICANT CHANGE UP (ref 0–2)
BILIRUB UR-MCNC: NEGATIVE — SIGNIFICANT CHANGE UP
BUN SERPL-MCNC: 10 MG/DL — SIGNIFICANT CHANGE UP (ref 7–23)
CALCIUM SERPL-MCNC: 9.2 MG/DL — SIGNIFICANT CHANGE UP (ref 8.4–10.5)
CAST: 2 /LPF — SIGNIFICANT CHANGE UP (ref 0–4)
CHLORIDE SERPL-SCNC: 103 MMOL/L — SIGNIFICANT CHANGE UP (ref 96–108)
CK SERPL-CCNC: 894 U/L — HIGH (ref 25–170)
CO2 SERPL-SCNC: 24 MMOL/L — SIGNIFICANT CHANGE UP (ref 22–31)
COLOR SPEC: YELLOW — SIGNIFICANT CHANGE UP
CREAT SERPL-MCNC: 0.76 MG/DL — SIGNIFICANT CHANGE UP (ref 0.5–1.3)
DIFF PNL FLD: NEGATIVE — SIGNIFICANT CHANGE UP
EGFR: 82 ML/MIN/1.73M2 — SIGNIFICANT CHANGE UP
EOSINOPHIL # BLD AUTO: 0.03 K/UL — SIGNIFICANT CHANGE UP (ref 0–0.5)
EOSINOPHIL NFR BLD AUTO: 0.4 % — SIGNIFICANT CHANGE UP (ref 0–6)
ESTIMATED AVERAGE GLUCOSE: 163 MG/DL — HIGH (ref 68–114)
GLUCOSE BLDC GLUCOMTR-MCNC: 142 MG/DL — HIGH (ref 70–99)
GLUCOSE BLDC GLUCOMTR-MCNC: 200 MG/DL — HIGH (ref 70–99)
GLUCOSE BLDC GLUCOMTR-MCNC: 213 MG/DL — HIGH (ref 70–99)
GLUCOSE BLDC GLUCOMTR-MCNC: 235 MG/DL — HIGH (ref 70–99)
GLUCOSE SERPL-MCNC: 177 MG/DL — HIGH (ref 70–99)
GLUCOSE UR QL: 250 MG/DL
HCT VFR BLD CALC: 27.4 % — LOW (ref 34.5–45)
HGB BLD-MCNC: 8.6 G/DL — LOW (ref 11.5–15.5)
IMM GRANULOCYTES NFR BLD AUTO: 0.3 % — SIGNIFICANT CHANGE UP (ref 0–0.9)
KETONES UR-MCNC: NEGATIVE MG/DL — SIGNIFICANT CHANGE UP
LACTATE SERPL-SCNC: 2.2 MMOL/L — HIGH (ref 0.5–2)
LDH SERPL L TO P-CCNC: 176 U/L — SIGNIFICANT CHANGE UP (ref 50–242)
LEUKOCYTE ESTERASE UR-ACNC: ABNORMAL
LYMPHOCYTES # BLD AUTO: 1.17 K/UL — SIGNIFICANT CHANGE UP (ref 1–3.3)
LYMPHOCYTES # BLD AUTO: 17.4 % — SIGNIFICANT CHANGE UP (ref 13–44)
MCHC RBC-ENTMCNC: 31.2 PG — SIGNIFICANT CHANGE UP (ref 27–34)
MCHC RBC-ENTMCNC: 31.4 GM/DL — LOW (ref 32–36)
MCV RBC AUTO: 99.3 FL — SIGNIFICANT CHANGE UP (ref 80–100)
MONOCYTES # BLD AUTO: 0.49 K/UL — SIGNIFICANT CHANGE UP (ref 0–0.9)
MONOCYTES NFR BLD AUTO: 7.3 % — SIGNIFICANT CHANGE UP (ref 2–14)
NEUTROPHILS # BLD AUTO: 5 K/UL — SIGNIFICANT CHANGE UP (ref 1.8–7.4)
NEUTROPHILS NFR BLD AUTO: 74.5 % — SIGNIFICANT CHANGE UP (ref 43–77)
NITRITE UR-MCNC: NEGATIVE — SIGNIFICANT CHANGE UP
NRBC # BLD: 0 /100 WBCS — SIGNIFICANT CHANGE UP (ref 0–0)
PH UR: 7 — SIGNIFICANT CHANGE UP (ref 5–8)
PLATELET # BLD AUTO: 228 K/UL — SIGNIFICANT CHANGE UP (ref 150–400)
POTASSIUM SERPL-MCNC: 4.6 MMOL/L — SIGNIFICANT CHANGE UP (ref 3.5–5.3)
POTASSIUM SERPL-SCNC: 4.6 MMOL/L — SIGNIFICANT CHANGE UP (ref 3.5–5.3)
PROT UR-MCNC: NEGATIVE MG/DL — SIGNIFICANT CHANGE UP
RBC # BLD: 2.76 M/UL — LOW (ref 3.8–5.2)
RBC # FLD: 14.1 % — SIGNIFICANT CHANGE UP (ref 10.3–14.5)
RBC CASTS # UR COMP ASSIST: 0 /HPF — SIGNIFICANT CHANGE UP (ref 0–4)
SODIUM SERPL-SCNC: 136 MMOL/L — SIGNIFICANT CHANGE UP (ref 135–145)
SP GR SPEC: 1.01 — SIGNIFICANT CHANGE UP (ref 1–1.03)
UROBILINOGEN FLD QL: 0.2 MG/DL — SIGNIFICANT CHANGE UP (ref 0.2–1)
WBC # BLD: 6.72 K/UL — SIGNIFICANT CHANGE UP (ref 3.8–10.5)
WBC # FLD AUTO: 6.72 K/UL — SIGNIFICANT CHANGE UP (ref 3.8–10.5)
WBC UR QL: 2 /HPF — SIGNIFICANT CHANGE UP (ref 0–5)

## 2024-05-14 PROCEDURE — 99223 1ST HOSP IP/OBS HIGH 75: CPT | Mod: GC

## 2024-05-14 PROCEDURE — 71045 X-RAY EXAM CHEST 1 VIEW: CPT | Mod: 26

## 2024-05-14 RX ORDER — SODIUM CHLORIDE 9 MG/ML
1000 INJECTION INTRAMUSCULAR; INTRAVENOUS; SUBCUTANEOUS ONCE
Refills: 0 | Status: COMPLETED | OUTPATIENT
Start: 2024-05-14 | End: 2024-05-14

## 2024-05-14 RX ORDER — HYDROMORPHONE HYDROCHLORIDE 2 MG/ML
2 INJECTION INTRAMUSCULAR; INTRAVENOUS; SUBCUTANEOUS EVERY 4 HOURS
Refills: 0 | Status: DISCONTINUED | OUTPATIENT
Start: 2024-05-14 | End: 2024-05-19

## 2024-05-14 RX ADMIN — GABAPENTIN 600 MILLIGRAM(S): 400 CAPSULE ORAL at 21:29

## 2024-05-14 RX ADMIN — Medication 1000 MILLIGRAM(S): at 15:07

## 2024-05-14 RX ADMIN — Medication 1000 MILLIGRAM(S): at 05:37

## 2024-05-14 RX ADMIN — HYDROMORPHONE HYDROCHLORIDE 2 MILLIGRAM(S): 2 INJECTION INTRAMUSCULAR; INTRAVENOUS; SUBCUTANEOUS at 23:51

## 2024-05-14 RX ADMIN — ONDANSETRON 4 MILLIGRAM(S): 8 TABLET, FILM COATED ORAL at 05:37

## 2024-05-14 RX ADMIN — Medication 2: at 21:56

## 2024-05-14 RX ADMIN — Medication 1000 MILLIGRAM(S): at 22:29

## 2024-05-14 RX ADMIN — SENNA PLUS 2 TABLET(S): 8.6 TABLET ORAL at 21:30

## 2024-05-14 RX ADMIN — TRAMADOL HYDROCHLORIDE 50 MILLIGRAM(S): 50 TABLET ORAL at 13:00

## 2024-05-14 RX ADMIN — METHOCARBAMOL 500 MILLIGRAM(S): 500 TABLET, FILM COATED ORAL at 05:37

## 2024-05-14 RX ADMIN — Medication 100 MILLIGRAM(S): at 00:26

## 2024-05-14 RX ADMIN — GABAPENTIN 600 MILLIGRAM(S): 400 CAPSULE ORAL at 15:07

## 2024-05-14 RX ADMIN — ATORVASTATIN CALCIUM 40 MILLIGRAM(S): 80 TABLET, FILM COATED ORAL at 21:30

## 2024-05-14 RX ADMIN — SODIUM CHLORIDE 1000 MILLILITER(S): 9 INJECTION INTRAMUSCULAR; INTRAVENOUS; SUBCUTANEOUS at 17:00

## 2024-05-14 RX ADMIN — BENZOCAINE AND MENTHOL 1 LOZENGE: 5; 1 LIQUID ORAL at 07:47

## 2024-05-14 RX ADMIN — Medication 1000 MILLIGRAM(S): at 21:29

## 2024-05-14 RX ADMIN — ENOXAPARIN SODIUM 40 MILLIGRAM(S): 100 INJECTION SUBCUTANEOUS at 21:30

## 2024-05-14 RX ADMIN — TRAMADOL HYDROCHLORIDE 50 MILLIGRAM(S): 50 TABLET ORAL at 00:14

## 2024-05-14 RX ADMIN — TRAMADOL HYDROCHLORIDE 50 MILLIGRAM(S): 50 TABLET ORAL at 01:14

## 2024-05-14 RX ADMIN — PANTOPRAZOLE SODIUM 40 MILLIGRAM(S): 20 TABLET, DELAYED RELEASE ORAL at 05:37

## 2024-05-14 RX ADMIN — Medication 4: at 18:22

## 2024-05-14 RX ADMIN — GABAPENTIN 600 MILLIGRAM(S): 400 CAPSULE ORAL at 05:37

## 2024-05-14 RX ADMIN — POLYETHYLENE GLYCOL 3350 17 GRAM(S): 17 POWDER, FOR SOLUTION ORAL at 21:30

## 2024-05-14 RX ADMIN — TRAMADOL HYDROCHLORIDE 50 MILLIGRAM(S): 50 TABLET ORAL at 12:00

## 2024-05-14 RX ADMIN — Medication 4: at 12:04

## 2024-05-14 NOTE — PROGRESS NOTE ADULT - SUBJECTIVE AND OBJECTIVE BOX
Ortho Note    Subjective:  Pt seen and examined today, patient reporting surgical site pain and left LLE "heaviness".   Denies CP, SOB, N/V, numbness/tingling   denies headache, tremors, blurry vision   Reviewed plan of care with patient at bedside    Vital Signs Last 24 Hrs  T(C): 37 (05-14-24 @ 08:38), Max: 37 (05-14-24 @ 08:38)  T(F): 98.6 (05-14-24 @ 08:38), Max: 98.6 (05-14-24 @ 08:38)  HR: 83 (05-14-24 @ 08:38) (76 - 83)  BP: 108/63 (05-14-24 @ 08:38) (108/63 - 111/49)  BP(mean): --  RR: 16 (05-14-24 @ 08:38) (15 - 16)  SpO2: 94% (05-14-24 @ 08:38) (94% - 98%)  AVSS    Objective:    Physical Exam:  General: Pt Alert and oriented, NAD  Lumbar guaze and paper tape DSG C/D/I  Pulses: +2 pedal pulses, wwp toes   Sensation: silt intact bilateral lower extremities- reporting left thigh heaviness  Motor: EHL/FHL/TA/GS- firing bilateral lower extremities         Plan of Care:  A/P: 74yFemale POD#1 s/p L4-L5 TLIF, L4-S1 PSF,   - afebrile, wbcs 6.72  - Pain Control- tylenol 1000mg Po Q8h, gabapentin 600mg PO Q8h, methocarbamol 500mg PO Q8h, tramadol 25-50mg PO Q4h prn moderate to severe pain   - DVT ppx: scds, started lovenox 40mg SUbq Q24h   - PT, WBS: WBAT  - appreciate medicine recs  - bowel regimen, IS use, PPI , sliding scale FS  - continue Hv x2  - Dispo- pending pt eval, medical clearance, dc of drains     Ortho Pager [phone] Ortho Note    Subjective:  Pt seen and examined today, patient reporting surgical site pain and left LLE "heaviness".   Denies CP, SOB, N/V, numbness/tingling   denies headache, tremors, blurry vision   Reviewed plan of care with patient at bedside    Vital Signs Last 24 Hrs  T(C): 37 (05-14-24 @ 08:38), Max: 37 (05-14-24 @ 08:38)  T(F): 98.6 (05-14-24 @ 08:38), Max: 98.6 (05-14-24 @ 08:38)  HR: 83 (05-14-24 @ 08:38) (76 - 83)  BP: 108/63 (05-14-24 @ 08:38) (108/63 - 111/49)  BP(mean): --  RR: 16 (05-14-24 @ 08:38) (15 - 16)  SpO2: 94% (05-14-24 @ 08:38) (94% - 98%)  AVSS    Objective:    Physical Exam:  General: Pt Alert and oriented, NAD  Lumbar guaze and paper tape DSG C/D/I  Pulses: +2 pedal pulses, wwp toes   Sensation: silt intact bilateral lower extremities- reporting left thigh heaviness  Motor: EHL/FHL/TA/GS- firing bilateral lower extremities         Plan of Care:  A/P: 74yFemale POD#1 s/p L4-L5 TLIF, L4-S1 PSF,   - afebrile, wbcs 6.72  - Pain Control- tylenol 1000mg Po Q8h, gabapentin 600mg PO Q8h, methocarbamol 500mg PO Q8h, tramadol 25-50mg PO Q4h prn moderate to severe pain   - DVT ppx: scds, started lovenox 40mg SUbq Q24h   - PT, WBS: WBAT  - appreciate medicine recs  - bowel regimen, IS use, PPI , sliding scale FS, holding home lisinopril trending bps   - continue Hv x2  - Dispo- pending pt eval, medical clearance, dc of drains     Ortho Pager [phone] Ortho Note    Subjective:  Pt seen and examined today, patient reporting surgical site pain and left LLE "heaviness".   Denies CP, SOB, N/V, numbness/tingling   denies headache, tremors, blurry vision   Reviewed plan of care with patient at bedside    Vital Signs Last 24 Hrs  T(C): 37 (05-14-24 @ 08:38), Max: 37 (05-14-24 @ 08:38)  T(F): 98.6 (05-14-24 @ 08:38), Max: 98.6 (05-14-24 @ 08:38)  HR: 83 (05-14-24 @ 08:38) (76 - 83)  BP: 108/63 (05-14-24 @ 08:38) (108/63 - 111/49)  BP(mean): --  RR: 16 (05-14-24 @ 08:38) (15 - 16)  SpO2: 94% (05-14-24 @ 08:38) (94% - 98%)  AVSS    Objective:    Physical Exam:  General: Pt Alert and oriented, NAD  Lumbar guaze and paper tape DSG C/D/I  Pulses: +2 pedal pulses, wwp toes   Sensation: silt intact bilateral lower extremities- reporting left thigh heaviness  Motor: EHL/FHL/TA/GS- firing bilateral lower extremities         Plan of Care:  A/P: 74yFemale POD#1 s/p L4-L5 TLIF, L4-S1 PSF,   - afebrile, wbcs 6.72  - Pain Control- tylenol 1000mg Po Q8h, gabapentin 600mg PO Q8h, methocarbamol 500mg PO Q8h, tramadol 25-50mg PO Q4h prn moderate to severe pain   - DVT ppx: scds, started lovenox 40mg SUbq Q24h   - PT, WBS: WBAT  - appreciate medicine recs  - bowel regimen, IS use, PPI , sliding scale FS, holding home lisinopril trending bps   - continue Hv x2  - Dispo- pending pt eval, medical clearance, dc of drains     Ortho Pager [phone]    1230: provider notified patient is reporting blurry vision and fine essential tremors  evalauted patient v/s stable, afebrile, fast exam negative, cranial nerves intact 1-12.     symptoms improving without intervention     UA ordered, chest xray,   dcd amitryptilline, methocarbamol and tramadol   f/u CPK and lactate at 2pm  Ortho Note    Subjective:  Pt seen and examined today, patient reporting surgical site pain and left LLE "heaviness".   Denies CP, SOB, N/V, numbness/tingling   denies headache, tremors, blurry vision   Reviewed plan of care with patient at bedside    Vital Signs Last 24 Hrs  T(C): 37 (05-14-24 @ 08:38), Max: 37 (05-14-24 @ 08:38)  T(F): 98.6 (05-14-24 @ 08:38), Max: 98.6 (05-14-24 @ 08:38)  HR: 83 (05-14-24 @ 08:38) (76 - 83)  BP: 108/63 (05-14-24 @ 08:38) (108/63 - 111/49)  BP(mean): --  RR: 16 (05-14-24 @ 08:38) (15 - 16)  SpO2: 94% (05-14-24 @ 08:38) (94% - 98%)  AVSS    Objective:    Physical Exam:  General: Pt Alert and oriented, NAD  Lumbar guaze and paper tape DSG C/D/I  Pulses: +2 pedal pulses, wwp toes   Sensation: silt intact bilateral lower extremities- reporting left thigh heaviness  Motor: EHL/FHL/TA/GS- firing bilateral lower extremities         Plan of Care:  A/P: 74yFemale POD#1 s/p L4-L5 TLIF, L4-S1 PSF,   - afebrile, wbcs 6.72  - Pain Control- tylenol 1000mg Po Q8h, gabapentin 600mg PO Q8h, methocarbamol 500mg PO Q8h, tramadol 25-50mg PO Q4h prn moderate to severe pain   - DVT ppx: scds, started lovenox 40mg SUbq Q24h   - PT, WBS: WBAT  - appreciate medicine recs  - bowel regimen, IS use, PPI , sliding scale FS, holding home lisinopril trending bps   - continue Hv x2  dc iglesias tov   - Dispo- pending pt eval, medical clearance, dc of drains     Ortho Pager [phone]    1230: provider notified patient is reporting blurry vision and fine essential tremors  evalauted patient v/s stable, afebrile, fast exam negative, cranial nerves intact 1-12.     symptoms improving without intervention     UA ordered, chest xray,   dcd amitryptilline, methocarbamol and tramadol   f/u CPK and lactate at 2pm

## 2024-05-14 NOTE — DISCHARGE NOTE PROVIDER - CARE PROVIDER_API CALL
Brittni Evangelista  Orthopaedic Surgery  [address]  [place] [address]  Phone: [phone]  Fax: [phone]  Follow Up Time: 2 weeks

## 2024-05-14 NOTE — CONSULT NOTE ADULT - SUBJECTIVE AND OBJECTIVE BOX
Patient is a 74y old  Female who presents with a chief complaint of low back pain (14 May 2024 14:26)      HPI:  75 y/o f c/o low back pain x 1 year. States back pain radiates down bilateral legs. States she has numbness to bilateral feet. Uses assistive walking device to help with ambulation Takes tramadol as needed for her pain without relief. Failed conservative therapies for her symptoms. Denies history of blood clot.     Admitted for an  elective LAMINECTOMY WITH POSTEROLATERAL FUSION L4-L5 with TLIF, RANJITH and REINSTRUMENTATION L4-S1.       Post operatively patient had tremors , during which she was awake and conversive     Her medications include Escitalopram , Amitriptyline 50mg  , Tramadol 50mg q6h , Gabapentin 900mg TID , Lisinopril 20mg , pantroprazole 40 , Glimepiride 1mg po daily , Metformin 1000mg po bid , meloxicam , Sumatriptan , Atorvastatin     She currently reports her pain is better controlled , however reports itching with oxycodone and hydromorphone in the past    She denies cough , shortness of breath , chest discomfort , dysuria , suprapubic pain , abd pain , nausea , vomiting , diarrhea       PAST MEDICAL & SURGICAL HISTORY:  HTN (hypertension)      Diabetes      HLD (hyperlipidemia)      Colon polyp  SURGERY      Arthritis      GERD (gastroesophageal reflux disease)      Osteoarthritis      H/O urinary incontinence      H/O abdominal hysterectomy  30y ago      History of appendectomy  2012      History of back surgery  2011, lumbar      H/O tubal ligation            Allergies    oxycodone (Pruritus)    Intolerances    Dilaudid (Pruritus)      FAMILY HISTORY:      Social History:  per chart review never smoker (03 May 2024 10:21)      Home Medications:  Elavil 50 mg oral tablet: 1 tab(s) orally once a day (at bedtime) (13 May 2024 06:50)  gabapentin 600 mg oral tablet: 1 tab(s) orally 3 times a day (13 May 2024 06:50)  glimepiride 1 mg oral tablet: 1 tab(s) orally once a day (13 May 2024 06:50)  Imitrex 50 mg oral tablet: 1 tab(s) orally as needed for PRN (13 May 2024 06:50)  Lipitor 40 mg oral tablet: 1 tab(s) orally once a day (13 May 2024 06:50)  lisinopril 20 mg oral tablet: 1 tab(s) orally once a day (13 May 2024 06:50)  meloxicam 15 mg oral tablet: 1 tab(s) orally once a day (13 May 2024 06:50)  metFORMIN 1000 mg oral tablet: 1 tab(s) orally 2 times a day (13 May 2024 06:50)  pantoprazole 40 mg oral delayed release tablet: 1 tab(s) orally once a day (13 May 2024 06:50)  traMADol 50 mg oral tablet: 1 tab(s) orally as needed for PRN (13 May 2024 06:50)          CURRENT  MEDICATIONS:   acetaminophen     Tablet .. 1000 milliGRAM(s) Oral every 8 hours  aluminum hydroxide/magnesium hydroxide/simethicone Suspension 30 milliLiter(s) Oral every 12 hours PRN  atorvastatin 40 milliGRAM(s) Oral at bedtime  benzocaine/menthol Lozenge 1 Lozenge Oral every 2 hours PRN  bisacodyl 5 milliGRAM(s) Oral every 12 hours PRN  dextrose 10% Bolus 125 milliLiter(s) IV Bolus once  dextrose 5%. 1000 milliLiter(s) IV Continuous <Continuous>  dextrose 5%. 1000 milliLiter(s) IV Continuous <Continuous>  dextrose 50% Injectable 25 Gram(s) IV Push once  dextrose 50% Injectable 12.5 Gram(s) IV Push once  dextrose Oral Gel 15 Gram(s) Oral once PRN  diphenhydrAMINE 25 milliGRAM(s) Oral every 6 hours PRN  enoxaparin Injectable 40 milliGRAM(s) SubCutaneous every 24 hours  gabapentin 600 milliGRAM(s) Oral every 8 hours  glucagon  Injectable 1 milliGRAM(s) IntraMuscular once  HYDROmorphone   Tablet 2 milliGRAM(s) Oral every 4 hours PRN  insulin lispro (ADMELOG) corrective regimen sliding scale   SubCutaneous Before meals and at bedtime  lactated ringers. 1000 milliLiter(s) IV Continuous <Continuous>  lisinopril 20 milliGRAM(s) Oral daily  magnesium hydroxide Suspension 30 milliLiter(s) Oral every 12 hours PRN  melatonin 5 milliGRAM(s) Oral at bedtime PRN  morphine  - Injectable 2 milliGRAM(s) IV Push every 4 hours PRN  ondansetron   Disintegrating Tablet 4 milliGRAM(s) Oral every 6 hours  pantoprazole    Tablet 40 milliGRAM(s) Oral before breakfast  polyethylene glycol 3350 17 Gram(s) Oral at bedtime  senna 2 Tablet(s) Oral at bedtime  sodium chloride 0.9% Bolus 1000 milliLiter(s) IV Bolus once  SUMAtriptan 50 milliGRAM(s) Oral daily PRN       Diet, Consistent Carbohydrate/No Snacks (24 @ 15:54) [Active]          VITAL SIGNS, INS/OUTS (last 24 hours):  Vital Signs Last 24 Hrs  T(C): 36.7 (14 May 2024 12:02), Max: 37.4 (13 May 2024 20:28)  T(F): 98.1 (14 May 2024 12:02), Max: 99.4 (13 May 2024 20:28)  HR: 96 (14 May 2024 12:02) (71 - 96)  BP: 138/61 (14 May 2024 12:02) (104/62 - 138/61)  BP(mean): --  RR: 20 (14 May 2024 12:02) (15 - 20)  SpO2: 95% (14 May 2024 12:02) (94% - 98%)    Parameters below as of 14 May 2024 12:02  Patient On (Oxygen Delivery Method): room air      I&O's Summary    13 May 2024 07:01  -  14 May 2024 07:00  --------------------------------------------------------  IN: 680 mL / OUT: 2435 mL / NET: -1755 mL        Physical Exam   GENERAL: NAD, lying in bed comfortably  EYES: EOMI, PERRLA, conjunctiva and sclera clear  ENT: Moist mucous membranes, no mucosal lesions, normal dentition   NECK: Supple, No JVD  CHEST/LUNG: Clear to auscultation bilaterally; No rales, rhonchi, wheezing, or rubs. Unlabored respirations  HEART: Regular rate and rhythm; No murmurs, rubs, or gallops  ABDOMEN: Bowel sounds present; Soft, Nontender, Nondistended. No hepatomegaly  EXTREMITIES:  2+ Peripheral Pulses,  No clubbing, cyanosis, or edema  NERVOUS SYSTEM:  Alert & Oriented X3, speech clear. No deficits   MSK: FROM all 4 extremities, full and equal strength  SKIN: No rashes or lesions    LABS:                        8.6    6.72  )-----------( 228      ( 14 May 2024 05:30 )             27.4     05    136  |  103  |  10  ----------------------------<  177<H>  4.6   |  24  |  0.76    Ca    9.2      14 May 2024 05:30    TPro  6.7  /  Alb  4.3  /  TBili  0.2  /  DBili  x   /  AST  31  /  ALT  18  /  AlkPhos  63  05-13    PT/INR - ( 13 May 2024 18:18 )   PT: 11.0 sec;   INR: 0.96          PTT - ( 13 May 2024 18:18 )  PTT:27.4 sec  Urinalysis Basic - ( 14 May 2024 13:00 )    Color: Yellow / Appearance: Clear / S.008 / pH: x  Gluc: x / Ketone: Negative mg/dL  / Bili: Negative / Urobili: 0.2 mg/dL   Blood: x / Protein: Negative mg/dL / Nitrite: Negative   Leuk Esterase: Trace / RBC: 0 /HPF / WBC 2 /HPF   Sq Epi: x / Non Sq Epi: x / Bacteria: Negative /HPF      CAPILLARY BLOOD GLUCOSE      POCT Blood Glucose.: 213 mg/dL (14 May 2024 11:58)  POCT Blood Glucose.: 142 mg/dL (14 May 2024 07:03)  POCT Blood Glucose.: 130 mg/dL (13 May 2024 22:06)  POCT Blood Glucose.: 133 mg/dL (13 May 2024 17:55)  POCT Blood Glucose.: 126 mg/dL (13 May 2024 17:19)      OTHER LABS:  CARDIAC MARKERS ( 14 May 2024 14:41 )  x     / x     / 894 U/L / x     / x            MICRODATA:      IMAGING:    EKG:

## 2024-05-14 NOTE — DISCHARGE NOTE PROVIDER - NSDCFUADDINST_GEN_ALL_CORE_FT
ACTIVITY:   - No extreme bending, extending, turning, twisting, or straining. No strenuous activity, heavy lifting, driving or returning to work until cleared by your surgeon.     DRESSING/SHOWERING:      (STAPLES/SUTURES/STERI-STRIPS)   -Change dressing daily until post-op day 5. Sponge bathe until post-op day 5 then may take full shower. Once dressing removed keep incision clean and dry. Do not pick at your incision. Do not apply creams, ointments or oils to your incision until cleared by your surgeon. Do not soak your incision in sitting water (ie tubs, pools, lakes, etc.) until cleared by your surgeon.      MEDICATION/ANTICOAGULATION:   - You have been prescribed medications for pain:     - Tylenol (Acetaminophen) for mild to moderate pain. Do not exceed 3,000mg daily.     - For more severe pain, you may continue to take the Tylenol with the addition of narcotic pain medication. Take this medication as prescribed. Do not take more than prescribed. Note that this medication may cause drowsiness or dizziness. Do not operate machinery. This medication may cause constipation.   - If you have been prescribed a muscle relaxer, take this medication as needed for muscle spasm. Follow instructions on bottle.   - Try to have regular bowel movements. Take stool softener or laxative if necessary. You may wish to take Miralax daily until you have regular bowel movements.    - Do not take antiinflammatories (Aleve, Advil, Naproxen, Ibuprofen, etc.) until cleared by your surgeon. Tylenol is not an anti-inflammatory and okay to take (see above).   - If you have a pain management physician, please follow-up with them postoperatively.    - If you experience any negative side effects of your medications, please call your surgeon's office to discuss.     FOLLOW UP:   - Call to schedule an appt with Dr. Evangelista for follow up.    - Please follow-up with your primary care physician or any other specialist you see postoperatively, if needed.    - Contact your doctor or go to the emergency room if you experience: fever greater than 101.5, chills, chest pain, difficulty breathing, redness or excessive drainage around the incision, other concerns.

## 2024-05-14 NOTE — PROGRESS NOTE ADULT - SUBJECTIVE AND OBJECTIVE BOX
Orthopedics Floor Note    Procedure: L4-L5 TLIF, L4-S1 PSF   Surgeon: Dr Evangelista     Rapid response called yesterday approx. 6pm due to tremors and anxiety. Dilaudid DC’ed due to intolerance, morphine ordered instead. This morning, at baseline. States pain is controlled and medication is working.    Vital Signs Last 24 Hrs  T(C): 36.9 (14 May 2024 05:25), Max: 37.4 (13 May 2024 20:28)  T(F): 98.4 (14 May 2024 05:25), Max: 99.4 (13 May 2024 20:28)  HR: 76 (14 May 2024 05:25) (71 - 88)  BP: 111/49 (14 May 2024 05:25) (104/62 - 154/86)  BP(mean): 89 (13 May 2024 13:31) (74 - 97)  RR: 15 (14 May 2024 05:25) (9 - 18)  SpO2: 98% (14 May 2024 05:25) (95% - 100%)    Parameters below as of 14 May 2024 05:25  Patient On (Oxygen Delivery Method): room air      AVSS, NAD  General: NAD  B/l LE:  Sensation: SILT L2-S1  Motor:   L2 (hip flexion)  R-unable to SLR, L-able to SLR  L3 (knee extension)  R-unable to SLR, L-able to SLR  L4 (ankle dorsiflexion)  5/5   L5 (long toe extension) 5/5   S1 (ankle plantar flexion) 5/5    A/P: 74yFemale s/p L4-L5 TLIF, L4-S1 PSF on 5/13 with Dr. Evangelista.  - Stable, monitor labs/vs/drain output   - Pain Control with ERAS   - DVT ppx: SCDs, LVX to start tomorrow evening per plan   - Carito-op abx: Ancef   - PT, WBS: WBAT   - F/U AM Labs

## 2024-05-14 NOTE — PROVIDER CONTACT NOTE (CRITICAL VALUE NOTIFICATION) - ACTION/TREATMENT ORDERED:
provider notified. per provider lactate downtrending from previous results. lactate ordered for 5:30 5/14

## 2024-05-14 NOTE — DISCHARGE NOTE PROVIDER - NSDCMRMEDTOKEN_GEN_ALL_CORE_FT
Elavil 50 mg oral tablet: 1 tab(s) orally once a day (at bedtime)  gabapentin 600 mg oral tablet: 1 tab(s) orally 3 times a day  glimepiride 1 mg oral tablet: 1 tab(s) orally once a day  Imitrex 50 mg oral tablet: 1 tab(s) orally as needed for PRN  Lipitor 40 mg oral tablet: 1 tab(s) orally once a day  lisinopril 20 mg oral tablet: 1 tab(s) orally once a day  meloxicam 15 mg oral tablet: 1 tab(s) orally once a day  metFORMIN 1000 mg oral tablet: 1 tab(s) orally 2 times a day  pantoprazole 40 mg oral delayed release tablet: 1 tab(s) orally once a day  traMADol 50 mg oral tablet: 1 tab(s) orally as needed for PRN   Elavil 50 mg oral tablet: 1 tab(s) orally once a day (at bedtime)  gabapentin 600 mg oral tablet: 1 tab(s) orally 3 times a day  glimepiride 1 mg oral tablet: 1 tab(s) orally once a day  Imitrex 50 mg oral tablet: 1 tab(s) orally every 8 hours as needed for PRN  Lipitor 40 mg oral tablet: 1 tab(s) orally once a day  lisinopril 20 mg oral tablet: 1 tab(s) orally once a day  meloxicam 15 mg oral tablet: 1 tab(s) orally once a day  metFORMIN 1000 mg oral tablet: 1 tab(s) orally 2 times a day  pantoprazole 40 mg oral delayed release tablet: 1 tab(s) orally once a day  traMADol 50 mg oral tablet: 1 tab(s) orally as needed for PRN

## 2024-05-14 NOTE — DISCHARGE NOTE PROVIDER - HOSPITAL COURSE
Admitted to Orthopedic service on 5/13   Surgery- L4-L5 TLIF, L4-S1 PSF   Carito-op Antibiotics- Ancef  Pain control  DVT prophylaxis  OOB/Physical Therapy  Rapid response called for tremulousness, labs were drawn   Admitted to Orthopedic service on 5/13   Surgery- L4-L5 TLIF, L4-S1 PSF   Carito-op Antibiotics- Ancef  Pain control  DVT prophylaxis  OOB/Physical Therapy  5-13- Rapid response called for tremulousness, labs were drawn  5-14- patient reports tremoring and blurry vision, resolved without intervention, v/s stable, neuro exam normal, lactate and CK ordered Admitted to Orthopedic service on 5/13   Surgery- L4-L5 TLIF, L4-S1 PSF   Carito-op Antibiotics- Ancef  Pain control  DVT prophylaxis  OOB/Physical Therapy  5-13- Rapid response called for tremulousness, labs were drawn  5-14- patient reports tremoring and blurry vision, resolved without intervention, v/s stable, neuro exam normal, lactate and CK ordered  5-15 Constipated and bloated, Gen surg consulted. NG tube placed. 1.3 L bile decompressed. CT PE negative, CT abdomen concerning for ileus vs partial SBO - continued NG tube decompression  5-17 NG tube d/lg by surgery. Cleared by Gen surg  5-19 Cleared PT

## 2024-05-14 NOTE — DISCHARGE NOTE PROVIDER - NSDCCPTREATMENT_GEN_ALL_CORE_FT
PRINCIPAL PROCEDURE  Procedure: Fusion, spine, lumbar, TLIF, 1-2 levels  Findings and Treatment: L4-L5, L4-S1 PSF

## 2024-05-15 LAB
ANION GAP SERPL CALC-SCNC: 12 MMOL/L — SIGNIFICANT CHANGE UP (ref 5–17)
ANION GAP SERPL CALC-SCNC: 9 MMOL/L — SIGNIFICANT CHANGE UP (ref 5–17)
BASOPHILS # BLD AUTO: 0.02 K/UL — SIGNIFICANT CHANGE UP (ref 0–0.2)
BASOPHILS NFR BLD AUTO: 0.2 % — SIGNIFICANT CHANGE UP (ref 0–2)
BUN SERPL-MCNC: 7 MG/DL — SIGNIFICANT CHANGE UP (ref 7–23)
BUN SERPL-MCNC: 8 MG/DL — SIGNIFICANT CHANGE UP (ref 7–23)
CALCIUM SERPL-MCNC: 9.9 MG/DL — SIGNIFICANT CHANGE UP (ref 8.4–10.5)
CALCIUM SERPL-MCNC: 9.9 MG/DL — SIGNIFICANT CHANGE UP (ref 8.4–10.5)
CHLORIDE SERPL-SCNC: 100 MMOL/L — SIGNIFICANT CHANGE UP (ref 96–108)
CHLORIDE SERPL-SCNC: 101 MMOL/L — SIGNIFICANT CHANGE UP (ref 96–108)
CO2 SERPL-SCNC: 24 MMOL/L — SIGNIFICANT CHANGE UP (ref 22–31)
CO2 SERPL-SCNC: 24 MMOL/L — SIGNIFICANT CHANGE UP (ref 22–31)
CREAT SERPL-MCNC: 0.5 MG/DL — SIGNIFICANT CHANGE UP (ref 0.5–1.3)
CREAT SERPL-MCNC: 0.63 MG/DL — SIGNIFICANT CHANGE UP (ref 0.5–1.3)
EGFR: 92 ML/MIN/1.73M2 — SIGNIFICANT CHANGE UP
EGFR: 98 ML/MIN/1.73M2 — SIGNIFICANT CHANGE UP
EOSINOPHIL # BLD AUTO: 0.02 K/UL — SIGNIFICANT CHANGE UP (ref 0–0.5)
EOSINOPHIL NFR BLD AUTO: 0.2 % — SIGNIFICANT CHANGE UP (ref 0–6)
GLUCOSE BLDC GLUCOMTR-MCNC: 173 MG/DL — HIGH (ref 70–99)
GLUCOSE BLDC GLUCOMTR-MCNC: 203 MG/DL — HIGH (ref 70–99)
GLUCOSE BLDC GLUCOMTR-MCNC: 209 MG/DL — HIGH (ref 70–99)
GLUCOSE BLDC GLUCOMTR-MCNC: 233 MG/DL — HIGH (ref 70–99)
GLUCOSE SERPL-MCNC: 207 MG/DL — HIGH (ref 70–99)
GLUCOSE SERPL-MCNC: 226 MG/DL — HIGH (ref 70–99)
HCT VFR BLD CALC: 30.1 % — LOW (ref 34.5–45)
HCT VFR BLD CALC: 32.5 % — LOW (ref 34.5–45)
HGB BLD-MCNC: 10.7 G/DL — LOW (ref 11.5–15.5)
HGB BLD-MCNC: 9.9 G/DL — LOW (ref 11.5–15.5)
IMM GRANULOCYTES NFR BLD AUTO: 0.5 % — SIGNIFICANT CHANGE UP (ref 0–0.9)
LACTATE SERPL-SCNC: 1 MMOL/L — SIGNIFICANT CHANGE UP (ref 0.5–2)
LYMPHOCYTES # BLD AUTO: 1.2 K/UL — SIGNIFICANT CHANGE UP (ref 1–3.3)
LYMPHOCYTES # BLD AUTO: 12.7 % — LOW (ref 13–44)
MCHC RBC-ENTMCNC: 31.3 PG — SIGNIFICANT CHANGE UP (ref 27–34)
MCHC RBC-ENTMCNC: 31.3 PG — SIGNIFICANT CHANGE UP (ref 27–34)
MCHC RBC-ENTMCNC: 32.9 GM/DL — SIGNIFICANT CHANGE UP (ref 32–36)
MCHC RBC-ENTMCNC: 32.9 GM/DL — SIGNIFICANT CHANGE UP (ref 32–36)
MCV RBC AUTO: 95 FL — SIGNIFICANT CHANGE UP (ref 80–100)
MCV RBC AUTO: 95.3 FL — SIGNIFICANT CHANGE UP (ref 80–100)
MONOCYTES # BLD AUTO: 0.67 K/UL — SIGNIFICANT CHANGE UP (ref 0–0.9)
MONOCYTES NFR BLD AUTO: 7.1 % — SIGNIFICANT CHANGE UP (ref 2–14)
NEUTROPHILS # BLD AUTO: 7.52 K/UL — HIGH (ref 1.8–7.4)
NEUTROPHILS NFR BLD AUTO: 79.3 % — HIGH (ref 43–77)
NRBC # BLD: 0 /100 WBCS — SIGNIFICANT CHANGE UP (ref 0–0)
NRBC # BLD: 0 /100 WBCS — SIGNIFICANT CHANGE UP (ref 0–0)
PLATELET # BLD AUTO: 277 K/UL — SIGNIFICANT CHANGE UP (ref 150–400)
PLATELET # BLD AUTO: 282 K/UL — SIGNIFICANT CHANGE UP (ref 150–400)
POTASSIUM SERPL-MCNC: 4.3 MMOL/L — SIGNIFICANT CHANGE UP (ref 3.5–5.3)
POTASSIUM SERPL-MCNC: 4.4 MMOL/L — SIGNIFICANT CHANGE UP (ref 3.5–5.3)
POTASSIUM SERPL-SCNC: 4.3 MMOL/L — SIGNIFICANT CHANGE UP (ref 3.5–5.3)
POTASSIUM SERPL-SCNC: 4.4 MMOL/L — SIGNIFICANT CHANGE UP (ref 3.5–5.3)
RBC # BLD: 3.16 M/UL — LOW (ref 3.8–5.2)
RBC # BLD: 3.42 M/UL — LOW (ref 3.8–5.2)
RBC # FLD: 14 % — SIGNIFICANT CHANGE UP (ref 10.3–14.5)
RBC # FLD: 14 % — SIGNIFICANT CHANGE UP (ref 10.3–14.5)
SODIUM SERPL-SCNC: 133 MMOL/L — LOW (ref 135–145)
SODIUM SERPL-SCNC: 137 MMOL/L — SIGNIFICANT CHANGE UP (ref 135–145)
TROPONIN T, HIGH SENSITIVITY RESULT: 12 NG/L — SIGNIFICANT CHANGE UP (ref 0–51)
TROPONIN T, HIGH SENSITIVITY RESULT: 12 NG/L — SIGNIFICANT CHANGE UP (ref 0–51)
WBC # BLD: 10.59 K/UL — HIGH (ref 3.8–10.5)
WBC # BLD: 9.48 K/UL — SIGNIFICANT CHANGE UP (ref 3.8–10.5)
WBC # FLD AUTO: 10.59 K/UL — HIGH (ref 3.8–10.5)
WBC # FLD AUTO: 9.48 K/UL — SIGNIFICANT CHANGE UP (ref 3.8–10.5)

## 2024-05-15 PROCEDURE — 99233 SBSQ HOSP IP/OBS HIGH 50: CPT

## 2024-05-15 PROCEDURE — 99222 1ST HOSP IP/OBS MODERATE 55: CPT | Mod: GC

## 2024-05-15 PROCEDURE — 71045 X-RAY EXAM CHEST 1 VIEW: CPT | Mod: 26

## 2024-05-15 PROCEDURE — 71275 CT ANGIOGRAPHY CHEST: CPT | Mod: 26

## 2024-05-15 PROCEDURE — 74018 RADEX ABDOMEN 1 VIEW: CPT | Mod: 26

## 2024-05-15 PROCEDURE — 74177 CT ABD & PELVIS W/CONTRAST: CPT | Mod: 26

## 2024-05-15 RX ORDER — METOCLOPRAMIDE HCL 10 MG
10 TABLET ORAL ONCE
Refills: 0 | Status: COMPLETED | OUTPATIENT
Start: 2024-05-15 | End: 2024-05-15

## 2024-05-15 RX ORDER — SODIUM CHLORIDE 9 MG/ML
1000 INJECTION, SOLUTION INTRAVENOUS
Refills: 0 | Status: DISCONTINUED | OUTPATIENT
Start: 2024-05-15 | End: 2024-05-19

## 2024-05-15 RX ORDER — PANTOPRAZOLE SODIUM 20 MG/1
40 TABLET, DELAYED RELEASE ORAL DAILY
Refills: 0 | Status: DISCONTINUED | OUTPATIENT
Start: 2024-05-15 | End: 2024-05-19

## 2024-05-15 RX ORDER — ONDANSETRON 8 MG/1
4 TABLET, FILM COATED ORAL EVERY 6 HOURS
Refills: 0 | Status: DISCONTINUED | OUTPATIENT
Start: 2024-05-15 | End: 2024-05-19

## 2024-05-15 RX ORDER — SODIUM CHLORIDE 9 MG/ML
1000 INJECTION, SOLUTION INTRAVENOUS ONCE
Refills: 0 | Status: COMPLETED | OUTPATIENT
Start: 2024-05-15 | End: 2024-05-15

## 2024-05-15 RX ORDER — ACETAMINOPHEN 500 MG
1000 TABLET ORAL ONCE
Refills: 0 | Status: COMPLETED | OUTPATIENT
Start: 2024-05-15 | End: 2024-05-16

## 2024-05-15 RX ORDER — DIPHENHYDRAMINE HCL 50 MG
50 CAPSULE ORAL AT BEDTIME
Refills: 0 | Status: DISCONTINUED | OUTPATIENT
Start: 2024-05-15 | End: 2024-05-19

## 2024-05-15 RX ORDER — ACETAMINOPHEN 500 MG
1000 TABLET ORAL ONCE
Refills: 0 | Status: COMPLETED | OUTPATIENT
Start: 2024-05-15 | End: 2024-05-15

## 2024-05-15 RX ADMIN — HYDROMORPHONE HYDROCHLORIDE 2 MILLIGRAM(S): 2 INJECTION INTRAMUSCULAR; INTRAVENOUS; SUBCUTANEOUS at 00:51

## 2024-05-15 RX ADMIN — ENOXAPARIN SODIUM 40 MILLIGRAM(S): 100 INJECTION SUBCUTANEOUS at 22:59

## 2024-05-15 RX ADMIN — HYDROMORPHONE HYDROCHLORIDE 2 MILLIGRAM(S): 2 INJECTION INTRAMUSCULAR; INTRAVENOUS; SUBCUTANEOUS at 05:07

## 2024-05-15 RX ADMIN — Medication 10 MILLIGRAM(S): at 07:37

## 2024-05-15 RX ADMIN — MORPHINE SULFATE 2 MILLIGRAM(S): 50 CAPSULE, EXTENDED RELEASE ORAL at 11:51

## 2024-05-15 RX ADMIN — Medication 4: at 12:05

## 2024-05-15 RX ADMIN — Medication 5 MILLIGRAM(S): at 05:08

## 2024-05-15 RX ADMIN — HYDROMORPHONE HYDROCHLORIDE 2 MILLIGRAM(S): 2 INJECTION INTRAMUSCULAR; INTRAVENOUS; SUBCUTANEOUS at 06:07

## 2024-05-15 RX ADMIN — Medication 30 MILLILITER(S): at 06:44

## 2024-05-15 RX ADMIN — GABAPENTIN 600 MILLIGRAM(S): 400 CAPSULE ORAL at 05:08

## 2024-05-15 RX ADMIN — ONDANSETRON 4 MILLIGRAM(S): 8 TABLET, FILM COATED ORAL at 05:08

## 2024-05-15 RX ADMIN — Medication 4: at 07:56

## 2024-05-15 RX ADMIN — Medication 10 MILLIGRAM(S): at 13:54

## 2024-05-15 RX ADMIN — Medication 2: at 22:59

## 2024-05-15 RX ADMIN — MORPHINE SULFATE 2 MILLIGRAM(S): 50 CAPSULE, EXTENDED RELEASE ORAL at 12:06

## 2024-05-15 RX ADMIN — Medication 4: at 17:41

## 2024-05-15 RX ADMIN — Medication 400 MILLIGRAM(S): at 14:57

## 2024-05-15 RX ADMIN — SODIUM CHLORIDE 100 MILLILITER(S): 9 INJECTION, SOLUTION INTRAVENOUS at 11:51

## 2024-05-15 RX ADMIN — Medication 1000 MILLIGRAM(S): at 15:25

## 2024-05-15 RX ADMIN — ONDANSETRON 4 MILLIGRAM(S): 8 TABLET, FILM COATED ORAL at 11:51

## 2024-05-15 RX ADMIN — Medication 50 MILLIGRAM(S): at 23:22

## 2024-05-15 RX ADMIN — Medication 1000 MILLIGRAM(S): at 06:07

## 2024-05-15 RX ADMIN — Medication 1000 MILLIGRAM(S): at 05:08

## 2024-05-15 RX ADMIN — SODIUM CHLORIDE 1000 MILLILITER(S): 9 INJECTION, SOLUTION INTRAVENOUS at 13:54

## 2024-05-15 RX ADMIN — LISINOPRIL 20 MILLIGRAM(S): 2.5 TABLET ORAL at 05:07

## 2024-05-15 RX ADMIN — Medication 10 MILLIGRAM(S): at 07:23

## 2024-05-15 RX ADMIN — PANTOPRAZOLE SODIUM 40 MILLIGRAM(S): 20 TABLET, DELAYED RELEASE ORAL at 05:08

## 2024-05-15 RX ADMIN — MAGNESIUM HYDROXIDE 30 MILLILITER(S): 400 TABLET, CHEWABLE ORAL at 05:08

## 2024-05-15 NOTE — PROVIDER CONTACT NOTE (CHANGE IN STATUS NOTIFICATION) - DATE AND TIME:
The patient is Stable - Low risk of patient condition declining or worsening    Shift Goals  Clinical Goals: Manage pain and wean off oxygen supplement  Patient Goals: for discharge    Progress made toward(s) clinical / shift goals:  Pt is educated to call for pain medication when needed,                  Walk test for oxygen done    Patient is not progressing towards the following goals:      Problem: Pain - Standard  Goal: Alleviation of pain or a reduction in pain to the patient’s comfort goal  Outcome: Progressing     Problem: Fall Risk  Goal: Patient will remain free from falls  Outcome: Progressing     Problem: Mobility  Goal: Patient's capacity to carry out activities will improve  Outcome: Progressing     Problem: Discharge Barriers/Planning  Goal: Patient's continuum of care needs are met  Outcome: Progressing      15-May-2024 10:55

## 2024-05-15 NOTE — PROGRESS NOTE ADULT - SUBJECTIVE AND OBJECTIVE BOX
Ortho Note    Surgery: s/p L4-5 TLIF, L4-S1 PSF with Dr. Evangelista POD #2    Patient seen and examined at bedside AM and PM  Change in clinical status today  Patient with nausea/vomiting overnight into AM   failed TOV o/n, straight cath at 2am   Patient endorsing CP/SOB with deep inspiration and increasing abdominal distension     Patient clinical status evolved with increasing sinus tachycardia to 120s  transferred from 8wo to 9wo        Vital Signs Last 24 Hrs  T(C): 36.4 (05-15-24 @ 13:52), Max: 37.6 (05-15-24 @ 11:02)  T(F): 97.6 (05-15-24 @ 13:52), Max: 99.7 (05-15-24 @ 11:02)  HR: 129 (05-15-24 @ 13:52) (116 - 129)  BP: 121/63 (05-15-24 @ 13:52) (121/63 - 162/77)  BP(mean): --  RR: 20 (05-15-24 @ 13:52) (20 - 20)  SpO2: 94% (05-15-24 @ 13:52) (94% - 95%)  AVSS    General: Pt awake, diarphoretic, actively dry heaving during multiple examinations, lethargic appearing   HV x2 in place  extremities warm and well perfused   Sensation: intact to light touch bilateral LE   Motor: EHL/FHL/TA/GS firing bilaterally         A/P: 74yFemale POD#2 s/p  s/p L4-5 TLIF, L4-S1 PSF with Dr. Evangelista POD #2    - patient with excessive vomiting today, bowel distension, tachycardia, sx with CP/SOB: KUB performed with dilated loops of bowel consistent with ileus   - EKG performed, ?lead changes II, III- per hospitalist suspect lead placement artifact, troponin within normal limits- repeat 1800  - Sinus tachycardia on monitor, saturating on RA- CTA PE protocol ordered r/o PE  - NG tube placed by Gen Surg resident - 1.3 L bile suctioned from abd upon placement- CXR performed confirming tip of NG tube in stomach  - Patient NPO , CT abdomen w/w/o IV contrast ordered to r/o obstruction vs ileus   - + BM with urination after suppository   - 1L bolus given, continue LR at 100cc/hr  - Endocrine consult for DM management/hyperglycemia/recommendations in the setting of NPO , f/u endocrine recommendations  - Pain Control: IV tylenol/morphine today   - DVT ppx: start lovenox this evening, if CT PE positive, will start therapeutic management   - PT, WBS: WBAT, PT deferred today due to clinical status, will plan to resume tomorrow 5/16     Ortho Pager [phone]

## 2024-05-15 NOTE — CONSULT NOTE ADULT - ATTENDING COMMENTS
Pt seen on rounds this afternoon.  74-yo woman with HTN, HLP, type 2 DM and severe lumbar spinal stenosis with intractable back pain underwent decompressive laminectomy and fusion L4-S1 yesterday.  Post-op course has been complicated by ileus.  Has a 10-year history of type 2 DM, managed PTA on a combination of metformin and glimepiride  Reports fingersticks at home of 110-130 in the morning, "less than 200" later in the day  A1c level is mildly above target at 7.5%  Glucoses since surgery (with the pt NPO) have been 200-230  Pt appeared to be moderately uncomfortable from her abdominal distension and from back pain (which she says is from the incision--not her usual spinal pain).  Abdomen is markedly distended with diffuse guarding.  Bowel sounds are active.  Has no peripheral edema  She denies any significant problems with constipation at ome, and does not take laxatives regularly  The hyperglycemia is likely from post-op stress and pain, though will need to check whether she received steroids in the OR.  She has not been started on steroids post-op  Will start Lantus 10 units qhs, continue sliding scale coverage

## 2024-05-15 NOTE — CONSULT NOTE ADULT - SUBJECTIVE AND OBJECTIVE BOX
74 year old Female with a past medical history of hypertension, diabetes, hyperlipidemia, arthritis, gastroesophageal reflux disease, and osteoarthritis, admitted for elective laminectomy with posterolateral fusion L4-L5 with tlif, margo and reinstrumentation L4-S1 and POD2.     Age at Dx:  How dx:  Hx and duration of insulin:  Current Therapy:  Hx of hypoglycemia  Hx of DKA/HHS?    Home FSG:  Fasting  Lunch  Dinner  Bed    Hx of other regimens  Complications:  Outpatient Endo:    PMH & Surgical Hx:M48.062    Handoff    MEWS Score    HTN (hypertension)    Diabetes    HLD (hyperlipidemia)    Colon polyp    Arthritis    GERD (gastroesophageal reflux disease)    Osteoarthritis    H/O urinary incontinence    Lumbar stenosis    Lumbar stenosis    Fusion, spine, lumbar, TLIF, 1-2 levels    H/O low back pain    HTN (hypertension)    Diabetes    HLD (hyperlipidemia)    H/O low back pain    Fusion, spine, lumbar, TLIF, 1-2 levels    H/O abdominal hysterectomy    History of appendectomy    History of back surgery    H/O tubal ligation    Room Service Assist    [email or web address]        FH:  DM:  Thyroid:  Autoimmune:  Other:    SH:  Smoking  Etoh:  Recreational Drugs:  Social Life:    Current Meds:  acetaminophen     Tablet .. 1000 milliGRAM(s) Oral every 8 hours  acetaminophen   IVPB .. 1000 milliGRAM(s) IV Intermittent once  aluminum hydroxide/magnesium hydroxide/simethicone Suspension 30 milliLiter(s) Oral every 12 hours PRN  atorvastatin 40 milliGRAM(s) Oral at bedtime  benzocaine/menthol Lozenge 1 Lozenge Oral every 2 hours PRN  bisacodyl 5 milliGRAM(s) Oral every 12 hours PRN  dextrose 10% Bolus 125 milliLiter(s) IV Bolus once  dextrose 5%. 1000 milliLiter(s) IV Continuous <Continuous>  dextrose 5%. 1000 milliLiter(s) IV Continuous <Continuous>  dextrose 50% Injectable 25 Gram(s) IV Push once  dextrose 50% Injectable 12.5 Gram(s) IV Push once  dextrose Oral Gel 15 Gram(s) Oral once PRN  diphenhydrAMINE 25 milliGRAM(s) Oral every 6 hours PRN  enoxaparin Injectable 40 milliGRAM(s) SubCutaneous every 24 hours  gabapentin 600 milliGRAM(s) Oral every 8 hours  glucagon  Injectable 1 milliGRAM(s) IntraMuscular once  HYDROmorphone   Tablet 2 milliGRAM(s) Oral every 4 hours PRN  insulin lispro (ADMELOG) corrective regimen sliding scale   SubCutaneous Before meals and at bedtime  lactated ringers. 1000 milliLiter(s) IV Continuous <Continuous>  lisinopril 20 milliGRAM(s) Oral daily  magnesium hydroxide Suspension 30 milliLiter(s) Oral every 12 hours PRN  melatonin 5 milliGRAM(s) Oral at bedtime PRN  morphine  - Injectable 2 milliGRAM(s) IV Push every 4 hours PRN  ondansetron   Disintegrating Tablet 4 milliGRAM(s) Oral every 6 hours  pantoprazole    Tablet 40 milliGRAM(s) Oral before breakfast  polyethylene glycol 3350 17 Gram(s) Oral at bedtime  senna 2 Tablet(s) Oral at bedtime  SUMAtriptan 50 milliGRAM(s) Oral daily PRN      Allergies:  oxycodone (Pruritus)  Dilaudid (Pruritus)      ROS:  Denies the following except as indicated.    General: weight loss/weight gain, decreased appetite, fatigue  Eyes: Blurry vision, double vision, visual changes  ENT: Throat pain, changes in voice,   CV: palpitations, SOB, CP, cough  GI: NVD, difficulty swallowing, abdominal pain  : polyuria, dysuria  Endo: abnormal menses, temperature intolerance, decreased libido  MSK: weakness, joint pain  Skin: rash, dryness, diaphoresis  Heme: Easy bruising,bleeding  Neuro: HA, dizziness, lightheadedness, numbness tingling  Psych: Anxiety, Depression    Vital Signs Last 24 Hrs  T(C): 36.4 (15 May 2024 13:52), Max: 37.7 (14 May 2024 15:55)  T(F): 97.6 (15 May 2024 13:52), Max: 99.8 (14 May 2024 15:55)  HR: 129 (15 May 2024 13:52) (101 - 129)  BP: 121/63 (15 May 2024 13:52) (113/62 - 162/77)  BP(mean): --  RR: 20 (15 May 2024 13:52) (18 - 20)  SpO2: 94% (15 May 2024 13:52) (94% - 96%)    Parameters below as of 15 May 2024 13:52  Patient On (Oxygen Delivery Method): room air      Height (cm): 167.6 (05-13 @ 07:35)  Weight (kg): 75.4 (05-13 @ 07:35)  BMI (kg/m2): 26.8 (05-13 @ 07:35)      Constitutional: wn/wd in NAD.   HEENT: NCAT, MMM, OP clear, EOMI, , no proptosis or lid retraction  Neck: no thyromegaly or palpable thyroid nodules   Respiratory: lungs CTAB.  Cardiovascular: regular rhythm, normal S1 and S2, no audible murmurs, no peripheral edema  GI: soft, NT/ND, no masses/HSM appreciated.  Neurology: no tremors, DTR 2+  Skin: no visible rashes/lesions  Psychiatric: AAO x 3, normal affect/mood.  Ext: radial pulses intact, DP pulses intact, extremities warm, no cyanosis, clubbing or edema.       LABS:                        10.7   10.59 )-----------( 277      ( 15 May 2024 13:43 )             32.5     05-15    133<L>  |  100  |  8   ----------------------------<  207<H>  4.4   |  24  |  0.50    Ca    9.9      15 May 2024 13:43    TPro  6.7  /  Alb  4.3  /  TBili  0.2  /  DBili  x   /  AST  31  /  ALT  18  /  AlkPhos  63  05-13    PT/INR - ( 13 May 2024 18:18 )   PT: 11.0 sec;   INR: 0.96          PTT - ( 13 May 2024 18:18 )  PTT:27.4 sec  Urinalysis Basic - ( 15 May 2024 13:43 )    Color: x / Appearance: x / SG: x / pH: x  Gluc: 207 mg/dL / Ketone: x  / Bili: x / Urobili: x   Blood: x / Protein: x / Nitrite: x   Leuk Esterase: x / RBC: x / WBC x   Sq Epi: x / Non Sq Epi: x / Bacteria: x            RADIOLOGY & ADDITIONAL STUDIES:  CAPILLARY BLOOD GLUCOSE      POCT Blood Glucose.: 203 mg/dL (15 May 2024 11:55)  POCT Blood Glucose.: 233 mg/dL (15 May 2024 06:38)  POCT Blood Glucose.: 200 mg/dL (14 May 2024 21:35)  POCT Blood Glucose.: 235 mg/dL (14 May 2024 17:23) 74 year old Female with a past medical history of hypertension, diabetes, hyperlipidemia, arthritis, gastroesophageal reflux disease, and osteoarthritis, admitted for elective laminectomy with posterolateral fusion L4-L5 with tlif, margo and reinstrumentation L4-S1 and POD2. Course complicated by ileus. Post-op fingersticks 126-130.     Patient seen and examined by bedside. She reports 1 small bowel movement this morning. She reports worsening abdominal distension since 5/13. She has been getting dilaudid and morphine for back pain. She has been getting senna/miralax, dulcolax and reglan for constipation. She had multiple episodes of bilious vomiting since this morning.     Age at Dx: 65 yo  Hx and duration of insulin: never  Current Therapy: metformin 1g bid and glimepiride 1 mg qd  Hx of hypoglycemia never  Hx of DKA/HHS? never    Home FSG:    Fasting 110-130s  Lunch <200  Dinner <200  Bed <200    Hx of other regimens  Complications: peripheral neuropathy, on gabapentin 600 tid  Outpatient Endo: was supposed to be set up with outpatient endocrinologist but lost to follow up     HTN (hypertension)    Diabetes    HLD (hyperlipidemia)    Colon polyp    Arthritis    GERD (gastroesophageal reflux disease)    Osteoarthritis    H/O urinary incontinence    Lumbar stenosis    Lumbar stenosis    Fusion, spine, lumbar, TLIF, 1-2 levels    H/O low back pain    HTN (hypertension)    Diabetes    HLD (hyperlipidemia)    H/O low back pain    Fusion, spine, lumbar, TLIF, 1-2 levels    H/O abdominal hysterectomy    History of appendectomy    History of back surgery    H/O tubal ligation    FH:  DM: none  Thyroid: daughter has hyperthyroidism  Autoimmune: none    SH: stopped working in Dec 2023  Smoking no  Etoh: no  Recreational Drugs: no    Current Meds:  acetaminophen     Tablet .. 1000 milliGRAM(s) Oral every 8 hours  acetaminophen   IVPB .. 1000 milliGRAM(s) IV Intermittent once  aluminum hydroxide/magnesium hydroxide/simethicone Suspension 30 milliLiter(s) Oral every 12 hours PRN  atorvastatin 40 milliGRAM(s) Oral at bedtime  benzocaine/menthol Lozenge 1 Lozenge Oral every 2 hours PRN  bisacodyl 5 milliGRAM(s) Oral every 12 hours PRN  dextrose 10% Bolus 125 milliLiter(s) IV Bolus once  dextrose 5%. 1000 milliLiter(s) IV Continuous <Continuous>  dextrose 5%. 1000 milliLiter(s) IV Continuous <Continuous>  dextrose 50% Injectable 25 Gram(s) IV Push once  dextrose 50% Injectable 12.5 Gram(s) IV Push once  dextrose Oral Gel 15 Gram(s) Oral once PRN  diphenhydrAMINE 25 milliGRAM(s) Oral every 6 hours PRN  enoxaparin Injectable 40 milliGRAM(s) SubCutaneous every 24 hours  gabapentin 600 milliGRAM(s) Oral every 8 hours  glucagon  Injectable 1 milliGRAM(s) IntraMuscular once  HYDROmorphone   Tablet 2 milliGRAM(s) Oral every 4 hours PRN  insulin lispro (ADMELOG) corrective regimen sliding scale   SubCutaneous Before meals and at bedtime  lactated ringers. 1000 milliLiter(s) IV Continuous <Continuous>  lisinopril 20 milliGRAM(s) Oral daily  magnesium hydroxide Suspension 30 milliLiter(s) Oral every 12 hours PRN  melatonin 5 milliGRAM(s) Oral at bedtime PRN  morphine  - Injectable 2 milliGRAM(s) IV Push every 4 hours PRN  ondansetron   Disintegrating Tablet 4 milliGRAM(s) Oral every 6 hours  pantoprazole    Tablet 40 milliGRAM(s) Oral before breakfast  polyethylene glycol 3350 17 Gram(s) Oral at bedtime  senna 2 Tablet(s) Oral at bedtime  SUMAtriptan 50 milliGRAM(s) Oral daily PRN      Allergies:  oxycodone (Pruritus)  Dilaudid (Pruritus)      Vital Signs Last 24 Hrs  T(C): 36.4 (15 May 2024 13:52), Max: 37.7 (14 May 2024 15:55)  T(F): 97.6 (15 May 2024 13:52), Max: 99.8 (14 May 2024 15:55)  HR: 129 (15 May 2024 13:52) (101 - 129)  BP: 121/63 (15 May 2024 13:52) (113/62 - 162/77)  BP(mean): --  RR: 20 (15 May 2024 13:52) (18 - 20)  SpO2: 94% (15 May 2024 13:52) (94% - 96%)    Parameters below as of 15 May 2024 13:52  Patient On (Oxygen Delivery Method): room air      Height (cm): 167.6 (05-13 @ 07:35)  Weight (kg): 75.4 (05-13 @ 07:35)  BMI (kg/m2): 26.8 (05-13 @ 07:35)      Constitutional: wn/wd in NAD.   HEENT: NCAT, MMM, OP clear, EOMI, , no proptosis or lid retraction  Neck: no thyromegaly or palpable thyroid nodules   Respiratory: lungs CTAB.  Cardiovascular: regular rhythm, normal S1 and S2, no audible murmurs, no peripheral edema  GI: soft, NT, distended no masses/HSM appreciated, hypoactive bowel sounds   Neurology: no tremors, DTR 2+  Skin: no visible rashes/lesions  Psychiatric: AAO x 3, normal affect/mood.  Ext: radial pulses intact, DP pulses intact, extremities warm, no cyanosis, clubbing or edema.       LABS:                        10.7   10.59 )-----------( 277      ( 15 May 2024 13:43 )             32.5     05-15    133<L>  |  100  |  8   ----------------------------<  207<H>  4.4   |  24  |  0.50    Ca    9.9      15 May 2024 13:43    TPro  6.7  /  Alb  4.3  /  TBili  0.2  /  DBili  x   /  AST  31  /  ALT  18  /  AlkPhos  63  05-13    PT/INR - ( 13 May 2024 18:18 )   PT: 11.0 sec;   INR: 0.96          PTT - ( 13 May 2024 18:18 )  PTT:27.4 sec  Urinalysis Basic - ( 15 May 2024 13:43 )    Color: x / Appearance: x / SG: x / pH: x  Gluc: 207 mg/dL / Ketone: x  / Bili: x / Urobili: x   Blood: x / Protein: x / Nitrite: x   Leuk Esterase: x / RBC: x / WBC x   Sq Epi: x / Non Sq Epi: x / Bacteria: x            RADIOLOGY & ADDITIONAL STUDIES:  CAPILLARY BLOOD GLUCOSE      POCT Blood Glucose.: 203 mg/dL (15 May 2024 11:55)  POCT Blood Glucose.: 233 mg/dL (15 May 2024 06:38)  POCT Blood Glucose.: 200 mg/dL (14 May 2024 21:35)  POCT Blood Glucose.: 235 mg/dL (14 May 2024 17:23)

## 2024-05-15 NOTE — CONSULT NOTE ADULT - ASSESSMENT
A/P:  74 year old Female with a past medical history of hypertension, diabetes, hyperlipidemia, arthritis, gastroesophageal reflux disease, and osteoarthritis, admitted for elective laminectomy with posterolateral fusion L4-L5 with tlif, margo and reinstrumentation L4-S1 and POD2. Endocrinology consulted for management of diabetes.       1.  T2DM  Please continue lantus       units at night / morning.  Please continue lispro      units before each meal.  Please continue lispro moderate / low dose sliding scale four times daily with meals and at bedtime    Pt's fingerstick glucose goal is     Will continue to monitor     For discharge, pt can continue    Pt can follow up at discharge with [facility] by calling  to make an appointment.   Will discuss case with     and update primary team A/P:  74 year old Female with a past medical history of hypertension, diabetes, hyperlipidemia, arthritis, gastroesophageal reflux disease, and osteoarthritis, admitted for elective laminectomy with posterolateral fusion L4-L5 with tlif, margo and reinstrumentation L4-S1 and POD2. Endocrinology consulted for management of diabetes. Course c/b post-op ileus and most likely opioid-induced constipation    Noted with A1c 7.5  Weight: 65.4 Kg  BUN 8    1.  T2DM  - Please start lantus 10U at bedtime  - Please verify fingersticks q6h with mISS while NPO  - Continue LR 100cc/h    2. Post-op ileus/opioid-induced constipation  - Continue miralax at bedtime  - Start senna and dulcolax BID  - Give bisacodyl suppository 10 mg TX once   - Limit use of opioids and initiate other non-opioid pain regimens, consider pain management consult   - Ensure OOBTC  - Defer rest of management to Medicine consult service     Will continue to monitor     Pt can follow up at discharge with [facility] by calling  to make an appointment.     Discussed with Dr Alvarez.

## 2024-05-15 NOTE — PROGRESS NOTE ADULT - ASSESSMENT
Patient is a 74y old  Female who presents with a chief complaint of low back pain (14 May 2024 14:26)      Impression and Plan   # Low back pain S/p  L4-L5 TLIF, L4-S1 PSF on 5/13/24  - pain control , DVT prophylaxis , Weightbearing status , Dressing changes and drain care per ortho team    # Ileus  - NG Tube   - IV LR bolus Followed by IV LR at 100ml/hr   - Monitor BMP , Check lactate   - CT abd and pelvis   - NPO     # Chest pain   - EKG iwth Sinus Tachy,  rate related ST-T depression , S1Q3T3 pattern , Check Troponin , CTA Chest to r/o PE     # Tremulousness - resolved   - ? myoclonus   - Likely complication of medication  ( amitriptyline , Methocarbamol , escitalopram , tramadol ? Anaesthetics   - on exam does not have ankle clonus   -  has some meds that can cause Serotonin syndrome , however does not meet any of the components of jayne  criteria   - hold amitriptyline , tramadol   -    # Diabetes  - Lantus was recommended on 5/14 , not started   - on glimepiride and metformin at home , currently on hold   - Can give Lantus 5 Units at bed time as patient is NPO now   - sliding scale insulin   - F/u Endocrine recs     # HTN   - hold Lisinopril while NPO     # HLD   -Atorvastatin     #DVT PPx - Per Ortho

## 2024-05-15 NOTE — CHART NOTE - NSCHARTNOTEFT_GEN_A_CORE
Senior surgical resident addendum:    74F PMG DM, HTN, HLD, GERD, appendectomy, hysterectomy, now POD2 s/p posterior lumbar fusion L4-S1 w/ 1d distension, nausea, emesis, c/s for possible ileus v pSBO. BM x1 this AM, -F. Had desat and tachy to 120s earlier today, now normalized, CTA PE negative. NGT placed at time w/ 1.5L bilious fluid immediately. Abdo soft, distended, NT. WBC 10.6, Na 133, Cr 0.5. CT ileus v pSBO. Plan NGT, SAEs, NPO, avoid narcotics, optimize electrolytes.    Rut Ruiz, PGY-4  General Surgery

## 2024-05-15 NOTE — PROGRESS NOTE ADULT - SUBJECTIVE AND OBJECTIVE BOX
Patient is a 74y old  Female who presents with a chief complaint of low back pain (15 May 2024 14:38)        SUBJECTIVE:  Patient was seen and examined at bedside.    Overnight Events :  No further tremulousness, however patient has been vomiting repeatedly - bilious , since early this morning > 5 episodes     she denies abd pain  denies passing flatus     she then reported chest pain - pleuritic in nature and shortness of breath     She also had urinary retention last night needing Intermittent Straight catheterization       Review of systems: 12 point Review of systems negative unless otherwise documented elsewhere in note.     Diet, NPO:   Except Medications (05-15-24 @ 12:32) [Active]      MEDICATIONS:  MEDICATIONS  (STANDING):  acetaminophen     Tablet .. 1000 milliGRAM(s) Oral every 8 hours  atorvastatin 40 milliGRAM(s) Oral at bedtime  dextrose 10% Bolus 125 milliLiter(s) IV Bolus once  dextrose 5%. 1000 milliLiter(s) (50 mL/Hr) IV Continuous <Continuous>  dextrose 5%. 1000 milliLiter(s) (100 mL/Hr) IV Continuous <Continuous>  dextrose 50% Injectable 25 Gram(s) IV Push once  dextrose 50% Injectable 12.5 Gram(s) IV Push once  enoxaparin Injectable 40 milliGRAM(s) SubCutaneous every 24 hours  gabapentin 600 milliGRAM(s) Oral every 8 hours  glucagon  Injectable 1 milliGRAM(s) IntraMuscular once  insulin lispro (ADMELOG) corrective regimen sliding scale   SubCutaneous Before meals and at bedtime  lactated ringers. 1000 milliLiter(s) (100 mL/Hr) IV Continuous <Continuous>  lisinopril 20 milliGRAM(s) Oral daily  ondansetron   Disintegrating Tablet 4 milliGRAM(s) Oral every 6 hours  pantoprazole    Tablet 40 milliGRAM(s) Oral before breakfast  polyethylene glycol 3350 17 Gram(s) Oral at bedtime  senna 2 Tablet(s) Oral at bedtime    MEDICATIONS  (PRN):  aluminum hydroxide/magnesium hydroxide/simethicone Suspension 30 milliLiter(s) Oral every 12 hours PRN Indigestion  benzocaine/menthol Lozenge 1 Lozenge Oral every 2 hours PRN Sore Throat  bisacodyl 5 milliGRAM(s) Oral every 12 hours PRN Constipation  dextrose Oral Gel 15 Gram(s) Oral once PRN Blood Glucose LESS THAN 70 milliGRAM(s)/deciliter  diphenhydrAMINE 25 milliGRAM(s) Oral every 6 hours PRN Rash and/or Itching  HYDROmorphone   Tablet 2 milliGRAM(s) Oral every 4 hours PRN Severe Pain (7 - 10)  magnesium hydroxide Suspension 30 milliLiter(s) Oral every 12 hours PRN Constipation  melatonin 5 milliGRAM(s) Oral at bedtime PRN Insomnia  morphine  - Injectable 2 milliGRAM(s) IV Push every 4 hours PRN Breakthrough  SUMAtriptan 50 milliGRAM(s) Oral daily PRN Headache      Allergies    oxycodone (Pruritus)    Intolerances    Dilaudid (Pruritus)      OBJECTIVE:  Vital Signs Last 24 Hrs  T(C): 36.4 (15 May 2024 13:52), Max: 37.7 (14 May 2024 15:55)  T(F): 97.6 (15 May 2024 13:52), Max: 99.8 (14 May 2024 15:55)  HR: 129 (15 May 2024 13:52) (101 - 129)  BP: 121/63 (15 May 2024 13:52) (113/62 - 162/77)  BP(mean): --  RR: 20 (15 May 2024 13:52) (18 - 20)  SpO2: 94% (15 May 2024 13:52) (94% - 96%)    Parameters below as of 15 May 2024 13:52  Patient On (Oxygen Delivery Method): room air      I&O's Summary    14 May 2024 07:01  -  15 May 2024 07:00  --------------------------------------------------------  IN: 480 mL / OUT: 3175 mL / NET: -2695 mL    15 May 2024 07:01  -  15 May 2024 15:50  --------------------------------------------------------  IN: 200 mL / OUT: 1200 mL / NET: -1000 mL        PHYSICAL EXAM:  Gen:  mild distress   HEENT: dry mucose   Neck: supple, trachea at midline  CV: tachycardia   Pulm: no wheezing , no crackles  no increase in work of breathing  Abd: Distended , Firm , No Bowel sounds   Skin: Cool , Clammy    Ext: moving all 4 extremities spontaneously , no edema  ,  Neuro: AOx3, no gross focal neurological deficits  Psych: affect and behavior appropriate, pleasant at time of interview    LABS:                        10.7   10.59 )-----------( 277      ( 15 May 2024 13:43 )             32.5     05-15    133<L>  |  100  |  8   ----------------------------<  207<H>  4.4   |  24  |  0.50    Ca    9.9      15 May 2024 13:43    TPro  6.7  /  Alb  4.3  /  TBili  0.2  /  DBili  x   /  AST  31  /  ALT  18  /  AlkPhos  63  05-13    LIVER FUNCTIONS - ( 13 May 2024 18:18 )  Alb: 4.3 g/dL / Pro: 6.7 g/dL / ALK PHOS: 63 U/L / ALT: 18 U/L / AST: 31 U/L / GGT: x           PT/INR - ( 13 May 2024 18:18 )   PT: 11.0 sec;   INR: 0.96          PTT - ( 13 May 2024 18:18 )  PTT:27.4 sec  CAPILLARY BLOOD GLUCOSE      POCT Blood Glucose.: 203 mg/dL (15 May 2024 11:55)  POCT Blood Glucose.: 233 mg/dL (15 May 2024 06:38)  POCT Blood Glucose.: 200 mg/dL (14 May 2024 21:35)  POCT Blood Glucose.: 235 mg/dL (14 May 2024 17:23)    Urinalysis Basic - ( 15 May 2024 13:43 )    Color: x / Appearance: x / SG: x / pH: x  Gluc: 207 mg/dL / Ketone: x  / Bili: x / Urobili: x   Blood: x / Protein: x / Nitrite: x   Leuk Esterase: x / RBC: x / WBC x   Sq Epi: x / Non Sq Epi: x / Bacteria: x        MICRODATA:      RADIOLOGY/OTHER STUDIES:

## 2024-05-15 NOTE — PROGRESS NOTE ADULT - SUBJECTIVE AND OBJECTIVE BOX
Orthopedics Floor Note    Procedure: L4-L5 TLIF, L4-S1 PSF   Surgeon: Dr Evangelista     Sleeping comfortably. Yesterday PT attempted to see pt for PT IE however pt complaining of increased lethargy, and increased RLE weakness. Remove drains today.    Vital Signs Last 24 Hrs  T(C): 36.9 (15 May 2024 05:02), Max: 37.7 (14 May 2024 15:55)  T(F): 98.4 (15 May 2024 05:02), Max: 99.8 (14 May 2024 15:55)  HR: 105 (15 May 2024 05:02) (83 - 105)  BP: 134/88 (15 May 2024 05:02) (108/63 - 138/61)  BP(mean): --  RR: 18 (15 May 2024 05:02) (16 - 20)  SpO2: 96% (15 May 2024 05:02) (94% - 96%)    Parameters below as of 15 May 2024 05:02  Patient On (Oxygen Delivery Method): room air    AVSS, NAD  General: NAD  B/l LE:  Sensation: SILT L2-S1  Motor:   L2 (hip flexion)  R-unable to SLR, L-able to SLR  L3 (knee extension)  R-unable to SLR, L-able to SLR  L4 (ankle dorsiflexion)  5/5   L5 (long toe extension) 5/5   S1 (ankle plantar flexion) 5/5    A/P: 74yFemale s/p L4-L5 TLIF, L4-S1 PSF on 5/13 with Dr. Evangelista.  - Stable, monitor labs/vs/drain output   - Pain Control with ERAS   - DVT ppx: SCDs, LVX to start tomorrow evening per plan   - Carito-op abx: Ancef   - PT, WBS: WBAT   - F/U AM Labs

## 2024-05-15 NOTE — CONSULT NOTE ADULT - TIME BILLING
Bedside exam and interview    Review of hospital course, labs, vitals, imaging ,  medical records.   Reviewing outside records   Discharge planning on Interdisciplinary rounds   Discussion with consultants and Primary team   Documenting the encounter.
Pt started on basal insulin

## 2024-05-15 NOTE — CHART NOTE - NSCHARTNOTEFT_GEN_A_CORE
Serial Abdominal Exam @ 05-15-24 @ 23:28    S: Pt seen and examined at the bedside by surgical team. No acute complaints at this time, sleeping comfortably     O:  T(C): 37.2 (05-15-24 @ 20:45), Max: 37.2 (05-15-24 @ 20:45)  T(F): 98.9 (05-15-24 @ 20:45), Max: 98.9 (05-15-24 @ 20:45)  HR: 118 (05-15-24 @ 20:45) (118 - 118)  BP: 157/67 (05-15-24 @ 20:45) (157/67 - 157/67)  RR: 19 (05-15-24 @ 20:45) (19 - 19)  SpO2: 96% (05-15-24 @ 20:45) (96% - 96%)  Wt(kg): --                        10.7   10.59 )-----------( 277      ( 15 May 2024 13:43 )             32.5     05-15    133<L>  |  100  |  8   ----------------------------<  207<H>  4.4   |  24  |  0.50    Ca    9.9      15 May 2024 13:43        Gen: NAD, resting comfortably in bed  C/V: pulses present and strong in the b/l upper extremities   Pulm: Nonlabored breathing, no respiratory distress  Abd: soft, mildy distended, no tenderness to palpation, no rebound or guarding   Extrem: WWP, no calf edema, SCDs in place     Will continue to monitor.

## 2024-05-15 NOTE — CONSULT NOTE ADULT - ASSESSMENT
74F with PMHx of DMII, HTN, HLD, GERD chronic back pain and PSHx of hysteretomy appendectomy  who presented for elective L4-L5 TLIF and L4-S1 posterior spinal fusion with Dr. Evangelista on 5/13.   POD2 patient developed abominal distention with bilious emesis requiring NGT decompression. CTAP revealing SBO vs. ileus for which sugery was consulted.  Assuming ileus given lack of transition point and smooth taper.    Recommendations:  - No acute surgical intervention at this time  - Agree with NGT decompression   - Maintain NPO  - Avoid narcotics  - Serial abdominal exams  - Surgery Team 4C will continue to follow. Please call with any questions/concerns

## 2024-05-15 NOTE — CONSULT NOTE ADULT - SUBJECTIVE AND OBJECTIVE BOX
HPI:  73 y/o f c/o low back pain x 1 year. States back pain radiates down bilateral legs. States she has numbness to bilateral feet. Uses assistive walking device to help with ambulation Takes tramadol as needed for her pain without relief. Failed conservative therapies for her symptoms. Denies history of blood clot.   Presents today for elective LAMINECTOMY WITH POSTEROLATERAL FUSION L4-L5 with TLIF, RANJITH and REINSTRUMENTATION L4-S1.  (03 May 2024 10:21)      SURGERY ADDENDUM  Patient is a 74F with PMHx of DMII, HTN, HLD, GERD chronic back pain and PSHx of hysteretomy 30 years ago for fibroids, appendectomy in 2012 who presents for elective L4-L5 TLIF and L4-S1 posterior spinal fusion with Dr. Evangelista on 5/13.   Patient was noted to have rigors and tachycardia post operatively w/c/f seritonin syndrome that was ruled out. Lactate was initially as high as 5, but cleared with reuscitation.     This morning patient developed progressive abdominal distention with increased pain. Symptoms were associated with nausea and 2 episdoes of bilious emesis along with tachycardia. Patient reports that she had not had passed flatus since sugery, but last BM was noted to be this AM after a suppository. Reported that she has been tolerating a regular diet until this AM when the nausea and distention began. Denied fevers or chills. Denies issues voiding. States she had never had a bowel obstruction before. Denies family or personal hx of CRC, IBD. Unsure history of colonoscopy.     NGT was placed with over 1.5 L of light bilious output returned immediately and mild resolution of tachycardia. Patient was taken for CTPE and CTAP which r/o PE, but CTAP was consistent with dilated small bowel loops without clear transition point c/f ileus vs. SBO for which surgery was consulted.      PAST MEDICAL & SURGICAL HISTORY:  HTN (hypertension)      Diabetes      HLD (hyperlipidemia)      Colon polyp  SURGERY      Arthritis      GERD (gastroesophageal reflux disease)      Osteoarthritis      H/O urinary incontinence      H/O abdominal hysterectomy  30y ago      History of appendectomy  2012      History of back surgery  2011, lumbar      H/O tubal ligation          MEDICATIONS  (STANDING):  atorvastatin 40 milliGRAM(s) Oral at bedtime  dextrose 10% Bolus 125 milliLiter(s) IV Bolus once  dextrose 5%. 1000 milliLiter(s) (100 mL/Hr) IV Continuous <Continuous>  dextrose 5%. 1000 milliLiter(s) (50 mL/Hr) IV Continuous <Continuous>  dextrose 50% Injectable 25 Gram(s) IV Push once  dextrose 50% Injectable 12.5 Gram(s) IV Push once  enoxaparin Injectable 40 milliGRAM(s) SubCutaneous every 24 hours  gabapentin 600 milliGRAM(s) Oral every 8 hours  glucagon  Injectable 1 milliGRAM(s) IntraMuscular once  insulin lispro (ADMELOG) corrective regimen sliding scale   SubCutaneous Before meals and at bedtime  lactated ringers. 1000 milliLiter(s) (100 mL/Hr) IV Continuous <Continuous>  lisinopril 20 milliGRAM(s) Oral daily  pantoprazole  Injectable 40 milliGRAM(s) IV Push daily  polyethylene glycol 3350 17 Gram(s) Oral at bedtime  senna 2 Tablet(s) Oral at bedtime    MEDICATIONS  (PRN):  acetaminophen   IVPB .. 1000 milliGRAM(s) IV Intermittent once PRN Temp greater or equal to 38C (100.4F), Severe Pain (7 - 10)  aluminum hydroxide/magnesium hydroxide/simethicone Suspension 30 milliLiter(s) Oral every 12 hours PRN Indigestion  benzocaine/menthol Lozenge 1 Lozenge Oral every 2 hours PRN Sore Throat  bisacodyl 5 milliGRAM(s) Oral every 12 hours PRN Constipation  dextrose Oral Gel 15 Gram(s) Oral once PRN Blood Glucose LESS THAN 70 milliGRAM(s)/deciliter  diphenhydrAMINE 25 milliGRAM(s) Oral every 6 hours PRN Rash and/or Itching  HYDROmorphone   Tablet 2 milliGRAM(s) Oral every 4 hours PRN Severe Pain (7 - 10)  magnesium hydroxide Suspension 30 milliLiter(s) Oral every 12 hours PRN Constipation  melatonin 5 milliGRAM(s) Oral at bedtime PRN Insomnia  morphine  - Injectable 2 milliGRAM(s) IV Push every 4 hours PRN Breakthrough  ondansetron Injectable 4 milliGRAM(s) IV Push every 6 hours PRN Nausea and/or Vomiting  SUMAtriptan 50 milliGRAM(s) Oral daily PRN Headache      Allergies    oxycodone (Pruritus)    Intolerances    Dilaudid (Pruritus)      SOCIAL HISTORY:    FAMILY HISTORY:      Vital Signs Last 24 Hrs  T(C): 37.1 (15 May 2024 16:53), Max: 37.6 (15 May 2024 11:02)  T(F): 98.7 (15 May 2024 16:53), Max: 99.7 (15 May 2024 11:02)  HR: 101 (15 May 2024 16:53) (101 - 129)  BP: 116/55 (15 May 2024 16:53) (116/55 - 162/77)  BP(mean): --  RR: 16 (15 May 2024 16:53) (16 - 20)  SpO2: 94% (15 May 2024 16:53) (94% - 96%)    Parameters below as of 15 May 2024 16:53  Patient On (Oxygen Delivery Method): room air      General: Resting in bed, NAD  Neuro: A&Ox3, no focal deficits  CV: Tachycardic - sinus rhythm  Pulm: Equal chest wall expansion b/l, no respiratory distress  Abdomen: Soft, distended, mild tenderness to palpation in upper abdomen with +tympany  Extremities: WWP      LABS:                        10.7   10.59 )-----------( 277      ( 15 May 2024 13:43 )             32.5     05-15    133<L>  |  100  |  8   ----------------------------<  207<H>  4.4   |  24  |  0.50    Ca    9.9      15 May 2024 13:43        Urinalysis Basic - ( 15 May 2024 13:43 )    Color: x / Appearance: x / SG: x / pH: x  Gluc: 207 mg/dL / Ketone: x  / Bili: x / Urobili: x   Blood: x / Protein: x / Nitrite: x   Leuk Esterase: x / RBC: x / WBC x   Sq Epi: x / Non Sq Epi: x / Bacteria: x        RADIOLOGY & ADDITIONAL STUDIES:    CT Abdomen and Pelvis w/ IV Cont:   ACC: [id number] EXAM:  CT ABDOMEN AND PELVIS IC   ORDERED BY: ZAYRA CONN     ACC: [id number] EXAM:  CT ANGIO CHEST PULM ART WAWIC   ORDERED BY: ZAYRA CONN     PROCEDURE DATE:  05/15/2024          INTERPRETATION:  CLINICAL INFORMATION: post op tachycardia, CP, SOB.   Evaluate for obstruction post op vomiting      COMPARISON: None.    CONTRAST/COMPLICATIONS:  IV Contrast: Isovue 370  70 cc administered (accession [id number]), 90 cc   administered (accession [id number])   30 cc discarded (accession [id number]),   10 cc discarded (accession [id number])  Oral Contrast: NONE  Complications: None reported at time of study completion    PROCEDURE:  CT Angiography of the Chest was performed followed by portal venous phase   imaging of the Abdomen and Pelvis.  Sagittal and coronal reformats were performed as well as 3D (MIP)   reconstructions.    FINDINGS:  CHEST:  LUNGS AND LARGE AIRWAYS: Patent central airways. No pulmonary nodules.  Minimal bibasilar subsegmental atelectasis. No focal alveolar   consolidation.  PLEURA: No pleural effusion.  VESSELS: Atherosclerotic vascular calcification thoracic aorta and   coronary arteries. No thoracic aortic aneurysm or dissection. No acute   aortic syndrome. No evidence of pulmonary embolism.  HEART: Heart size is normal. No pericardial effusion.  MEDIASTINUM AND ANA: No lymphadenopathy.  CHEST WALL AND LOWER NECK: Within normal limits.    ABDOMEN AND PELVIS:  LIVER: Steatosis.  BILE DUCTS: Normal caliber.  GALLBLADDER: Within normal limits.  SPLEEN: Within normal limits.  PANCREAS: Within normal limits.  ADRENALS: Within normal limits.  KIDNEYS/URETERS: Within normal limits.    BLADDER: Moderately distended. Small amount of gas in the nondependent   portion of urinary bladder. No intrinsic abnormalities otherwise present..  REPRODUCTIVE ORGANS: Hysterectomy.    Nasogastric tube in place with distal tip in the stomach. Moderate   gastric distention with debris and fluid. No gastric outlet obstruction.    BOWEL: Mildly distended fluid-filled small bowel within the central and   left side of the abdomen. Ileus versus partial or incomplete obstruction   could have this appearance. No evidence of transition point. No   high-grade bowel obstruction present at this time.    Appendix prior appendectomy  PERITONEUM: No ascites.  VESSELS: Atherosclerotic changes.  No free or abscess.  RETROPERITONEUM/LYMPH NODES: No lymphadenopathy.  ABDOMINAL WALL: Within normal limits.  BONES: Degenerative changes. No lytic or blastic process. Postsurgical   changes L4, L5 and S1. Bilateral posterior fixation screws in place.   Laminectomy at L5. Disc space appliances L4-L5 and L5-S1. Small amounts   of gas within the paraspinal soft tissues consistent with postsurgical   changes. No focal fluid collection or abscess. Two small surgical drains   in place.    IMPRESSION:  No evidence of pulmonary embolism.    Nasogastric tube in place with distal tip in the low stomach. Moderate   gastric distention with debris and fluid. No gastric outlet obstruction    Mildly distended fluid-filled small bowel within the central and left   side of the abdomen. Ileus versus partial or incomplete obstruction could   have this appearance. No evidence of transition point. No high-grade   bowel obstruction present at this time. Further evaluation and follow-up   recommended as clinically indicated.    Postsurgical changes L4, L5 and S1. Small amounts of gas within the   paraspinal soft tissues consistent with postsurgical changes. No focal   fluid collection or abscess.    Please refer to detailed findings otherwise described above.    --- End of Report ---            MICHELLE DELGADO MD; Attending Radiologist  This document has been electronically signed. May 15 2024  5:33PM (05-15-24 @ 16:50)

## 2024-05-16 LAB
ANION GAP SERPL CALC-SCNC: 10 MMOL/L — SIGNIFICANT CHANGE UP (ref 5–17)
BASOPHILS # BLD AUTO: 0 K/UL — SIGNIFICANT CHANGE UP (ref 0–0.2)
BASOPHILS NFR BLD AUTO: 0 % — SIGNIFICANT CHANGE UP (ref 0–2)
BUN SERPL-MCNC: 12 MG/DL — SIGNIFICANT CHANGE UP (ref 7–23)
CALCIUM SERPL-MCNC: 8.9 MG/DL — SIGNIFICANT CHANGE UP (ref 8.4–10.5)
CHLORIDE SERPL-SCNC: 105 MMOL/L — SIGNIFICANT CHANGE UP (ref 96–108)
CO2 SERPL-SCNC: 24 MMOL/L — SIGNIFICANT CHANGE UP (ref 22–31)
CREAT SERPL-MCNC: 0.62 MG/DL — SIGNIFICANT CHANGE UP (ref 0.5–1.3)
EGFR: 93 ML/MIN/1.73M2 — SIGNIFICANT CHANGE UP
EOSINOPHIL # BLD AUTO: 0 K/UL — SIGNIFICANT CHANGE UP (ref 0–0.5)
EOSINOPHIL NFR BLD AUTO: 0 % — SIGNIFICANT CHANGE UP (ref 0–6)
GLUCOSE BLDC GLUCOMTR-MCNC: 129 MG/DL — HIGH (ref 70–99)
GLUCOSE BLDC GLUCOMTR-MCNC: 138 MG/DL — HIGH (ref 70–99)
GLUCOSE BLDC GLUCOMTR-MCNC: 161 MG/DL — HIGH (ref 70–99)
GLUCOSE SERPL-MCNC: 149 MG/DL — HIGH (ref 70–99)
HCT VFR BLD CALC: 26.5 % — LOW (ref 34.5–45)
HGB BLD-MCNC: 8.7 G/DL — LOW (ref 11.5–15.5)
LYMPHOCYTES # BLD AUTO: 0.98 K/UL — LOW (ref 1–3.3)
LYMPHOCYTES # BLD AUTO: 16.1 % — SIGNIFICANT CHANGE UP (ref 13–44)
MAGNESIUM SERPL-MCNC: 1.9 MG/DL — SIGNIFICANT CHANGE UP (ref 1.6–2.6)
MANUAL SMEAR VERIFICATION: SIGNIFICANT CHANGE UP
MCHC RBC-ENTMCNC: 31.1 PG — SIGNIFICANT CHANGE UP (ref 27–34)
MCHC RBC-ENTMCNC: 32.8 GM/DL — SIGNIFICANT CHANGE UP (ref 32–36)
MCV RBC AUTO: 94.6 FL — SIGNIFICANT CHANGE UP (ref 80–100)
MONOCYTES # BLD AUTO: 0.72 K/UL — SIGNIFICANT CHANGE UP (ref 0–0.9)
MONOCYTES NFR BLD AUTO: 11.8 % — SIGNIFICANT CHANGE UP (ref 2–14)
NEUTROPHILS # BLD AUTO: 4.38 K/UL — SIGNIFICANT CHANGE UP (ref 1.8–7.4)
NEUTROPHILS NFR BLD AUTO: 72.1 % — SIGNIFICANT CHANGE UP (ref 43–77)
OVALOCYTES BLD QL SMEAR: SLIGHT — SIGNIFICANT CHANGE UP
PHOSPHATE SERPL-MCNC: 2.2 MG/DL — LOW (ref 2.5–4.5)
PLAT MORPH BLD: NORMAL — SIGNIFICANT CHANGE UP
PLATELET # BLD AUTO: 276 K/UL — SIGNIFICANT CHANGE UP (ref 150–400)
POIKILOCYTOSIS BLD QL AUTO: SLIGHT — SIGNIFICANT CHANGE UP
POLYCHROMASIA BLD QL SMEAR: SLIGHT — SIGNIFICANT CHANGE UP
POTASSIUM SERPL-MCNC: 4.1 MMOL/L — SIGNIFICANT CHANGE UP (ref 3.5–5.3)
POTASSIUM SERPL-SCNC: 4.1 MMOL/L — SIGNIFICANT CHANGE UP (ref 3.5–5.3)
RBC # BLD: 2.8 M/UL — LOW (ref 3.8–5.2)
RBC # FLD: 14.2 % — SIGNIFICANT CHANGE UP (ref 10.3–14.5)
RBC BLD AUTO: ABNORMAL
SCHISTOCYTES BLD QL AUTO: SLIGHT — SIGNIFICANT CHANGE UP
SODIUM SERPL-SCNC: 139 MMOL/L — SIGNIFICANT CHANGE UP (ref 135–145)
SPHEROCYTES BLD QL SMEAR: SLIGHT — SIGNIFICANT CHANGE UP
WBC # BLD: 6.07 K/UL — SIGNIFICANT CHANGE UP (ref 3.8–10.5)
WBC # FLD AUTO: 6.07 K/UL — SIGNIFICANT CHANGE UP (ref 3.8–10.5)

## 2024-05-16 PROCEDURE — 99233 SBSQ HOSP IP/OBS HIGH 50: CPT

## 2024-05-16 PROCEDURE — 74018 RADEX ABDOMEN 1 VIEW: CPT | Mod: 26

## 2024-05-16 PROCEDURE — 99231 SBSQ HOSP IP/OBS SF/LOW 25: CPT | Mod: GC

## 2024-05-16 RX ORDER — MORPHINE SULFATE 50 MG/1
2 CAPSULE, EXTENDED RELEASE ORAL EVERY 4 HOURS
Refills: 0 | Status: DISCONTINUED | OUTPATIENT
Start: 2024-05-16 | End: 2024-05-19

## 2024-05-16 RX ORDER — INSULIN GLARGINE 100 [IU]/ML
10 INJECTION, SOLUTION SUBCUTANEOUS AT BEDTIME
Refills: 0 | Status: DISCONTINUED | OUTPATIENT
Start: 2024-05-16 | End: 2024-05-16

## 2024-05-16 RX ORDER — INSULIN GLARGINE 100 [IU]/ML
10 INJECTION, SOLUTION SUBCUTANEOUS AT BEDTIME
Refills: 0 | Status: DISCONTINUED | OUTPATIENT
Start: 2024-05-16 | End: 2024-05-17

## 2024-05-16 RX ADMIN — MORPHINE SULFATE 2 MILLIGRAM(S): 50 CAPSULE, EXTENDED RELEASE ORAL at 21:00

## 2024-05-16 RX ADMIN — Medication 400 MILLIGRAM(S): at 07:31

## 2024-05-16 RX ADMIN — Medication 1000 MILLIGRAM(S): at 08:31

## 2024-05-16 RX ADMIN — ENOXAPARIN SODIUM 40 MILLIGRAM(S): 100 INJECTION SUBCUTANEOUS at 21:00

## 2024-05-16 RX ADMIN — SODIUM CHLORIDE 100 MILLILITER(S): 9 INJECTION, SOLUTION INTRAVENOUS at 12:13

## 2024-05-16 RX ADMIN — MORPHINE SULFATE 2 MILLIGRAM(S): 50 CAPSULE, EXTENDED RELEASE ORAL at 16:41

## 2024-05-16 RX ADMIN — INSULIN GLARGINE 10 UNIT(S): 100 INJECTION, SOLUTION SUBCUTANEOUS at 22:32

## 2024-05-16 RX ADMIN — PANTOPRAZOLE SODIUM 40 MILLIGRAM(S): 20 TABLET, DELAYED RELEASE ORAL at 12:13

## 2024-05-16 RX ADMIN — LISINOPRIL 20 MILLIGRAM(S): 2.5 TABLET ORAL at 05:52

## 2024-05-16 RX ADMIN — Medication 50 MILLIGRAM(S): at 22:33

## 2024-05-16 RX ADMIN — MORPHINE SULFATE 2 MILLIGRAM(S): 50 CAPSULE, EXTENDED RELEASE ORAL at 12:33

## 2024-05-16 RX ADMIN — GABAPENTIN 600 MILLIGRAM(S): 400 CAPSULE ORAL at 05:52

## 2024-05-16 RX ADMIN — MORPHINE SULFATE 2 MILLIGRAM(S): 50 CAPSULE, EXTENDED RELEASE ORAL at 16:21

## 2024-05-16 RX ADMIN — MORPHINE SULFATE 2 MILLIGRAM(S): 50 CAPSULE, EXTENDED RELEASE ORAL at 12:13

## 2024-05-16 RX ADMIN — Medication 2: at 07:45

## 2024-05-16 RX ADMIN — MORPHINE SULFATE 2 MILLIGRAM(S): 50 CAPSULE, EXTENDED RELEASE ORAL at 21:50

## 2024-05-16 NOTE — PHYSICAL THERAPY INITIAL EVALUATION ADULT - GROSSLY INTACT, SENSORY
Grossly intact to light touch sensation throughout BLE and BUE. Pt reports numbness along R toes however states improving since previous days.

## 2024-05-16 NOTE — PROGRESS NOTE ADULT - SUBJECTIVE AND OBJECTIVE BOX
Orthopedics Floor Note    Procedure: L4-L5 TLIF, L4-S1 PSF   Surgeon: Dr Evangelista     Pt stepped up from regional to tele for vomiting, tachycardia, O2 desat. CT PE negative. CT abdomen ileus vs partial SBO, NG tube placed. Says she is feeling slightly better this morning.    Vital Signs Last 24 Hrs  T(C): 37.3 (16 May 2024 12:46), Max: 37.7 (16 May 2024 05:50)  T(F): 99.2 (16 May 2024 12:46), Max: 99.8 (16 May 2024 05:50)  HR: 90 (16 May 2024 12:46) (90 - 129)  BP: 123/69 (16 May 2024 12:46) (116/55 - 157/67)  BP(mean): --  RR: 19 (16 May 2024 12:46) (16 - 20)  SpO2: 96% (16 May 2024 12:46) (93% - 98%)    Parameters below as of 16 May 2024 12:46  Patient On (Oxygen Delivery Method): room air    AVSS, NAD  General: NAD  B/l LE:  Sensation: SILT L2-S1  Motor:   L2 (hip flexion)  R-unable to SLR, L-able to SLR  L3 (knee extension)  R-unable to SLR, L-able to SLR  L4 (ankle dorsiflexion)  5/5   L5 (long toe extension) 5/5   S1 (ankle plantar flexion) 5/5    A/P: 74yFemale s/p L4-L5 TLIF, L4-S1 PSF on 5/13 with Dr. Evangelista.  - Stable, monitor labs/vs  - Pain Control with ERAS   - DVT ppx: SCDs, LVX  - Carito-op abx: Ancef   - PT, WBS: WBAT   - F/U AM Labs

## 2024-05-16 NOTE — PROGRESS NOTE ADULT - ASSESSMENT
74F with PMHx of DMII, HTN, HLD, GERD chronic back pain and PSHx of hysteretomy appendectomy  who presented for elective L4-L5 TLIF and L4-S1 posterior spinal fusion with Dr. Evangelista on 5/13.   POD2 patient developed abominal distention with bilious emesis requiring NGT decompression. CTAP revealing SBO vs. ileus for which zac was consulted.  Assuming ileus given lack of transition point and smooth taper.    Recommendations:  - No acute surgical intervention at this time  - Continue NGT decompression   - Maintain NPO until having regular bowel function   - Avoid narcotics  - Please correct electrolyte abnormalities   - Serial abdominal exams  - Surgery Team 4C will continue to follow. Please call with any questions/concerns

## 2024-05-16 NOTE — PROGRESS NOTE ADULT - SUBJECTIVE AND OBJECTIVE BOX
Ortho Note    Subjective:  Patient seen and examined today   Pt reporting surgical site pain and abdominal pain, improved today  Denies CP, SOB, N/V, patient reports tingling and numbness to the base of her bilateral feet  Reviewed plan of care with patient at bedside    Vital Signs Last 24 Hrs  T(C): --  T(F): --  HR: 91 (05-16-24 @ 08:50) (91 - 91)  BP: 119/58 (05-16-24 @ 08:50) (119/58 - 119/58)  BP(mean): --  RR: 18 (05-16-24 @ 08:50) (18 - 18)  SpO2: 96% (05-16-24 @ 08:50) (96% - 96%)  AVSS    Objective:    Physical Exam:  General: Pt Alert and oriented, NAD  Lumbar DSG C/D/I hv x2  Pulses: +2 pedal pulses, wwp toes  Sensation: silt intact bilateral lower extremities  Motor: EHL/FHL/TA/GS- 5/5 bilateral lower extremities  abdomen distended, soft to touch, NGT to LWS      Plan of Care:  A/P: 74yFemale POD#3 s/p L4-L5 TLIF, L4-S1   - afebrile, wbcs 6.07  - Pain Control- morphine 2mg IVP q4h prn severe pain while NPO   - DVT ppx: Lovenox 40mg Subq Q24h   - PT, WBS: WBAT  - NTG TO LWS -   - appreciate medicine recs  - appreciate surgery recs- abdominal xray today   - continue HV x2  - Dispo- home pending medical clearance, pt clearance and dc of drains     Ortho Pager [phone]

## 2024-05-16 NOTE — PHYSICAL THERAPY INITIAL EVALUATION ADULT - NSPTDISCHREC_GEN_A_CORE
However should pt go home, pt would highly benefit from HPT, and 24/7 person assist. Pt owns RW and SC./Sub-acute Rehab However should pt go home, pt would highly benefit from HPT, and 24/7 person assist and shower chair. Pt owns RW and SC./Sub-acute Rehab

## 2024-05-16 NOTE — PROGRESS NOTE ADULT - SUBJECTIVE AND OBJECTIVE BOX
SUBJECTIVE:  This morning, she feels well; her pain is well-controlled. No nausea or vomiting.   Not passing flatus and having BMs.   No acute complaints.    MEDICATIONS  (STANDING):  atorvastatin 40 milliGRAM(s) Oral at bedtime  dextrose 10% Bolus 125 milliLiter(s) IV Bolus once  dextrose 5%. 1000 milliLiter(s) (100 mL/Hr) IV Continuous <Continuous>  dextrose 5%. 1000 milliLiter(s) (50 mL/Hr) IV Continuous <Continuous>  dextrose 50% Injectable 25 Gram(s) IV Push once  dextrose 50% Injectable 12.5 Gram(s) IV Push once  enoxaparin Injectable 40 milliGRAM(s) SubCutaneous every 24 hours  gabapentin 600 milliGRAM(s) Oral every 8 hours  glucagon  Injectable 1 milliGRAM(s) IntraMuscular once  insulin glargine Injectable (LANTUS) 10 Unit(s) SubCutaneous at bedtime  insulin lispro (ADMELOG) corrective regimen sliding scale   SubCutaneous Before meals and at bedtime  lactated ringers. 1000 milliLiter(s) (100 mL/Hr) IV Continuous <Continuous>  lisinopril 20 milliGRAM(s) Oral daily  pantoprazole  Injectable 40 milliGRAM(s) IV Push daily  polyethylene glycol 3350 17 Gram(s) Oral at bedtime  senna 2 Tablet(s) Oral at bedtime    MEDICATIONS  (PRN):  aluminum hydroxide/magnesium hydroxide/simethicone Suspension 30 milliLiter(s) Oral every 12 hours PRN Indigestion  benzocaine/menthol Lozenge 1 Lozenge Oral every 2 hours PRN Sore Throat  bisacodyl 5 milliGRAM(s) Oral every 12 hours PRN Constipation  dextrose Oral Gel 15 Gram(s) Oral once PRN Blood Glucose LESS THAN 70 milliGRAM(s)/deciliter  diphenhydrAMINE 25 milliGRAM(s) Oral every 6 hours PRN Rash and/or Itching  diphenhydrAMINE Injectable 50 milliGRAM(s) IV Push at bedtime PRN Insomnia  HYDROmorphone   Tablet 2 milliGRAM(s) Oral every 4 hours PRN Severe Pain (7 - 10)  magnesium hydroxide Suspension 30 milliLiter(s) Oral every 12 hours PRN Constipation  melatonin 5 milliGRAM(s) Oral at bedtime PRN Insomnia  morphine  - Injectable 2 milliGRAM(s) IV Push every 4 hours PRN Severe Pain (7 - 10)  ondansetron Injectable 4 milliGRAM(s) IV Push every 6 hours PRN Nausea and/or Vomiting  SUMAtriptan 50 milliGRAM(s) Oral daily PRN Headache      Vital Signs Last 24 Hrs  T(C): 37.7 (16 May 2024 05:50), Max: 37.7 (16 May 2024 05:50)  T(F): 99.8 (16 May 2024 05:50), Max: 99.8 (16 May 2024 05:50)  HR: 91 (16 May 2024 08:50) (91 - 129)  BP: 119/58 (16 May 2024 08:50) (116/55 - 162/77)  BP(mean): --  RR: 18 (16 May 2024 08:50) (16 - 20)  SpO2: 96% (16 May 2024 08:50) (93% - 98%)    Parameters below as of 16 May 2024 08:50  Patient On (Oxygen Delivery Method): room air        Physical Exam:  General: NAD, resting comfortably in bed  Pulmonary: Nonlabored breathing, no respiratory distress  Cardiovascular: NSR  Abdominal: soft, moderately distended/ND  Extremities: WWP, normal strength  Neuro: A/O x 3, CNs II-XII grossly intact, no focal deficits    I&O's Summary    15 May 2024 07:01  -  16 May 2024 07:00  --------------------------------------------------------  IN: 1400 mL / OUT: 2559 mL / NET: -1159 mL        LABS:                        8.7    6.07  )-----------( 276      ( 16 May 2024 05:30 )             26.5     05-16    139  |  105  |  12  ----------------------------<  149<H>  4.1   |  24  |  0.62    Ca    8.9      16 May 2024 05:30  Phos  2.2     05-16  Mg     1.9     05-16        Urinalysis Basic - ( 16 May 2024 05:30 )    Color: x / Appearance: x / SG: x / pH: x  Gluc: 149 mg/dL / Ketone: x  / Bili: x / Urobili: x   Blood: x / Protein: x / Nitrite: x   Leuk Esterase: x / RBC: x / WBC x   Sq Epi: x / Non Sq Epi: x / Bacteria: x      CAPILLARY BLOOD GLUCOSE      POCT Blood Glucose.: 161 mg/dL (16 May 2024 06:55)  POCT Blood Glucose.: 173 mg/dL (15 May 2024 22:51)  POCT Blood Glucose.: 209 mg/dL (15 May 2024 16:55)        RADIOLOGY & ADDITIONAL STUDIES:

## 2024-05-16 NOTE — PHYSICAL THERAPY INITIAL EVALUATION ADULT - GAIT DEVIATIONS NOTED, PT EVAL
Pt w/ dec weight shifting abilities/slightly unsteady gait however no LOB/falls or knee buckling observed. Further amb distance deferred 2/2 complaints of fatigue. No acute distress noted./decreased bhupinder/decreased velocity of limb motion/decreased weight-shifting ability

## 2024-05-16 NOTE — PROGRESS NOTE ADULT - ASSESSMENT
Patient is a 74y old  Female who presents with a chief complaint of low back pain (14 May 2024 14:26)      Impression and Plan   # Low back pain S/p  L4-L5 TLIF, L4-S1 PSF on 5/13/24  - pain control , DVT prophylaxis , Weightbearing status , Dressing changes and drain care per ortho team    # Ileus  - NG Tube   - IV LR at 100ml/hr   - Monitor BMP , Check lactate   - CT abd and pelvis   - NPO     # Chest pain   - EKG iwth Sinus Tachy,  rate related ST-T depression   - CTA negative for pneumonia and PE     # Tremulousness - resolved   - ? myoclonus   - Likely complication of medication  ( amitriptyline , Methocarbamol , escitalopram , tramadol ? Anaesthetics   - on exam does not have ankle clonus   -  has some meds that can cause Serotonin syndrome , however does not meet any of the components of jayne  criteria   - hold amitriptyline , tramadol     # Acute Blood Loss Anemia   - Transfuse if HGB < 7gm/dl   - Can check iron Studies    # Diabetes  - Lantus was recommended on 5/14 , not started   - on glimepiride and metformin at home , currently on hold   - sliding scale insulin   - F/u Endocrine recs     # HTN   - hold Lisinopril while NPO     # HLD   -Atorvastatin     #DVT PPx - Per Ortho

## 2024-05-16 NOTE — PROGRESS NOTE ADULT - SUBJECTIVE AND OBJECTIVE BOX
INTERVAL HPI/OVERNIGHT EVENTS:    MEDICATIONS  (STANDING):  atorvastatin 40 milliGRAM(s) Oral at bedtime  dextrose 10% Bolus 125 milliLiter(s) IV Bolus once  dextrose 5%. 1000 milliLiter(s) (100 mL/Hr) IV Continuous <Continuous>  dextrose 5%. 1000 milliLiter(s) (50 mL/Hr) IV Continuous <Continuous>  dextrose 50% Injectable 25 Gram(s) IV Push once  dextrose 50% Injectable 12.5 Gram(s) IV Push once  enoxaparin Injectable 40 milliGRAM(s) SubCutaneous every 24 hours  gabapentin 600 milliGRAM(s) Oral every 8 hours  glucagon  Injectable 1 milliGRAM(s) IntraMuscular once  insulin glargine Injectable (LANTUS) 10 Unit(s) SubCutaneous at bedtime  insulin lispro (ADMELOG) corrective regimen sliding scale   SubCutaneous Before meals and at bedtime  lactated ringers. 1000 milliLiter(s) (100 mL/Hr) IV Continuous <Continuous>  lisinopril 20 milliGRAM(s) Oral daily  pantoprazole  Injectable 40 milliGRAM(s) IV Push daily  polyethylene glycol 3350 17 Gram(s) Oral at bedtime  senna 2 Tablet(s) Oral at bedtime    MEDICATIONS  (PRN):  aluminum hydroxide/magnesium hydroxide/simethicone Suspension 30 milliLiter(s) Oral every 12 hours PRN Indigestion  benzocaine/menthol Lozenge 1 Lozenge Oral every 2 hours PRN Sore Throat  bisacodyl 5 milliGRAM(s) Oral every 12 hours PRN Constipation  dextrose Oral Gel 15 Gram(s) Oral once PRN Blood Glucose LESS THAN 70 milliGRAM(s)/deciliter  diphenhydrAMINE 25 milliGRAM(s) Oral every 6 hours PRN Rash and/or Itching  diphenhydrAMINE Injectable 50 milliGRAM(s) IV Push at bedtime PRN Insomnia  HYDROmorphone   Tablet 2 milliGRAM(s) Oral every 4 hours PRN Severe Pain (7 - 10)  magnesium hydroxide Suspension 30 milliLiter(s) Oral every 12 hours PRN Constipation  melatonin 5 milliGRAM(s) Oral at bedtime PRN Insomnia  morphine  - Injectable 2 milliGRAM(s) IV Push every 4 hours PRN Breakthrough  ondansetron Injectable 4 milliGRAM(s) IV Push every 6 hours PRN Nausea and/or Vomiting  SUMAtriptan 50 milliGRAM(s) Oral daily PRN Headache      Allergies    oxycodone (Pruritus)    Intolerances    Dilaudid (Pruritus)      Vital Signs Last 24 Hrs  T(C): 37.7 (16 May 2024 05:50), Max: 37.7 (16 May 2024 05:50)  T(F): 99.8 (16 May 2024 05:50), Max: 99.8 (16 May 2024 05:50)  HR: 91 (16 May 2024 08:50) (91 - 129)  BP: 119/58 (16 May 2024 08:50) (116/55 - 162/77)  BP(mean): --  RR: 18 (16 May 2024 08:50) (16 - 20)  SpO2: 96% (16 May 2024 08:50) (93% - 98%)    Parameters below as of 16 May 2024 08:50  Patient On (Oxygen Delivery Method): room air      VITAL SIGNS:  T(C): 37.7 (05-16-24 @ 05:50), Max: 37.7 (05-16-24 @ 05:50)  T(F): 99.8 (05-16-24 @ 05:50), Max: 99.8 (05-16-24 @ 05:50)  HR: 91 (05-16-24 @ 08:50) (91 - 129)  BP: 119/58 (05-16-24 @ 08:50) (116/55 - 162/77)  BP(mean): --  RR: 18 (05-16-24 @ 08:50) (16 - 20)  SpO2: 96% (05-16-24 @ 08:50) (93% - 98%)  Wt(kg): --    PHYSICAL EXAM:    Constitutional: NAD  Head: NC/AT  Eyes: PERRL, EOMI, anicteric sclera  ENT: no nasal discharge; uvula midline, no oropharyngeal erythema or exudates; MMM  Neck: supple; no JVD or thyromegaly  Respiratory: CTA B/L; no W/R/R, no retractions  Cardiac: +S1/S2; RRR; no M/R/G; PMI non-displaced  Gastrointestinal: abdomen soft, NT, mildly distended; no rebound or guarding; +BSx4  Extremities: WWP, no clubbing or cyanosis; no peripheral edema  Musculoskeletal: NROM x4; no joint swelling, tenderness or erythema  Vascular: 2+ radial, femoral, DP/PT pulses B/L  Dermatologic: skin warm, dry and intact; no rashes, wounds, or scars  Neurologic: AAOx3; CNII-XII grossly intact; no focal deficits        LABS:                        8.7    6.07  )-----------( 276      ( 16 May 2024 05:30 )             26.5     05-16    139  |  105  |  12  ----------------------------<  149<H>  4.1   |  24  |  0.62    Ca    8.9      16 May 2024 05:30  Phos  2.2     05-16  Mg     1.9     05-16      CAPILLARY BLOOD GLUCOSE      POCT Blood Glucose.: 161 mg/dL (16 May 2024 06:55)  POCT Blood Glucose.: 173 mg/dL (15 May 2024 22:51)  POCT Blood Glucose.: 209 mg/dL (15 May 2024 16:55)  POCT Blood Glucose.: 203 mg/dL (15 May 2024 11:55)      Urinalysis Basic - ( 16 May 2024 05:30 )    Color: x / Appearance: x / SG: x / pH: x  Gluc: 149 mg/dL / Ketone: x  / Bili: x / Urobili: x   Blood: x / Protein: x / Nitrite: x   Leuk Esterase: x / RBC: x / WBC x   Sq Epi: x / Non Sq Epi: x / Bacteria: x        RADIOLOGY & ADDITIONAL TESTS:   INTERVAL HPI/OVERNIGHT EVENTS: Patient still NPO. She has not had a bowel movement since yesterday. She reports flatulence. Denies vomiting. She reports improvement of her abdominal distension.     MEDICATIONS  (STANDING):  atorvastatin 40 milliGRAM(s) Oral at bedtime  dextrose 10% Bolus 125 milliLiter(s) IV Bolus once  dextrose 5%. 1000 milliLiter(s) (100 mL/Hr) IV Continuous <Continuous>  dextrose 5%. 1000 milliLiter(s) (50 mL/Hr) IV Continuous <Continuous>  dextrose 50% Injectable 25 Gram(s) IV Push once  dextrose 50% Injectable 12.5 Gram(s) IV Push once  enoxaparin Injectable 40 milliGRAM(s) SubCutaneous every 24 hours  gabapentin 600 milliGRAM(s) Oral every 8 hours  glucagon  Injectable 1 milliGRAM(s) IntraMuscular once  insulin glargine Injectable (LANTUS) 10 Unit(s) SubCutaneous at bedtime  insulin lispro (ADMELOG) corrective regimen sliding scale   SubCutaneous Before meals and at bedtime  lactated ringers. 1000 milliLiter(s) (100 mL/Hr) IV Continuous <Continuous>  lisinopril 20 milliGRAM(s) Oral daily  pantoprazole  Injectable 40 milliGRAM(s) IV Push daily  polyethylene glycol 3350 17 Gram(s) Oral at bedtime  senna 2 Tablet(s) Oral at bedtime    MEDICATIONS  (PRN):  aluminum hydroxide/magnesium hydroxide/simethicone Suspension 30 milliLiter(s) Oral every 12 hours PRN Indigestion  benzocaine/menthol Lozenge 1 Lozenge Oral every 2 hours PRN Sore Throat  bisacodyl 5 milliGRAM(s) Oral every 12 hours PRN Constipation  dextrose Oral Gel 15 Gram(s) Oral once PRN Blood Glucose LESS THAN 70 milliGRAM(s)/deciliter  diphenhydrAMINE 25 milliGRAM(s) Oral every 6 hours PRN Rash and/or Itching  diphenhydrAMINE Injectable 50 milliGRAM(s) IV Push at bedtime PRN Insomnia  HYDROmorphone   Tablet 2 milliGRAM(s) Oral every 4 hours PRN Severe Pain (7 - 10)  magnesium hydroxide Suspension 30 milliLiter(s) Oral every 12 hours PRN Constipation  melatonin 5 milliGRAM(s) Oral at bedtime PRN Insomnia  morphine  - Injectable 2 milliGRAM(s) IV Push every 4 hours PRN Breakthrough  ondansetron Injectable 4 milliGRAM(s) IV Push every 6 hours PRN Nausea and/or Vomiting  SUMAtriptan 50 milliGRAM(s) Oral daily PRN Headache      Allergies    oxycodone (Pruritus)    Intolerances    Dilaudid (Pruritus)      Vital Signs Last 24 Hrs  T(C): 37.7 (16 May 2024 05:50), Max: 37.7 (16 May 2024 05:50)  T(F): 99.8 (16 May 2024 05:50), Max: 99.8 (16 May 2024 05:50)  HR: 91 (16 May 2024 08:50) (91 - 129)  BP: 119/58 (16 May 2024 08:50) (116/55 - 162/77)  BP(mean): --  RR: 18 (16 May 2024 08:50) (16 - 20)  SpO2: 96% (16 May 2024 08:50) (93% - 98%)    Parameters below as of 16 May 2024 08:50  Patient On (Oxygen Delivery Method): room air      VITAL SIGNS:  T(C): 37.7 (05-16-24 @ 05:50), Max: 37.7 (05-16-24 @ 05:50)  T(F): 99.8 (05-16-24 @ 05:50), Max: 99.8 (05-16-24 @ 05:50)  HR: 91 (05-16-24 @ 08:50) (91 - 129)  BP: 119/58 (05-16-24 @ 08:50) (116/55 - 162/77)  BP(mean): --  RR: 18 (05-16-24 @ 08:50) (16 - 20)  SpO2: 96% (05-16-24 @ 08:50) (93% - 98%)  Wt(kg): --    PHYSICAL EXAM:    Constitutional: NAD  Head: NC/AT  Eyes: PERRL, EOMI, anicteric sclera  ENT: no nasal discharge; uvula midline, no oropharyngeal erythema or exudates; MMM  Neck: supple; no JVD or thyromegaly  Respiratory: CTA B/L; no W/R/R, no retractions  Cardiac: +S1/S2; RRR; no M/R/G; PMI non-displaced  Gastrointestinal: abdomen soft, NT, mildly distended; no rebound or guarding; +BSx4  Extremities: WWP, no clubbing or cyanosis; no peripheral edema  Musculoskeletal: NROM x4; no joint swelling, tenderness or erythema  Vascular: 2+ radial, femoral, DP/PT pulses B/L  Dermatologic: skin warm, dry and intact; no rashes, wounds, or scars  Neurologic: AAOx3; CNII-XII grossly intact; no focal deficits        LABS:                        8.7    6.07  )-----------( 276      ( 16 May 2024 05:30 )             26.5     05-16    139  |  105  |  12  ----------------------------<  149<H>  4.1   |  24  |  0.62    Ca    8.9      16 May 2024 05:30  Phos  2.2     05-16  Mg     1.9     05-16      CAPILLARY BLOOD GLUCOSE      POCT Blood Glucose.: 161 mg/dL (16 May 2024 06:55)  POCT Blood Glucose.: 173 mg/dL (15 May 2024 22:51)  POCT Blood Glucose.: 209 mg/dL (15 May 2024 16:55)  POCT Blood Glucose.: 203 mg/dL (15 May 2024 11:55)      Urinalysis Basic - ( 16 May 2024 05:30 )    Color: x / Appearance: x / SG: x / pH: x  Gluc: 149 mg/dL / Ketone: x  / Bili: x / Urobili: x   Blood: x / Protein: x / Nitrite: x   Leuk Esterase: x / RBC: x / WBC x   Sq Epi: x / Non Sq Epi: x / Bacteria: x        RADIOLOGY & ADDITIONAL TESTS:

## 2024-05-16 NOTE — PHYSICAL THERAPY INITIAL EVALUATION ADULT - ADDITIONAL COMMENTS
Constitutional: denies fever, chills, diaphoresis   HEENT: denies blurry vision, difficulty hearing  Respiratory: denies SOB, BAHENA, cough, sputum production, wheezing, hemoptysis  Cardiovascular: denies CP, palpitations, edema  Gastrointestinal: Admits to abdominal pain, and some nausea. denies vomiting, diarrhea, constipation, abdominal pain, melena, hematochezia   Genitourinary: denies dysuria, frequency, urgency, hematuria   Musculoskeletal: denies myalgias, joint swelling, muscle weakness  Neurologic: denies headache, weakness, dizziness, paresthesias, numbness/tingling Pt currently resides in private home, 4 ANAL[address]. Primarily amb w/ RW or SC. Experienced multiple falls >15 within past 6 months, w/ inability to self-recover requiring assist from Police to stand back up. Pt reports being independent w/ showering and dressing tasks. Granddaughter will be staying w/ patient for ~3 weeks upon [facility] discharge.

## 2024-05-16 NOTE — PROGRESS NOTE ADULT - ASSESSMENT
A/P:  74 year old Female with a past medical history of hypertension, diabetes, hyperlipidemia, arthritis, gastroesophageal reflux disease, and osteoarthritis, admitted for elective laminectomy with posterolateral fusion L4-L5 with tlif, margo and reinstrumentation L4-S1 and POD2. Endocrinology consulted for management of diabetes. Course c/b post-op ileus and most likely opioid-induced constipation    Noted with A1c 7.5  Weight: 65.4 Kg  BUN 8    1.  T2DM  - Please start lantus 10U at bedtime  - Please verify fingersticks q6h with mISS while NPO  - Continue LR 100cc/h    2. Post-op ileus/opioid-induced constipation  - Continue miralax at bedtime  - Start senna and dulcolax BID  - Give bisacodyl suppository 10 mg HI once   - Limit use of opioids and initiate other non-opioid pain regimens, consider pain management consult   - Ensure OOBTC  - Defer rest of management to Medicine consult service     Will continue to monitor     Pt can follow up at discharge with [facility] by calling  to make an appointment.     Discussed with Dr Alvarez.  A/P:  74 year old Female with a past medical history of hypertension, diabetes, hyperlipidemia, arthritis, gastroesophageal reflux disease, and osteoarthritis, admitted for elective laminectomy with posterolateral fusion L4-L5 with tlif, margo and reinstrumentation L4-S1 and POD2. Endocrinology consulted for management of diabetes. Course c/b post-op ileus and most likely opioid-induced constipation    Noted with A1c 7.5  Weight: 65.4 Kg  BUN 8    1.  T2DM  - Please start lantus 10U at bedtime  - Please verify fingersticks q6h with mISS while NPO  - Continue LR 100cc/h    2. Post-op ileus/opioid-induced constipation  - Continue miralax at bedtime  - Start senna and dulcolax BID  - Give bisacodyl suppository 10 mg GA once   - Keep NPO for now   - Limit use of opioids and initiate other non-opioid pain regimens, consider pain management consult   - Ensure OOBTC  - Defer rest of management to Medicine consult service     Will continue to monitor     Pt can follow up at discharge with [facility] by calling  to make an appointment.     Discussed with Dr Alvarez.

## 2024-05-16 NOTE — PROGRESS NOTE ADULT - SUBJECTIVE AND OBJECTIVE BOX
Patient is a 74y old  Female who presents with a chief complaint of low back pain (15 May 2024 14:38)        SUBJECTIVE:  Patient was seen and examined at bedside.    Overnight Events :    -NGT placed 5/15 for  Ileus vs SBO   - CTA negative for PE   - today patient reports feeling better , denies abd pain   - Tachycardia has improved   - no shortness of breath , no chest pain     Review of systems: 12 point Review of systems negative unless otherwise documented elsewhere in note.     Diet, NPO:   Except Medications (05-15-24 @ 12:32) [Active]      MEDICATIONS:  MEDICATIONS  (STANDING):  acetaminophen     Tablet .. 1000 milliGRAM(s) Oral every 8 hours  atorvastatin 40 milliGRAM(s) Oral at bedtime  dextrose 10% Bolus 125 milliLiter(s) IV Bolus once  dextrose 5%. 1000 milliLiter(s) (50 mL/Hr) IV Continuous <Continuous>  dextrose 5%. 1000 milliLiter(s) (100 mL/Hr) IV Continuous <Continuous>  dextrose 50% Injectable 25 Gram(s) IV Push once  dextrose 50% Injectable 12.5 Gram(s) IV Push once  enoxaparin Injectable 40 milliGRAM(s) SubCutaneous every 24 hours  gabapentin 600 milliGRAM(s) Oral every 8 hours  glucagon  Injectable 1 milliGRAM(s) IntraMuscular once  insulin lispro (ADMELOG) corrective regimen sliding scale   SubCutaneous Before meals and at bedtime  lactated ringers. 1000 milliLiter(s) (100 mL/Hr) IV Continuous <Continuous>  lisinopril 20 milliGRAM(s) Oral daily  ondansetron   Disintegrating Tablet 4 milliGRAM(s) Oral every 6 hours  pantoprazole    Tablet 40 milliGRAM(s) Oral before breakfast  polyethylene glycol 3350 17 Gram(s) Oral at bedtime  senna 2 Tablet(s) Oral at bedtime    MEDICATIONS  (PRN):  aluminum hydroxide/magnesium hydroxide/simethicone Suspension 30 milliLiter(s) Oral every 12 hours PRN Indigestion  benzocaine/menthol Lozenge 1 Lozenge Oral every 2 hours PRN Sore Throat  bisacodyl 5 milliGRAM(s) Oral every 12 hours PRN Constipation  dextrose Oral Gel 15 Gram(s) Oral once PRN Blood Glucose LESS THAN 70 milliGRAM(s)/deciliter  diphenhydrAMINE 25 milliGRAM(s) Oral every 6 hours PRN Rash and/or Itching  HYDROmorphone   Tablet 2 milliGRAM(s) Oral every 4 hours PRN Severe Pain (7 - 10)  magnesium hydroxide Suspension 30 milliLiter(s) Oral every 12 hours PRN Constipation  melatonin 5 milliGRAM(s) Oral at bedtime PRN Insomnia  morphine  - Injectable 2 milliGRAM(s) IV Push every 4 hours PRN Breakthrough  SUMAtriptan 50 milliGRAM(s) Oral daily PRN Headache      Allergies    oxycodone (Pruritus)    Intolerances    Dilaudid (Pruritus)      OBJECTIVE:  Vital Signs Last 24 Hrs  T(C): 37.3 (16 May 2024 12:46), Max: 37.7 (16 May 2024 05:50)  T(F): 99.2 (16 May 2024 12:46), Max: 99.8 (16 May 2024 05:50)  HR: 90 (16 May 2024 12:46) (90 - 129)  BP: 123/69 (16 May 2024 12:46) (116/55 - 157/67)  BP(mean): --  RR: 19 (16 May 2024 12:46) (16 - 20)  SpO2: 96% (16 May 2024 12:46) (93% - 98%)    Parameters below as of 16 May 2024 12:46  Patient On (Oxygen Delivery Method): room air            PHYSICAL EXAM:  Gen:  mild distress   HEENT: dry mucosa   Neck: supple, trachea at midline , NGT tube in place   CV: Regular Rate and Rhythm , no ST-T changes   Pulm: no wheezing , no crackles  no increase in work of breathing  Abd: Non distended , Soft   Skin: Cool , Clammy    Ext: moving all 4 extremities spontaneously , no edema  ,  Neuro: AOx3, no gross focal neurological deficits  Psych: affect and behavior appropriate, pleasant at time of interview    LABS:                                   8.7    6.07  )-----------( 276      ( 16 May 2024 05:30 )             26.5     05-16    139  |  105  |  12  ----------------------------<  149<H>  4.1   |  24  |  0.62    Ca    8.9      16 May 2024 05:30  Phos  2.2     05-16  Mg     1.9     05-16            MICRODATA:      RADIOLOGY/OTHER STUDIES:

## 2024-05-16 NOTE — PHYSICAL THERAPY INITIAL EVALUATION ADULT - PERTINENT HX OF CURRENT PROBLEM, REHAB EVAL
75 y/o f c/o low back pain x 1 year. States back pain radiates down bilateral legs. States she has numbness to bilateral feet. Uses assistive walking device to help with ambulation Takes tramadol as needed for her pain without relief. Failed conservative therapies for her symptoms. Denies history of blood clot.   Presents for elective LAMINECTOMY WITH POSTEROLATERAL FUSION L4-L5 with TLIF, RANJITH and REINSTRUMENTATION L4-S1.

## 2024-05-17 LAB
ANION GAP SERPL CALC-SCNC: 11 MMOL/L — SIGNIFICANT CHANGE UP (ref 5–17)
BUN SERPL-MCNC: 9 MG/DL — SIGNIFICANT CHANGE UP (ref 7–23)
CALCIUM SERPL-MCNC: 8.9 MG/DL — SIGNIFICANT CHANGE UP (ref 8.4–10.5)
CHLORIDE SERPL-SCNC: 105 MMOL/L — SIGNIFICANT CHANGE UP (ref 96–108)
CO2 SERPL-SCNC: 23 MMOL/L — SIGNIFICANT CHANGE UP (ref 22–31)
CREAT SERPL-MCNC: 0.59 MG/DL — SIGNIFICANT CHANGE UP (ref 0.5–1.3)
EGFR: 94 ML/MIN/1.73M2 — SIGNIFICANT CHANGE UP
GLUCOSE BLDC GLUCOMTR-MCNC: 104 MG/DL — HIGH (ref 70–99)
GLUCOSE BLDC GLUCOMTR-MCNC: 117 MG/DL — HIGH (ref 70–99)
GLUCOSE BLDC GLUCOMTR-MCNC: 119 MG/DL — HIGH (ref 70–99)
GLUCOSE BLDC GLUCOMTR-MCNC: 150 MG/DL — HIGH (ref 70–99)
GLUCOSE SERPL-MCNC: 110 MG/DL — HIGH (ref 70–99)
HCT VFR BLD CALC: 26.2 % — LOW (ref 34.5–45)
HGB BLD-MCNC: 8.5 G/DL — LOW (ref 11.5–15.5)
MCHC RBC-ENTMCNC: 31.4 PG — SIGNIFICANT CHANGE UP (ref 27–34)
MCHC RBC-ENTMCNC: 32.4 GM/DL — SIGNIFICANT CHANGE UP (ref 32–36)
MCV RBC AUTO: 96.7 FL — SIGNIFICANT CHANGE UP (ref 80–100)
NRBC # BLD: 0 /100 WBCS — SIGNIFICANT CHANGE UP (ref 0–0)
PLATELET # BLD AUTO: 271 K/UL — SIGNIFICANT CHANGE UP (ref 150–400)
POTASSIUM SERPL-MCNC: 3.9 MMOL/L — SIGNIFICANT CHANGE UP (ref 3.5–5.3)
POTASSIUM SERPL-SCNC: 3.9 MMOL/L — SIGNIFICANT CHANGE UP (ref 3.5–5.3)
RBC # BLD: 2.71 M/UL — LOW (ref 3.8–5.2)
RBC # FLD: 14 % — SIGNIFICANT CHANGE UP (ref 10.3–14.5)
SODIUM SERPL-SCNC: 139 MMOL/L — SIGNIFICANT CHANGE UP (ref 135–145)
WBC # BLD: 5.16 K/UL — SIGNIFICANT CHANGE UP (ref 3.8–10.5)
WBC # FLD AUTO: 5.16 K/UL — SIGNIFICANT CHANGE UP (ref 3.8–10.5)

## 2024-05-17 PROCEDURE — 99233 SBSQ HOSP IP/OBS HIGH 50: CPT

## 2024-05-17 PROCEDURE — 99231 SBSQ HOSP IP/OBS SF/LOW 25: CPT | Mod: GC

## 2024-05-17 PROCEDURE — 72100 X-RAY EXAM L-S SPINE 2/3 VWS: CPT | Mod: 26

## 2024-05-17 RX ORDER — INSULIN GLARGINE 100 [IU]/ML
8 INJECTION, SOLUTION SUBCUTANEOUS AT BEDTIME
Refills: 0 | Status: DISCONTINUED | OUTPATIENT
Start: 2024-05-17 | End: 2024-05-19

## 2024-05-17 RX ADMIN — PANTOPRAZOLE SODIUM 40 MILLIGRAM(S): 20 TABLET, DELAYED RELEASE ORAL at 13:17

## 2024-05-17 RX ADMIN — HYDROMORPHONE HYDROCHLORIDE 2 MILLIGRAM(S): 2 INJECTION INTRAMUSCULAR; INTRAVENOUS; SUBCUTANEOUS at 19:45

## 2024-05-17 RX ADMIN — MORPHINE SULFATE 2 MILLIGRAM(S): 50 CAPSULE, EXTENDED RELEASE ORAL at 04:30

## 2024-05-17 RX ADMIN — SODIUM CHLORIDE 100 MILLILITER(S): 9 INJECTION, SOLUTION INTRAVENOUS at 01:38

## 2024-05-17 RX ADMIN — HYDROMORPHONE HYDROCHLORIDE 2 MILLIGRAM(S): 2 INJECTION INTRAMUSCULAR; INTRAVENOUS; SUBCUTANEOUS at 20:56

## 2024-05-17 RX ADMIN — MORPHINE SULFATE 2 MILLIGRAM(S): 50 CAPSULE, EXTENDED RELEASE ORAL at 04:02

## 2024-05-17 RX ADMIN — INSULIN GLARGINE 8 UNIT(S): 100 INJECTION, SOLUTION SUBCUTANEOUS at 21:46

## 2024-05-17 RX ADMIN — ENOXAPARIN SODIUM 40 MILLIGRAM(S): 100 INJECTION SUBCUTANEOUS at 21:47

## 2024-05-17 RX ADMIN — MORPHINE SULFATE 2 MILLIGRAM(S): 50 CAPSULE, EXTENDED RELEASE ORAL at 13:10

## 2024-05-17 RX ADMIN — GABAPENTIN 600 MILLIGRAM(S): 400 CAPSULE ORAL at 21:47

## 2024-05-17 RX ADMIN — ATORVASTATIN CALCIUM 40 MILLIGRAM(S): 80 TABLET, FILM COATED ORAL at 21:47

## 2024-05-17 RX ADMIN — MORPHINE SULFATE 2 MILLIGRAM(S): 50 CAPSULE, EXTENDED RELEASE ORAL at 12:10

## 2024-05-17 NOTE — PROGRESS NOTE ADULT - SUBJECTIVE AND OBJECTIVE BOX
SUBJECTIVE:  This morning, she feels well; her pain is well-controlled. No nausea or vomiting. Passing flatus, small bowel movent yesterday.  Complains of NGT irritating throat.   No abdominal pain, she says she is improved      MEDICATIONS  (STANDING):  atorvastatin 40 milliGRAM(s) Oral at bedtime  dextrose 10% Bolus 125 milliLiter(s) IV Bolus once  dextrose 5%. 1000 milliLiter(s) (100 mL/Hr) IV Continuous <Continuous>  dextrose 5%. 1000 milliLiter(s) (50 mL/Hr) IV Continuous <Continuous>  dextrose 50% Injectable 25 Gram(s) IV Push once  dextrose 50% Injectable 12.5 Gram(s) IV Push once  enoxaparin Injectable 40 milliGRAM(s) SubCutaneous every 24 hours  gabapentin 600 milliGRAM(s) Oral every 8 hours  glucagon  Injectable 1 milliGRAM(s) IntraMuscular once  insulin glargine Injectable (LANTUS) 10 Unit(s) SubCutaneous at bedtime  insulin lispro (ADMELOG) corrective regimen sliding scale   SubCutaneous Before meals and at bedtime  lactated ringers. 1000 milliLiter(s) (100 mL/Hr) IV Continuous <Continuous>  lisinopril 20 milliGRAM(s) Oral daily  pantoprazole  Injectable 40 milliGRAM(s) IV Push daily  polyethylene glycol 3350 17 Gram(s) Oral at bedtime  senna 2 Tablet(s) Oral at bedtime    MEDICATIONS  (PRN):  aluminum hydroxide/magnesium hydroxide/simethicone Suspension 30 milliLiter(s) Oral every 12 hours PRN Indigestion  benzocaine/menthol Lozenge 1 Lozenge Oral every 2 hours PRN Sore Throat  bisacodyl 5 milliGRAM(s) Oral every 12 hours PRN Constipation  dextrose Oral Gel 15 Gram(s) Oral once PRN Blood Glucose LESS THAN 70 milliGRAM(s)/deciliter  diphenhydrAMINE 25 milliGRAM(s) Oral every 6 hours PRN Rash and/or Itching  diphenhydrAMINE Injectable 50 milliGRAM(s) IV Push at bedtime PRN Insomnia  HYDROmorphone   Tablet 2 milliGRAM(s) Oral every 4 hours PRN Severe Pain (7 - 10)  magnesium hydroxide Suspension 30 milliLiter(s) Oral every 12 hours PRN Constipation  melatonin 5 milliGRAM(s) Oral at bedtime PRN Insomnia  morphine  - Injectable 2 milliGRAM(s) IV Push every 4 hours PRN Severe Pain (7 - 10)  ondansetron Injectable 4 milliGRAM(s) IV Push every 6 hours PRN Nausea and/or Vomiting  SUMAtriptan 50 milliGRAM(s) Oral daily PRN Headache      Vital Signs Last 24 Hrs  T(C): 36.8 (17 May 2024 08:41), Max: 37.6 (16 May 2024 16:17)  T(F): 98.3 (17 May 2024 08:41), Max: 99.6 (16 May 2024 16:17)  HR: 96 (17 May 2024 12:12) (86 - 98)  BP: 136/57 (17 May 2024 12:12) (123/60 - 152/68)  BP(mean): --  RR: 17 (17 May 2024 12:12) (17 - 20)  SpO2: 98% (17 May 2024 12:12) (92% - 100%)    Parameters below as of 17 May 2024 12:12  Patient On (Oxygen Delivery Method): room air        Physical Exam:  General: NAD, resting comfortably in bed  Pulmonary: Nonlabored breathing, no respiratory distress  Cardiovascular: NSR  Abdominal: soft, NT/ND  Extremities: WWP, normal strength  Neuro: A/O x 3, CNs II-XII grossly intact, no focal deficits    I&O's Summary    16 May 2024 07:01  -  17 May 2024 07:00  --------------------------------------------------------  IN: 1200 mL / OUT: 2075 mL / NET: -875 mL        LABS:                        8.5    5.16  )-----------( 271      ( 17 May 2024 06:47 )             26.2     05-17    139  |  105  |  9   ----------------------------<  110<H>  3.9   |  23  |  0.59    Ca    8.9      17 May 2024 06:47  Phos  2.2     05-16  Mg     1.9     05-16        Urinalysis Basic - ( 17 May 2024 06:47 )    Color: x / Appearance: x / SG: x / pH: x  Gluc: 110 mg/dL / Ketone: x  / Bili: x / Urobili: x   Blood: x / Protein: x / Nitrite: x   Leuk Esterase: x / RBC: x / WBC x   Sq Epi: x / Non Sq Epi: x / Bacteria: x      CAPILLARY BLOOD GLUCOSE      POCT Blood Glucose.: 117 mg/dL (17 May 2024 12:28)  POCT Blood Glucose.: 119 mg/dL (17 May 2024 06:33)  POCT Blood Glucose.: 129 mg/dL (16 May 2024 21:52)  POCT Blood Glucose.: 138 mg/dL (16 May 2024 17:44)        RADIOLOGY & ADDITIONAL STUDIES:

## 2024-05-17 NOTE — PROGRESS NOTE ADULT - ASSESSMENT
74y old  Female w/ pmhx of Diabetes, HTN/HLD colon polyp p/w  low back pain now S/p  L4-L5 TLIF, L4-S1 PSF on 5/13/24    #Low back pain S/p  L4-L5 TLIF, L4-S1 PSF on 5/13/24  #Post operative state   - Will continue pain control , DVT prophylaxis , weightbearing status , dressing changes as  per ortho team    # Ileus   - Pt is passing gas and had a bowel movement   -Surgery f/u appreciated and per surgical team the pt's NGT can be removed and can advance diet as tolerated   -Will avoid narcotics   - Will monitor electrolytes   -Will continue serial abdominal exams     # Chest pain (resolved)  -Pt is s/p an EKG which was  Sinus Tachy,  rate related ST-T depression   - Pt s/p CTA on 5/15 which was negative for pneumonia and PE     # Acute Blood Loss Anemia   -Pt's H/H 8.5/26.2   -Will continue to monitor CBC and transfuse for Hgb < 7gm/dl   - Will check iron studies    # Diabetes  -Pt's HgbA1c 7.3   -Home medications: glimepiride and metformin , currently on hold   -Endocrine following; Will continue Lantus 10 units qhs   -Will continue ISS and monitor FS and adjust Lantus as per Endocrine recommendation.     # HTN   -Will continue Lisinopril 20mg PO daily   -Will f/u BP and adjust antihypertensive as required     # HLD   -Continue Atorvastatin 40mg PO daily     #DVT PPx  -Will continue Enoxaparin 40mg SQ daily     #DISPO  -Pt was seen by PT and recommended for DONNIE however PT stated that if the pt will go home then they will need home PT.   -As per Ortho     50 minutes spent on total encounter. The necessity of the time spent during the encounter on this date of service was due to:    Review of hospital course, labs, vitals, medical records.  Bedside exam and patient interview   Discussed plan of care with primary team as well as consultant.   Documenting the encounter.

## 2024-05-17 NOTE — PROGRESS NOTE ADULT - SUBJECTIVE AND OBJECTIVE BOX
SUBJECTIVE / INTERVAL HPI: Patient seen and examined at bedside. Denies f/c, n/v, HA, chest pain, SOB, abdominal pain, diarrhea, constipation, melena, hematochezia, hematuria, dysuria    MEDICATIONS  (STANDING):  atorvastatin 40 milliGRAM(s) Oral at bedtime  dextrose 10% Bolus 125 milliLiter(s) IV Bolus once  dextrose 5%. 1000 milliLiter(s) (100 mL/Hr) IV Continuous <Continuous>  dextrose 5%. 1000 milliLiter(s) (50 mL/Hr) IV Continuous <Continuous>  dextrose 50% Injectable 25 Gram(s) IV Push once  dextrose 50% Injectable 12.5 Gram(s) IV Push once  enoxaparin Injectable 40 milliGRAM(s) SubCutaneous every 24 hours  gabapentin 600 milliGRAM(s) Oral every 8 hours  glucagon  Injectable 1 milliGRAM(s) IntraMuscular once  insulin glargine Injectable (LANTUS) 10 Unit(s) SubCutaneous at bedtime  insulin lispro (ADMELOG) corrective regimen sliding scale   SubCutaneous Before meals and at bedtime  lactated ringers. 1000 milliLiter(s) (100 mL/Hr) IV Continuous <Continuous>  lisinopril 20 milliGRAM(s) Oral daily  pantoprazole  Injectable 40 milliGRAM(s) IV Push daily  polyethylene glycol 3350 17 Gram(s) Oral at bedtime  senna 2 Tablet(s) Oral at bedtime    MEDICATIONS  (PRN):  aluminum hydroxide/magnesium hydroxide/simethicone Suspension 30 milliLiter(s) Oral every 12 hours PRN Indigestion  benzocaine/menthol Lozenge 1 Lozenge Oral every 2 hours PRN Sore Throat  bisacodyl 5 milliGRAM(s) Oral every 12 hours PRN Constipation  dextrose Oral Gel 15 Gram(s) Oral once PRN Blood Glucose LESS THAN 70 milliGRAM(s)/deciliter  diphenhydrAMINE 25 milliGRAM(s) Oral every 6 hours PRN Rash and/or Itching  diphenhydrAMINE Injectable 50 milliGRAM(s) IV Push at bedtime PRN Insomnia  HYDROmorphone   Tablet 2 milliGRAM(s) Oral every 4 hours PRN Severe Pain (7 - 10)  magnesium hydroxide Suspension 30 milliLiter(s) Oral every 12 hours PRN Constipation  melatonin 5 milliGRAM(s) Oral at bedtime PRN Insomnia  morphine  - Injectable 2 milliGRAM(s) IV Push every 4 hours PRN Severe Pain (7 - 10)  ondansetron Injectable 4 milliGRAM(s) IV Push every 6 hours PRN Nausea and/or Vomiting  SUMAtriptan 50 milliGRAM(s) Oral daily PRN Headache    Allergies    oxycodone (Pruritus)    Intolerances    Dilaudid (Pruritus)      VITAL SIGNS:  Vital Signs Last 24 Hrs  T(C): 36.8 (17 May 2024 08:41), Max: 37.6 (16 May 2024 16:17)  T(F): 98.3 (17 May 2024 08:41), Max: 99.6 (16 May 2024 16:17)  HR: 86 (17 May 2024 08:41) (86 - 98)  BP: 140/62 (17 May 2024 08:41) (123/60 - 171/70)  BP(mean): --  RR: 18 (17 May 2024 08:41) (17 - 20)  SpO2: 92% (17 May 2024 08:41) (92% - 100%)    Parameters below as of 17 May 2024 08:41  Patient On (Oxygen Delivery Method): room air          05-16-24 @ 07:01  -  05-17-24 @ 07:00  --------------------------------------------------------  IN: 1200 mL / OUT: 2075 mL / NET: -875 mL        PHYSICAL EXAM:  General: NAD, Laying comfortably in bed  HEENT: NC/AT, anicteric sclera, MMM  Neck: supple  Cardiovascular: +S1/S2, RRR, No murmurs  Respiratory: CTA B/L, no W/R/R  Gastrointestinal: soft, NT, mildly distended, +BSx4  Extremities: WWP, no edema, clubbing or cyanosis  Vascular: 2+ radial, DP/PT pulses B/L  Neurological: AAOx3, no focal deficits      LABS:                        8.5    5.16  )-----------( 271      ( 17 May 2024 06:47 )             26.2     05-17    139  |  105  |  9   ----------------------------<  110<H>  3.9   |  23  |  0.59    Ca    8.9      17 May 2024 06:47  Phos  2.2     05-16  Mg     1.9     05-16        Urinalysis Basic - ( 17 May 2024 06:47 )    Color: x / Appearance: x / SG: x / pH: x  Gluc: 110 mg/dL / Ketone: x  / Bili: x / Urobili: x   Blood: x / Protein: x / Nitrite: x   Leuk Esterase: x / RBC: x / WBC x   Sq Epi: x / Non Sq Epi: x / Bacteria: x      CAPILLARY BLOOD GLUCOSE      POCT Blood Glucose.: 119 mg/dL (17 May 2024 06:33)  POCT Blood Glucose.: 129 mg/dL (16 May 2024 21:52)  POCT Blood Glucose.: 138 mg/dL (16 May 2024 17:44)          RADIOLOGY & ADDITIONAL TESTS: Reviewed. SUBJECTIVE / INTERVAL HPI: Patient seen and examined at bedside. Patient reports bowel movements overnight and this morning. Noticeable improvement of her abdominal distension. She denies any N/V. Ortho team planning to start CLD for dinner.     MEDICATIONS  (STANDING):  atorvastatin 40 milliGRAM(s) Oral at bedtime  dextrose 10% Bolus 125 milliLiter(s) IV Bolus once  dextrose 5%. 1000 milliLiter(s) (100 mL/Hr) IV Continuous <Continuous>  dextrose 5%. 1000 milliLiter(s) (50 mL/Hr) IV Continuous <Continuous>  dextrose 50% Injectable 25 Gram(s) IV Push once  dextrose 50% Injectable 12.5 Gram(s) IV Push once  enoxaparin Injectable 40 milliGRAM(s) SubCutaneous every 24 hours  gabapentin 600 milliGRAM(s) Oral every 8 hours  glucagon  Injectable 1 milliGRAM(s) IntraMuscular once  insulin glargine Injectable (LANTUS) 10 Unit(s) SubCutaneous at bedtime  insulin lispro (ADMELOG) corrective regimen sliding scale   SubCutaneous Before meals and at bedtime  lactated ringers. 1000 milliLiter(s) (100 mL/Hr) IV Continuous <Continuous>  lisinopril 20 milliGRAM(s) Oral daily  pantoprazole  Injectable 40 milliGRAM(s) IV Push daily  polyethylene glycol 3350 17 Gram(s) Oral at bedtime  senna 2 Tablet(s) Oral at bedtime    MEDICATIONS  (PRN):  aluminum hydroxide/magnesium hydroxide/simethicone Suspension 30 milliLiter(s) Oral every 12 hours PRN Indigestion  benzocaine/menthol Lozenge 1 Lozenge Oral every 2 hours PRN Sore Throat  bisacodyl 5 milliGRAM(s) Oral every 12 hours PRN Constipation  dextrose Oral Gel 15 Gram(s) Oral once PRN Blood Glucose LESS THAN 70 milliGRAM(s)/deciliter  diphenhydrAMINE 25 milliGRAM(s) Oral every 6 hours PRN Rash and/or Itching  diphenhydrAMINE Injectable 50 milliGRAM(s) IV Push at bedtime PRN Insomnia  HYDROmorphone   Tablet 2 milliGRAM(s) Oral every 4 hours PRN Severe Pain (7 - 10)  magnesium hydroxide Suspension 30 milliLiter(s) Oral every 12 hours PRN Constipation  melatonin 5 milliGRAM(s) Oral at bedtime PRN Insomnia  morphine  - Injectable 2 milliGRAM(s) IV Push every 4 hours PRN Severe Pain (7 - 10)  ondansetron Injectable 4 milliGRAM(s) IV Push every 6 hours PRN Nausea and/or Vomiting  SUMAtriptan 50 milliGRAM(s) Oral daily PRN Headache    Allergies    oxycodone (Pruritus)    Intolerances    Dilaudid (Pruritus)      VITAL SIGNS:  Vital Signs Last 24 Hrs  T(C): 36.8 (17 May 2024 08:41), Max: 37.6 (16 May 2024 16:17)  T(F): 98.3 (17 May 2024 08:41), Max: 99.6 (16 May 2024 16:17)  HR: 86 (17 May 2024 08:41) (86 - 98)  BP: 140/62 (17 May 2024 08:41) (123/60 - 171/70)  BP(mean): --  RR: 18 (17 May 2024 08:41) (17 - 20)  SpO2: 92% (17 May 2024 08:41) (92% - 100%)    Parameters below as of 17 May 2024 08:41  Patient On (Oxygen Delivery Method): room air          05-16-24 @ 07:01  -  05-17-24 @ 07:00  --------------------------------------------------------  IN: 1200 mL / OUT: 2075 mL / NET: -875 mL        PHYSICAL EXAM:  General: NAD, Laying comfortably in bed  HEENT: NC/AT, anicteric sclera, MMM  Neck: supple  Cardiovascular: +S1/S2, RRR, No murmurs  Respiratory: CTA B/L, no W/R/R  Gastrointestinal: soft, NT, mildly distended, +BSx4  Extremities: WWP, no edema, clubbing or cyanosis  Vascular: 2+ radial, DP/PT pulses B/L  Neurological: AAOx3, no focal deficits      LABS:                        8.5    5.16  )-----------( 271      ( 17 May 2024 06:47 )             26.2     05-17    139  |  105  |  9   ----------------------------<  110<H>  3.9   |  23  |  0.59    Ca    8.9      17 May 2024 06:47  Phos  2.2     05-16  Mg     1.9     05-16        Urinalysis Basic - ( 17 May 2024 06:47 )    Color: x / Appearance: x / SG: x / pH: x  Gluc: 110 mg/dL / Ketone: x  / Bili: x / Urobili: x   Blood: x / Protein: x / Nitrite: x   Leuk Esterase: x / RBC: x / WBC x   Sq Epi: x / Non Sq Epi: x / Bacteria: x      CAPILLARY BLOOD GLUCOSE      POCT Blood Glucose.: 119 mg/dL (17 May 2024 06:33)  POCT Blood Glucose.: 129 mg/dL (16 May 2024 21:52)  POCT Blood Glucose.: 138 mg/dL (16 May 2024 17:44)          RADIOLOGY & ADDITIONAL TESTS: Reviewed.

## 2024-05-17 NOTE — PROGRESS NOTE ADULT - SUBJECTIVE AND OBJECTIVE BOX
Ortho Note    Subjective:  Patient seen and examined today   Pt reporting surgical site pain managed with current pain medication regimen   Denies CP, SOB, N/V, patient reports tingling and numbness to the base of her bilateral feet, improving  Reviewed plan of care with patient at bedside  Patient expresses that she does not want to go to rehab and that she has a niece at home that will stay with her 24 hours a day.         Vital Signs Last 24 Hrs  T(C): 36.8 (05-17-24 @ 08:41), Max: 36.8 (05-17-24 @ 08:41)  T(F): 98.3 (05-17-24 @ 08:41), Max: 98.3 (05-17-24 @ 08:41)  HR: 96 (05-17-24 @ 12:12) (86 - 96)  BP: 136/57 (05-17-24 @ 12:12) (136/57 - 140/62)  BP(mean): --  RR: 17 (05-17-24 @ 12:12) (17 - 18)  SpO2: 98% (05-17-24 @ 12:12) (92% - 98%)  AVSS    Objective:    Physical Exam:  General: Pt Alert and oriented, NAD  Lumbar DSG C/D/I hv x2- dcd 5-17   Pulses: +2 pedal pulses, wwp toes  Sensation: silt intact bilateral lower extremities  Motor: EHL/FHL/TA/GS- 5/5 bilateral lower extremities  abdomen distended, soft to touch, NGT to LWS-     Plan of Care:  A/P: 74yFemale POD#4 s/p L4-L5 TLIF, L4-S1   - afebrile, wbcs 5.16  - Pain Control- morphine 2mg IVP q4h prn severe pain while NPO   - DVT ppx: Lovenox 40mg Subq Q24h   - PT, WBS: WBAT  - NTG TO LWS -   - appreciate medicine recs  - appreciate surgery recs-   - DC hv x2 5-17   - start clear diet when patient is no longer NPO   - Dispo- recommended for DONNIE, patient requesting home stating she has a neice that will stay with her for 24 hours a day,  pending medical clearance, pt clearance to 1 person assist and dc of drains       Ortho Pager [phone]

## 2024-05-17 NOTE — PROGRESS NOTE ADULT - SUBJECTIVE AND OBJECTIVE BOX
Patient was seen and examined at bedside. Case discuss with the primary team.     OBJECTIVE:  Vital Signs Last 24 Hrs  T(C): 36.8 (17 May 2024 08:41), Max: 37.6 (16 May 2024 16:17)  T(F): 98.3 (17 May 2024 08:41), Max: 99.6 (16 May 2024 16:17)  HR: 96 (17 May 2024 12:12) (86 - 98)  BP: 136/57 (17 May 2024 12:12) (123/60 - 152/68)  RR: 17 (17 May 2024 12:12) (17 - 20)  SpO2: 98% (17 May 2024 12:12) (92% - 100%)    Parameters below as of 17 May 2024 12:12  Patient On (Oxygen Delivery Method): room air    PHYSICAL EXAM:  Gen: NAD laying in bed  CV: RRR, +S1/S2, no mumur  Pulm: CTA b/l no wheezing or crackles   Abd: soft, NTND + BS no rebound or guarding       LABS:                        8.5    5.16  )-----------( 271      ( 17 May 2024 06:47 )             26.2     05-17    139  |  105  |  9   ----------------------------<  110<H>  3.9   |  23  |  0.59    Ca    8.9      17 May 2024 06:47  Phos  2.2     05-16  Mg     1.9     05-16    CAPILLARY BLOOD GLUCOSE  POCT Blood Glucose.: 117 mg/dL (17 May 2024 12:28)  POCT Blood Glucose.: 119 mg/dL (17 May 2024 06:33)  POCT Blood Glucose.: 129 mg/dL (16 May 2024 21:52)  POCT Blood Glucose.: 138 mg/dL (16 May 2024 17:44)    5/17 Xray Lumbar spine:  Two views of lumbar spine demonstrates interval extension of the   hardware which is now extending from L4 through S1. Previously the   hardware is seen from L5 and S1. There is an interbody spacer at L4-5 and   L5-S1. Surgical clips overlie the surgical site. There are drainage   catheter is in place both anteriorly and posteriorly. Surgical clips   overlie the right iliac bone.    5/15 CT abdomen/pelvis: No evidence of pulmonary embolism.    Nasogastric tube in place with distal tip in the low stomach. Moderate   gastric distention with debris and fluid. No gastric outlet obstruction    Mildly distended fluid-filled small bowel within the central and left   side of the abdomen. Ileus versus partial or incomplete obstruction could   have this appearance. No evidence of transition point. No high-grade   bowel obstruction present at this time. Further evaluation and follow-up   recommended as clinically indicated.    Postsurgical changes L4, L5 and S1. Small amounts of gas within the   paraspinal soft tissues consistent with postsurgical changes. No focal   fluid collection or abscess.    aluminum hydroxide/magnesium hydroxide/simethicone Suspension 30 milliLiter(s) Oral every 12 hours PRN  atorvastatin 40 milliGRAM(s) Oral at bedtime  benzocaine/menthol Lozenge 1 Lozenge Oral every 2 hours PRN  bisacodyl 5 milliGRAM(s) Oral every 12 hours PRN  diphenhydrAMINE 25 milliGRAM(s) Oral every 6 hours PRN  diphenhydrAMINE Injectable 50 milliGRAM(s) IV Push at bedtime PRN  enoxaparin Injectable 40 milliGRAM(s) SubCutaneous every 24 hours  gabapentin 600 milliGRAM(s) Oral every 8 hours  glucagon  Injectable 1 milliGRAM(s) IntraMuscular once  HYDROmorphone   Tablet 2 milliGRAM(s) Oral every 4 hours PRN  insulin glargine Injectable (LANTUS) 10 Unit(s) SubCutaneous at bedtime  insulin lispro (ADMELOG) corrective regimen sliding scale   SubCutaneous Before meals and at bedtime  lactated ringers. 1000 milliLiter(s) IV Continuous <Continuous>  lisinopril 20 milliGRAM(s) Oral daily  magnesium hydroxide Suspension 30 milliLiter(s) Oral every 12 hours PRN  melatonin 5 milliGRAM(s) Oral at bedtime PRN  morphine  - Injectable 2 milliGRAM(s) IV Push every 4 hours PRN  ondansetron Injectable 4 milliGRAM(s) IV Push every 6 hours PRN  pantoprazole  Injectable 40 milliGRAM(s) IV Push daily  polyethylene glycol 3350 17 Gram(s) Oral at bedtime  senna 2 Tablet(s) Oral at bedtime  SUMAtriptan 50 milliGRAM(s) Oral daily PRN

## 2024-05-17 NOTE — PROGRESS NOTE ADULT - ATTENDING COMMENTS
Pt seen on rounds this afternoon.  Has still not had a bowel movement but says that she is passing gas, and her abdominal distension has decreased.  No vomiting. Denies nausea but is not hungry.  Is still NPO.  Incisional back pain continues  Fingersticks 209/173 yesterday evening.  She did not receive Lantus, but AM today was down to 161  To start Lantus 10 units qhs  Continue sliding scale coverage
Pt seen on rounds this afternoon.   Looks and feels distinctly better after having had several bowel movements overnight and this morning  ABdominal distension has decreased significantly--is soft, non-tender and distinctly less protuberant on exam  Extremities with normal skin turgor, no edema  Glucoses have been in the 115-130 range on 10 units Lantus  To be started on clear liquid diet.  --To decrease the Lantus to 8 units  --Continue sliding scale coverage  --Clear liquid diet should be consistent carb, and does not require a standing premeal dose of lispro

## 2024-05-17 NOTE — PROGRESS NOTE ADULT - ASSESSMENT
74F with PMHx of DMII, HTN, HLD, GERD chronic back pain and PSHx of hysteretomy appendectomy  who presented for elective L4-L5 TLIF and L4-S1 posterior spinal fusion with Dr. Evangelista on 5/13.   POD2 patient developed abominal distention with bilious emesis requiring NGT decompression. CTAP revealing SBO vs. ileus for which sugery was consulted.  Assuming ileus given lack of transition point and smooth taper.    Recommendations:  - No acute surgical intervention at this time  - Can remove tube and advance feeds as tolerated   - Avoid narcotics  - Please correct electrolyte abnormalities   - Serial abdominal exams  - Surgery Team 4C will continue to follow. Please call with any questions/concerns

## 2024-05-17 NOTE — PROGRESS NOTE ADULT - TIME BILLING
Bedside exam and interview    Review of hospital course, labs, vitals, imaging ,  medical records.   Reviewing outside records   Discharge planning on Interdisciplinary rounds   Discussion with consultants and Primary team   Documenting the encounter.
Bedside exam and interview    Review of hospital course, labs, vitals, imaging ,  medical records.   Reviewing outside records   Discharge planning on Interdisciplinary rounds   Discussion with consultants and Primary team   Documenting the encounter.
Basal insulin to be started
Insulin management

## 2024-05-17 NOTE — PROGRESS NOTE ADULT - ASSESSMENT
A/P:  74 year old Female with a past medical history of hypertension, diabetes, hyperlipidemia, arthritis, gastroesophageal reflux disease, and osteoarthritis, admitted for elective laminectomy with posterolateral fusion L4-L5 with tlif, margo and reinstrumentation L4-S1 and POD2. Endocrinology consulted for management of diabetes. Course c/b post-op ileus and most likely opioid-induced constipation    Noted with A1c 7.5  Weight: 65.4 Kg  BUN 8    1.  T2DM  - Please continue lantus 10U at bedtime  - Please continue fingersticks q6h with mISS while NPO  - Continue LR 100cc/h    2. Post-op ileus/opioid-induced constipation  - Continue miralax at bedtime  - Start senna and dulcolax BID  - Give bisacodyl suppository 10 mg ME once   - Keep NPO for now   - Limit use of opioids and initiate other non-opioid pain regimens, consider pain management consult   - Ensure OOBTC  - Defer rest of management to Medicine consult service     Will continue to monitor     Pt can follow up at discharge with [facility] by calling  to make an appointment.     Discussed with Dr Alvarez.    A/P:  74 year old Female with a past medical history of hypertension, diabetes, hyperlipidemia, arthritis, gastroesophageal reflux disease, and osteoarthritis, admitted for elective laminectomy with posterolateral fusion L4-L5 with tlif, margo and reinstrumentation L4-S1 and POD2. Endocrinology consulted for management of diabetes. Course c/b post-op ileus and most likely opioid-induced constipation    Noted with A1c 7.5  Weight: 65.4 Kg  BUN 9    1.  T2DM  - Please continue lantus 10U with mISS at bedtime   - Please start mISS with meals/TID (with PO intake)  - Continue LR 100cc/h    2. Post-op ileus/opioid-induced constipation  - Continue miralax at bedtime  - Continue senna and dulcolax BID  - Start CLD on 5/17 and advance diet as tolerated   - Limit use of opioids and initiate other non-opioid pain regimens, consider pain management consult   - Ensure OOBTC  - Defer rest of management to Medicine consult service     Will continue to monitor     Pt can follow up at discharge with [facility] by calling  to make an appointment.     Discussed with Dr Alvarez.    A/P:  74 year old Female with a past medical history of hypertension, diabetes, hyperlipidemia, arthritis, gastroesophageal reflux disease, and osteoarthritis, admitted for elective laminectomy with posterolateral fusion L4-L5 with tlif, margo and reinstrumentation L4-S1 and POD2. Endocrinology consulted for management of diabetes. Course c/b post-op ileus and most likely opioid-induced constipation    Noted with A1c 7.5  Weight: 65.4 Kg  BUN 9    1.  T2DM  - Please continue insulin lantus 10U with mISS at bedtime   - Please start insulin Lispro 3U with mISS for meals/TID    - Continue LR 100cc/h    2. Post-op ileus/opioid-induced constipation  - Continue miralax at bedtime  - Continue senna and dulcolax BID  - Start CLD on 5/17 and advance diet as tolerated   - Limit use of opioids and initiate other non-opioid pain regimens, consider pain management consult   - Ensure OOBTC  - Defer rest of management to Medicine consult service     Will continue to monitor     Pt can follow up at discharge with [facility] by calling  to make an appointment.     Discussed with Dr Alvarez.    A/P:  74 year old Female with a past medical history of hypertension, diabetes, hyperlipidemia, arthritis, gastroesophageal reflux disease, and osteoarthritis, admitted for elective laminectomy with posterolateral fusion L4-L5 with tlif, margo and reinstrumentation L4-S1 and POD2. Endocrinology consulted for management of diabetes. Course c/b post-op ileus and most likely opioid-induced constipation    Noted with A1c 7.5  Weight: 65.4 Kg  BUN 9    1.  T2DM  - Please start insulin lantus 8U with mISS at bedtime   - Please start CLD CC diet  - Continue LR 100cc/h    2. Post-op ileus/opioid-induced constipation  - Continue miralax at bedtime  - Continue senna and dulcolax BID  - Start CLD CC on 5/17 and advance diet as tolerated   - Limit use of opioids and initiate other non-opioid pain regimens, consider pain management consult   - Ensure OOBTC  - Defer rest of management to Medicine consult service     Will continue to monitor     Pt can follow up at discharge with [facility] by calling  to make an appointment.     Discussed with Dr Alvarez.

## 2024-05-17 NOTE — PROGRESS NOTE ADULT - SUBJECTIVE AND OBJECTIVE BOX
Orthopedics Floor Note    Procedure: L4-L5 TLIF, L4-S1 PSF   Surgeon: Dr Evangelista     Sleeping comfortably.    Vital Signs Last 24 Hrs  T(C): 36.8 (17 May 2024 08:41), Max: 37.6 (16 May 2024 16:17)  T(F): 98.3 (17 May 2024 08:41), Max: 99.6 (16 May 2024 16:17)  HR: 86 (17 May 2024 08:41) (86 - 98)  BP: 140/62 (17 May 2024 08:41) (123/60 - 171/70)  BP(mean): --  RR: 18 (17 May 2024 08:41) (17 - 20)  SpO2: 92% (17 May 2024 08:41) (92% - 100%)    Parameters below as of 17 May 2024 08:41  Patient On (Oxygen Delivery Method): room air      AVSS, NAD  General: NAD  B/l LE:  Sensation: SILT L2-S1  Motor:   L2 (hip flexion)  R-unable to SLR, L-able to SLR  L3 (knee extension)  R-unable to SLR, L-able to SLR  L4 (ankle dorsiflexion)  5/5   L5 (long toe extension) 5/5   S1 (ankle plantar flexion) 5/5    A/P: 74yFemale s/p L4-L5 TLIF, L4-S1 PSF on 5/13 with Dr. Evangelista.  - Stable, monitor labs/vs  - Pain Control with ERAS   - DVT ppx: SCDs, LVX  - Carito-op abx: Ancef   - PT, WBS: WBAT   - F/U AM Labs

## 2024-05-18 LAB
ANION GAP SERPL CALC-SCNC: 11 MMOL/L — SIGNIFICANT CHANGE UP (ref 5–17)
BUN SERPL-MCNC: 7 MG/DL — SIGNIFICANT CHANGE UP (ref 7–23)
CALCIUM SERPL-MCNC: 8.8 MG/DL — SIGNIFICANT CHANGE UP (ref 8.4–10.5)
CHLORIDE SERPL-SCNC: 105 MMOL/L — SIGNIFICANT CHANGE UP (ref 96–108)
CO2 SERPL-SCNC: 23 MMOL/L — SIGNIFICANT CHANGE UP (ref 22–31)
CREAT SERPL-MCNC: 0.51 MG/DL — SIGNIFICANT CHANGE UP (ref 0.5–1.3)
EGFR: 97 ML/MIN/1.73M2 — SIGNIFICANT CHANGE UP
GLUCOSE BLDC GLUCOMTR-MCNC: 120 MG/DL — HIGH (ref 70–99)
GLUCOSE BLDC GLUCOMTR-MCNC: 130 MG/DL — HIGH (ref 70–99)
GLUCOSE BLDC GLUCOMTR-MCNC: 136 MG/DL — HIGH (ref 70–99)
GLUCOSE BLDC GLUCOMTR-MCNC: 155 MG/DL — HIGH (ref 70–99)
GLUCOSE SERPL-MCNC: 118 MG/DL — HIGH (ref 70–99)
HCT VFR BLD CALC: 24.2 % — LOW (ref 34.5–45)
HGB BLD-MCNC: 8 G/DL — LOW (ref 11.5–15.5)
MCHC RBC-ENTMCNC: 31.7 PG — SIGNIFICANT CHANGE UP (ref 27–34)
MCHC RBC-ENTMCNC: 33.1 GM/DL — SIGNIFICANT CHANGE UP (ref 32–36)
MCV RBC AUTO: 96 FL — SIGNIFICANT CHANGE UP (ref 80–100)
NRBC # BLD: 0 /100 WBCS — SIGNIFICANT CHANGE UP (ref 0–0)
PLATELET # BLD AUTO: 265 K/UL — SIGNIFICANT CHANGE UP (ref 150–400)
POTASSIUM SERPL-MCNC: 3.7 MMOL/L — SIGNIFICANT CHANGE UP (ref 3.5–5.3)
POTASSIUM SERPL-SCNC: 3.7 MMOL/L — SIGNIFICANT CHANGE UP (ref 3.5–5.3)
RBC # BLD: 2.52 M/UL — LOW (ref 3.8–5.2)
RBC # FLD: 13.8 % — SIGNIFICANT CHANGE UP (ref 10.3–14.5)
SODIUM SERPL-SCNC: 139 MMOL/L — SIGNIFICANT CHANGE UP (ref 135–145)
WBC # BLD: 6.01 K/UL — SIGNIFICANT CHANGE UP (ref 3.8–10.5)
WBC # FLD AUTO: 6.01 K/UL — SIGNIFICANT CHANGE UP (ref 3.8–10.5)

## 2024-05-18 PROCEDURE — 99232 SBSQ HOSP IP/OBS MODERATE 35: CPT

## 2024-05-18 RX ORDER — POTASSIUM CHLORIDE 20 MEQ
20 PACKET (EA) ORAL ONCE
Refills: 0 | Status: COMPLETED | OUTPATIENT
Start: 2024-05-18 | End: 2024-05-18

## 2024-05-18 RX ADMIN — GABAPENTIN 600 MILLIGRAM(S): 400 CAPSULE ORAL at 05:01

## 2024-05-18 RX ADMIN — INSULIN GLARGINE 8 UNIT(S): 100 INJECTION, SOLUTION SUBCUTANEOUS at 22:14

## 2024-05-18 RX ADMIN — PANTOPRAZOLE SODIUM 40 MILLIGRAM(S): 20 TABLET, DELAYED RELEASE ORAL at 11:21

## 2024-05-18 RX ADMIN — Medication 20 MILLIEQUIVALENT(S): at 11:20

## 2024-05-18 RX ADMIN — Medication 2: at 22:13

## 2024-05-18 RX ADMIN — POLYETHYLENE GLYCOL 3350 17 GRAM(S): 17 POWDER, FOR SOLUTION ORAL at 22:11

## 2024-05-18 RX ADMIN — ATORVASTATIN CALCIUM 40 MILLIGRAM(S): 80 TABLET, FILM COATED ORAL at 22:12

## 2024-05-18 RX ADMIN — GABAPENTIN 600 MILLIGRAM(S): 400 CAPSULE ORAL at 14:07

## 2024-05-18 RX ADMIN — HYDROMORPHONE HYDROCHLORIDE 2 MILLIGRAM(S): 2 INJECTION INTRAMUSCULAR; INTRAVENOUS; SUBCUTANEOUS at 11:46

## 2024-05-18 RX ADMIN — SENNA PLUS 2 TABLET(S): 8.6 TABLET ORAL at 22:12

## 2024-05-18 RX ADMIN — HYDROMORPHONE HYDROCHLORIDE 2 MILLIGRAM(S): 2 INJECTION INTRAMUSCULAR; INTRAVENOUS; SUBCUTANEOUS at 23:13

## 2024-05-18 RX ADMIN — HYDROMORPHONE HYDROCHLORIDE 2 MILLIGRAM(S): 2 INJECTION INTRAMUSCULAR; INTRAVENOUS; SUBCUTANEOUS at 12:30

## 2024-05-18 RX ADMIN — ENOXAPARIN SODIUM 40 MILLIGRAM(S): 100 INJECTION SUBCUTANEOUS at 22:13

## 2024-05-18 RX ADMIN — GABAPENTIN 600 MILLIGRAM(S): 400 CAPSULE ORAL at 22:13

## 2024-05-18 RX ADMIN — HYDROMORPHONE HYDROCHLORIDE 2 MILLIGRAM(S): 2 INJECTION INTRAMUSCULAR; INTRAVENOUS; SUBCUTANEOUS at 22:13

## 2024-05-18 RX ADMIN — LISINOPRIL 20 MILLIGRAM(S): 2.5 TABLET ORAL at 05:01

## 2024-05-18 NOTE — PROGRESS NOTE ADULT - ASSESSMENT
74y old  Female w/ pmhx of Diabetes, HTN/HLD colon polyp p/w  low back pain now S/p  L4-L5 TLIF, L4-S1 PSF on 5/13/24    #Low back pain S/p  L4-L5 TLIF, L4-S1 PSF on 5/13/24  #Post operative state   - Will continue pain control and weightbearing status as per ortho team    # Ileus (resolved)  - Pt is passing gas and had a bowel movement on 5/16  -Pt s/p NGT removal on 5/17  and can advance diet as tolerated   -Will avoid narcotics   - Will monitor electrolytes   -Will continue serial abdominal exams     # Acute Blood Loss Anemia   -Pt's H/H 8/24.2   -Will continue to monitor CBC and transfuse for Hgb < 7gm/dl   - Will check iron studies    #Diabetes  -Pt's HgbA1c 7.3   -Home medications: glimepiride and metformin currently on hold   -Endocrine following; Will continue Lantus 10 units qhs   -Will continue ISS and monitor FS and adjust Lantus as per Endocrine recommendation.     # HTN   -Will continue Lisinopril 20mg PO daily   -Will f/u BP and adjust antihypertensive as required     # HLD   -Continue Atorvastatin 40mg PO daily     #DVT PPx  -Continue Enoxaparin 40mg SQ daily     #DISPO  -Pt was seen by PT and recommended for DONNIE however PT stated that if the pt will go home then they will need home PT.   -As per Ortho     40 minutes spent on total encounter. The necessity of the time spent during the encounter on this date of service was due to:    Review of hospital course, labs, vitals, medical records.  Bedside exam and patient interview   Discussed plan of care with primary team as well as consultant.   Documenting the encounter.   74y old  Female w/ pmhx of Diabetes, HTN/HLD colon polyp p/w  low back pain now S/p  L4-L5 TLIF, L4-S1 PSF on 5/13/24    #Low back pain S/p  L4-L5 TLIF, L4-S1 PSF on 5/13/24  #Post operative state   - Will continue pain control and weightbearing status as per ortho team    # Ileus (resolved)  - Pt is passing gas and had a bowel movement on 5/16  -Pt s/p NGT removal on 5/17  and can advance diet as tolerated   -Will avoid narcotics   - Will monitor electrolytes   -Will continue serial abdominal exams     # Acute Blood Loss Anemia   -Pt's H/H 8/24.2   -Will continue to monitor CBC and transfuse for Hgb < 7gm/dl   - Will check iron studies    #Diabetes  -Pt's HgbA1c 7.3   -Home medications: glimepiride and metformin currently on hold   -Endocrine following; Will continue Lantus 8 units qhs   -Will continue ISS and monitor FS and adjust Lantus as per Endocrine recommendation.     # HTN   -Will continue Lisinopril 20mg PO daily   -Will f/u BP and adjust antihypertensive as required     # HLD   -Continue Atorvastatin 40mg PO daily     #DVT PPx  -Continue Enoxaparin 40mg SQ daily     #DISPO  -Pt was seen by PT and recommended for DONNIE however PT stated that if the pt will go home then they will need home PT.   -As per Ortho     40 minutes spent on total encounter. The necessity of the time spent during the encounter on this date of service was due to:    Review of hospital course, labs, vitals, medical records.  Bedside exam and patient interview   Discussed plan of care with primary team as well as consultant.   Documenting the encounter.

## 2024-05-18 NOTE — OCCUPATIONAL THERAPY INITIAL EVALUATION ADULT - GENERAL OBSERVATIONS, REHAB EVAL
Pt cleared by CELESTE Arteaga for OOB with OT. Pt received semisupine +NAD +tele +hep lock; pt left seated in chair with all needs met and lines intact, RN aware.

## 2024-05-18 NOTE — PROGRESS NOTE ADULT - SUBJECTIVE AND OBJECTIVE BOX
Patient was seen and examined at bedside. Case discuss with the primary team. Pt reports that she is passing gas and denied abdominal pain. Pt with back pain.     OBJECTIVE:  Vital Signs Last 24 Hrs  T(C): 36.6 (18 May 2024 04:40), Max: 37.8 (17 May 2024 21:56)  T(F): 97.9 (18 May 2024 04:40), Max: 100 (17 May 2024 21:56)  HR: 90 (18 May 2024 11:45) (84 - 108)  BP: 132/60 (18 May 2024 11:45) (124/57 - 166/69)  RR: 18 (18 May 2024 11:45) (17 - 18)  SpO2: 96% (18 May 2024 11:45) (91% - 96%)    Parameters below as of 18 May 2024 11:45  Patient On (Oxygen Delivery Method): room air    PHYSICAL EXAM:  Gen: NAD laying in bed  CV: RRR, +S1/S2, no mumur  Pulm: CTA b/l no wheezing or crackles   Abd: soft, NTND + BS no rebound or guarding   Neuro: AAOx3; sensation intact; strength 5/5 b/l UE and LE       LABS:                        8.0    6.01  )-----------( 265      ( 18 May 2024 05:30 )             24.2     05-18    139  |  105  |  7   ----------------------------<  118<H>  3.7   |  23  |  0.51    Ca    8.8      18 May 2024 05:30      CAPILLARY BLOOD GLUCOSE  POCT Blood Glucose.: 130 mg/dL (18 May 2024 11:23)  POCT Blood Glucose.: 120 mg/dL (18 May 2024 07:04)  POCT Blood Glucose.: 150 mg/dL (17 May 2024 21:42)  POCT Blood Glucose.: 104 mg/dL (17 May 2024 17:51)    aluminum hydroxide/magnesium hydroxide/simethicone Suspension 30 milliLiter(s) Oral every 12 hours PRN  atorvastatin 40 milliGRAM(s) Oral at bedtime  benzocaine/menthol Lozenge 1 Lozenge Oral every 2 hours PRN  bisacodyl 5 milliGRAM(s) Oral every 12 hours PRN  dextrose 10% Bolus 125 milliLiter(s) IV Bolus once  dextrose 5%. 1000 milliLiter(s) IV Continuous <Continuous>  dextrose 5%. 1000 milliLiter(s) IV Continuous <Continuous>  dextrose 50% Injectable 25 Gram(s) IV Push once  dextrose 50% Injectable 12.5 Gram(s) IV Push once  dextrose Oral Gel 15 Gram(s) Oral once PRN  diphenhydrAMINE 25 milliGRAM(s) Oral every 6 hours PRN  diphenhydrAMINE Injectable 50 milliGRAM(s) IV Push at bedtime PRN  enoxaparin Injectable 40 milliGRAM(s) SubCutaneous every 24 hours  gabapentin 600 milliGRAM(s) Oral every 8 hours  glucagon  Injectable 1 milliGRAM(s) IntraMuscular once  HYDROmorphone   Tablet 2 milliGRAM(s) Oral every 4 hours PRN  insulin glargine Injectable (LANTUS) 8 Unit(s) SubCutaneous at bedtime  insulin lispro (ADMELOG) corrective regimen sliding scale   SubCutaneous Before meals and at bedtime  lactated ringers. 1000 milliLiter(s) IV Continuous <Continuous>  lisinopril 20 milliGRAM(s) Oral daily  magnesium hydroxide Suspension 30 milliLiter(s) Oral every 12 hours PRN  melatonin 5 milliGRAM(s) Oral at bedtime PRN  morphine  - Injectable 2 milliGRAM(s) IV Push every 4 hours PRN  ondansetron Injectable 4 milliGRAM(s) IV Push every 6 hours PRN  pantoprazole  Injectable 40 milliGRAM(s) IV Push daily  polyethylene glycol 3350 17 Gram(s) Oral at bedtime  senna 2 Tablet(s) Oral at bedtime  SUMAtriptan 50 milliGRAM(s) Oral daily PRN

## 2024-05-18 NOTE — PROGRESS NOTE ADULT - SUBJECTIVE AND OBJECTIVE BOX
Ortho Note    Pt comfortable without complaints, pain controlled  Denies CP, SOB, N/V, new numbness/tingling     Vital Signs Last 24 Hrs  T(C): 36.8 (05-18-24 @ 20:17), Max: 36.8 (05-18-24 @ 20:17)  T(F): 98.3 (05-18-24 @ 20:17), Max: 98.3 (05-18-24 @ 20:17)  HR: 84 (05-18-24 @ 22:10) (84 - 86)  BP: 119/57 (05-18-24 @ 22:10) (119/57 - 143/65)  BP(mean): --  RR: 18 (05-18-24 @ 22:10) (18 - 18)  SpO2: 96% (05-18-24 @ 22:10) (95% - 96%)  I&O's Summary    17 May 2024 07:01  -  18 May 2024 07:00  --------------------------------------------------------  IN: 0 mL / OUT: 1775 mL / NET: -1775 mL    18 May 2024 07:01  -  18 May 2024 23:07  --------------------------------------------------------  IN: 0 mL / OUT: 700 mL / NET: -700 mL        General: Pt Alert and oriented, NAD  RUE  Sensation: SILT C5-T1   Motor:   C5 (deltoid abduction) 5/5   C6 (biceps flexion)  5/5   C7 (triceps extension) 5/5   C8 (finger flexion)  5/5   T1 (interosseous)  5/5    LUE  Sensation: SILT C5-T1  Motor:   C5 (deltoid abduction) 5/5   C6 (biceps flexion)  5/5   C7 (triceps extension) 5/5   C8 (finger flexion)  5/5   T1 (interosseous)  5/5     RLE   Sensation: SILT L2  Motor: L3-L5   L2 (hip flexion)  5/5   L3 (knee extension)  5/5   L4 (ankle dorsiflexion)  5/5   L5 (long toe extension) 5/5   S1 (ankle plantar flexion) 5/5      LLE  Sensation: SILT L2-S1  Motor:   L2 (hip flexion)  5/5   L3 (knee extension)  5/5   L4 (ankle dorsiflexion)  5/5   L5 (long toe extension) 5/5   S1 (ankle plantar flexion) 5/5  Reflexes: Normal and symmetric  Negative clonus. Down-going Babinski                            8.0    6.01  )-----------( 265      ( 18 May 2024 05:30 )             24.2     05-18    139  |  105  |  7   ----------------------------<  118<H>  3.7   |  23  |  0.51    Ca    8.8      18 May 2024 05:30        A/P: 74yFemale s/p L4-L5 TLIF, L4-S1 PSF on 5/13 with Dr. Evangelista.  - Stable, monitor labs/vs  - Pain Control with ERAS   - DVT ppx: SCDs, LVX  - Carito-op abx: Ancef   - PT, WBS: WBAT   - F/U AM Labs  dispo: hpt pend clearance    Ortho Pager [phone]

## 2024-05-18 NOTE — PROGRESS NOTE ADULT - ASSESSMENT
74F with PMHx of DMII, HTN, HLD, GERD chronic back pain and PSHx of hysteretomy appendectomy  who presented for elective L4-L5 TLIF and L4-S1 posterior spinal fusion with Dr. Evangelista on 5/13.   POD2 patient developed abominal distention with bilious emesis requiring NGT decompression. CTAP revealing SBO vs. ileus for which sugperry was consulted.  Assuming ileus given lack of transition point and smooth taper.    Recommendations:  - No acute surgical intervention at this time  - Can advance diet as tolerated  - Continue bowel regimen  -Team 4c will sign off please call for questions or concerns

## 2024-05-18 NOTE — OCCUPATIONAL THERAPY INITIAL EVALUATION ADULT - PERTINENT HX OF CURRENT PROBLEM, REHAB EVAL
74y old  Female w/ pmhx of Diabetes, HTN/HLD colon polyp p/w  low back pain now S/p  L4-L5 TLIF, L4-S1 PSF on 5/13/24

## 2024-05-18 NOTE — OCCUPATIONAL THERAPY INITIAL EVALUATION ADULT - ADDITIONAL COMMENTS
Pt r handed and wears glasses. Pt lives in apartment with 3 [address]NAL[address] alone with tub/shower. Pt ambulates with RW at baseline. Pt's granddaughter to assist upon discharge and provide 24 hr care.

## 2024-05-18 NOTE — PROGRESS NOTE ADULT - SUBJECTIVE AND OBJECTIVE BOX
STATUS POST:  Fusion, spine, lumbar, TLIF, 1-2 levels    POST OPERATIVE DAY #: 5    SUBJECTIVE: Patient examined bedside resting comfortably without acute complaints. Pt states she is feeling much better and feels less bloated. She is tolerating clear liquids without N/V. +F/+BM.    Vital Signs Last 24 Hrs  T(C): 36.6 (18 May 2024 04:40), Max: 37.8 (17 May 2024 21:56)  T(F): 97.9 (18 May 2024 04:40), Max: 100 (17 May 2024 21:56)  HR: 87 (18 May 2024 08:19) (84 - 108)  BP: 124/57 (18 May 2024 08:19) (124/57 - 166/69)  BP(mean): --  RR: 18 (18 May 2024 08:19) (17 - 18)  SpO2: 96% (18 May 2024 08:19) (91% - 98%)    Parameters below as of 18 May 2024 08:19  Patient On (Oxygen Delivery Method): room air        I&O's Summary    17 May 2024 07:01  -  18 May 2024 07:00  --------------------------------------------------------  IN: 0 mL / OUT: 1775 mL / NET: -1775 mL    18 May 2024 07:01  -  18 May 2024 11:36  --------------------------------------------------------  IN: 0 mL / OUT: 300 mL / NET: -300 mL        Physical Exam:  General Appearance: Appears well, NAD  Pulmonary: Nonlabored breathing, no respiratory distress  Cardiovascular: NSR  Abdomen: Soft, NTND  Extremities: WWP, SCD's in place     LABS:                        8.0    6.01  )-----------( 265      ( 18 May 2024 05:30 )             24.2     05-18    139  |  105  |  7   ----------------------------<  118<H>  3.7   |  23  |  0.51    Ca    8.8      18 May 2024 05:30        Urinalysis Basic - ( 18 May 2024 05:30 )    Color: x / Appearance: x / SG: x / pH: x  Gluc: 118 mg/dL / Ketone: x  / Bili: x / Urobili: x   Blood: x / Protein: x / Nitrite: x   Leuk Esterase: x / RBC: x / WBC x   Sq Epi: x / Non Sq Epi: x / Bacteria: x

## 2024-05-18 NOTE — OCCUPATIONAL THERAPY INITIAL EVALUATION ADULT - DIAGNOSIS, OT EVAL
Pt presents to [facility] with low back pain now s/p L4-5 TLIF L4-S1 PSF on 5/13. OT consulted 2/2 decreased strength and flexibility impacting ADL and functional mob.

## 2024-05-19 ENCOUNTER — TRANSCRIPTION ENCOUNTER (OUTPATIENT)
Age: 74
End: 2024-05-19

## 2024-05-19 VITALS
DIASTOLIC BLOOD PRESSURE: 87 MMHG | RESPIRATION RATE: 18 BRPM | HEART RATE: 86 BPM | TEMPERATURE: 99 F | SYSTOLIC BLOOD PRESSURE: 140 MMHG | OXYGEN SATURATION: 96 %

## 2024-05-19 LAB
ANION GAP SERPL CALC-SCNC: 12 MMOL/L — SIGNIFICANT CHANGE UP (ref 5–17)
BUN SERPL-MCNC: 10 MG/DL — SIGNIFICANT CHANGE UP (ref 7–23)
CALCIUM SERPL-MCNC: 9.3 MG/DL — SIGNIFICANT CHANGE UP (ref 8.4–10.5)
CHLORIDE SERPL-SCNC: 105 MMOL/L — SIGNIFICANT CHANGE UP (ref 96–108)
CO2 SERPL-SCNC: 23 MMOL/L — SIGNIFICANT CHANGE UP (ref 22–31)
CREAT SERPL-MCNC: 0.57 MG/DL — SIGNIFICANT CHANGE UP (ref 0.5–1.3)
EGFR: 95 ML/MIN/1.73M2 — SIGNIFICANT CHANGE UP
FERRITIN SERPL-MCNC: 89 NG/ML — SIGNIFICANT CHANGE UP (ref 13–330)
GLUCOSE BLDC GLUCOMTR-MCNC: 132 MG/DL — HIGH (ref 70–99)
GLUCOSE BLDC GLUCOMTR-MCNC: 165 MG/DL — HIGH (ref 70–99)
GLUCOSE BLDC GLUCOMTR-MCNC: 197 MG/DL — HIGH (ref 70–99)
GLUCOSE SERPL-MCNC: 140 MG/DL — HIGH (ref 70–99)
HCT VFR BLD CALC: 24.6 % — LOW (ref 34.5–45)
HGB BLD-MCNC: 8.1 G/DL — LOW (ref 11.5–15.5)
IRON SATN MFR SERPL: 12 % — LOW (ref 14–50)
IRON SATN MFR SERPL: 27 UG/DL — LOW (ref 30–160)
MCHC RBC-ENTMCNC: 31.2 PG — SIGNIFICANT CHANGE UP (ref 27–34)
MCHC RBC-ENTMCNC: 32.9 GM/DL — SIGNIFICANT CHANGE UP (ref 32–36)
MCV RBC AUTO: 94.6 FL — SIGNIFICANT CHANGE UP (ref 80–100)
NRBC # BLD: 0 /100 WBCS — SIGNIFICANT CHANGE UP (ref 0–0)
PLATELET # BLD AUTO: 281 K/UL — SIGNIFICANT CHANGE UP (ref 150–400)
POTASSIUM SERPL-MCNC: 3.9 MMOL/L — SIGNIFICANT CHANGE UP (ref 3.5–5.3)
POTASSIUM SERPL-SCNC: 3.9 MMOL/L — SIGNIFICANT CHANGE UP (ref 3.5–5.3)
RBC # BLD: 2.6 M/UL — LOW (ref 3.8–5.2)
RBC # FLD: 14 % — SIGNIFICANT CHANGE UP (ref 10.3–14.5)
SODIUM SERPL-SCNC: 140 MMOL/L — SIGNIFICANT CHANGE UP (ref 135–145)
TIBC SERPL-MCNC: 217 UG/DL — LOW (ref 220–430)
TRANSFERRIN SERPL-MCNC: 175 MG/DL — LOW (ref 200–360)
TROPONIN T, HIGH SENSITIVITY RESULT: 10 NG/L — SIGNIFICANT CHANGE UP (ref 0–51)
TROPONIN T, HIGH SENSITIVITY RESULT: 9 NG/L — SIGNIFICANT CHANGE UP (ref 0–51)
UIBC SERPL-MCNC: 190 UG/DL — SIGNIFICANT CHANGE UP (ref 110–370)
WBC # BLD: 6.23 K/UL — SIGNIFICANT CHANGE UP (ref 3.8–10.5)
WBC # FLD AUTO: 6.23 K/UL — SIGNIFICANT CHANGE UP (ref 3.8–10.5)

## 2024-05-19 PROCEDURE — 74177 CT ABD & PELVIS W/CONTRAST: CPT | Mod: MC

## 2024-05-19 PROCEDURE — 84100 ASSAY OF PHOSPHORUS: CPT

## 2024-05-19 PROCEDURE — 82803 BLOOD GASES ANY COMBINATION: CPT

## 2024-05-19 PROCEDURE — 81001 URINALYSIS AUTO W/SCOPE: CPT

## 2024-05-19 PROCEDURE — C1889: CPT

## 2024-05-19 PROCEDURE — 82330 ASSAY OF CALCIUM: CPT

## 2024-05-19 PROCEDURE — 82550 ASSAY OF CK (CPK): CPT

## 2024-05-19 PROCEDURE — 83605 ASSAY OF LACTIC ACID: CPT

## 2024-05-19 PROCEDURE — 82962 GLUCOSE BLOOD TEST: CPT

## 2024-05-19 PROCEDURE — 80053 COMPREHEN METABOLIC PANEL: CPT

## 2024-05-19 PROCEDURE — 76000 FLUOROSCOPY <1 HR PHYS/QHP: CPT

## 2024-05-19 PROCEDURE — 83550 IRON BINDING TEST: CPT

## 2024-05-19 PROCEDURE — 84295 ASSAY OF SERUM SODIUM: CPT

## 2024-05-19 PROCEDURE — 71045 X-RAY EXAM CHEST 1 VIEW: CPT | Mod: 26

## 2024-05-19 PROCEDURE — 72100 X-RAY EXAM L-S SPINE 2/3 VWS: CPT

## 2024-05-19 PROCEDURE — C1713: CPT

## 2024-05-19 PROCEDURE — P9045: CPT

## 2024-05-19 PROCEDURE — 85730 THROMBOPLASTIN TIME PARTIAL: CPT

## 2024-05-19 PROCEDURE — 83615 LACTATE (LD) (LDH) ENZYME: CPT

## 2024-05-19 PROCEDURE — 36415 COLL VENOUS BLD VENIPUNCTURE: CPT

## 2024-05-19 PROCEDURE — 99232 SBSQ HOSP IP/OBS MODERATE 35: CPT

## 2024-05-19 PROCEDURE — 84484 ASSAY OF TROPONIN QUANT: CPT

## 2024-05-19 PROCEDURE — 86901 BLOOD TYPING SEROLOGIC RH(D): CPT

## 2024-05-19 PROCEDURE — 86850 RBC ANTIBODY SCREEN: CPT

## 2024-05-19 PROCEDURE — 97161 PT EVAL LOW COMPLEX 20 MIN: CPT

## 2024-05-19 PROCEDURE — 97530 THERAPEUTIC ACTIVITIES: CPT

## 2024-05-19 PROCEDURE — 86900 BLOOD TYPING SEROLOGIC ABO: CPT

## 2024-05-19 PROCEDURE — 85025 COMPLETE CBC W/AUTO DIFF WBC: CPT

## 2024-05-19 PROCEDURE — 83036 HEMOGLOBIN GLYCOSYLATED A1C: CPT

## 2024-05-19 PROCEDURE — 74018 RADEX ABDOMEN 1 VIEW: CPT

## 2024-05-19 PROCEDURE — 85610 PROTHROMBIN TIME: CPT

## 2024-05-19 PROCEDURE — 71045 X-RAY EXAM CHEST 1 VIEW: CPT

## 2024-05-19 PROCEDURE — 83540 ASSAY OF IRON: CPT

## 2024-05-19 PROCEDURE — 84132 ASSAY OF SERUM POTASSIUM: CPT

## 2024-05-19 PROCEDURE — 82728 ASSAY OF FERRITIN: CPT

## 2024-05-19 PROCEDURE — 71275 CT ANGIOGRAPHY CHEST: CPT | Mod: MC

## 2024-05-19 PROCEDURE — 84466 ASSAY OF TRANSFERRIN: CPT

## 2024-05-19 PROCEDURE — 80048 BASIC METABOLIC PNL TOTAL CA: CPT

## 2024-05-19 PROCEDURE — 97165 OT EVAL LOW COMPLEX 30 MIN: CPT

## 2024-05-19 PROCEDURE — 85027 COMPLETE CBC AUTOMATED: CPT

## 2024-05-19 PROCEDURE — 83735 ASSAY OF MAGNESIUM: CPT

## 2024-05-19 PROCEDURE — 97116 GAIT TRAINING THERAPY: CPT

## 2024-05-19 RX ORDER — SUMATRIPTAN SUCCINATE 4 MG/.5ML
1 INJECTION, SOLUTION SUBCUTANEOUS
Qty: 0 | Refills: 0 | DISCHARGE

## 2024-05-19 RX ADMIN — Medication 2: at 22:41

## 2024-05-19 RX ADMIN — ATORVASTATIN CALCIUM 40 MILLIGRAM(S): 80 TABLET, FILM COATED ORAL at 22:04

## 2024-05-19 RX ADMIN — PANTOPRAZOLE SODIUM 40 MILLIGRAM(S): 20 TABLET, DELAYED RELEASE ORAL at 11:35

## 2024-05-19 RX ADMIN — ENOXAPARIN SODIUM 40 MILLIGRAM(S): 100 INJECTION SUBCUTANEOUS at 22:04

## 2024-05-19 RX ADMIN — Medication 2: at 13:30

## 2024-05-19 RX ADMIN — SENNA PLUS 2 TABLET(S): 8.6 TABLET ORAL at 22:05

## 2024-05-19 RX ADMIN — POLYETHYLENE GLYCOL 3350 17 GRAM(S): 17 POWDER, FOR SOLUTION ORAL at 22:04

## 2024-05-19 RX ADMIN — LISINOPRIL 20 MILLIGRAM(S): 2.5 TABLET ORAL at 05:52

## 2024-05-19 RX ADMIN — GABAPENTIN 600 MILLIGRAM(S): 400 CAPSULE ORAL at 22:05

## 2024-05-19 RX ADMIN — HYDROMORPHONE HYDROCHLORIDE 2 MILLIGRAM(S): 2 INJECTION INTRAMUSCULAR; INTRAVENOUS; SUBCUTANEOUS at 09:39

## 2024-05-19 RX ADMIN — Medication 5 MILLIGRAM(S): at 00:30

## 2024-05-19 RX ADMIN — GABAPENTIN 600 MILLIGRAM(S): 400 CAPSULE ORAL at 05:52

## 2024-05-19 RX ADMIN — GABAPENTIN 600 MILLIGRAM(S): 400 CAPSULE ORAL at 13:33

## 2024-05-19 RX ADMIN — HYDROMORPHONE HYDROCHLORIDE 2 MILLIGRAM(S): 2 INJECTION INTRAMUSCULAR; INTRAVENOUS; SUBCUTANEOUS at 10:23

## 2024-05-19 RX ADMIN — INSULIN GLARGINE 8 UNIT(S): 100 INJECTION, SOLUTION SUBCUTANEOUS at 22:42

## 2024-05-19 RX ADMIN — HYDROMORPHONE HYDROCHLORIDE 2 MILLIGRAM(S): 2 INJECTION INTRAMUSCULAR; INTRAVENOUS; SUBCUTANEOUS at 19:15

## 2024-05-19 NOTE — PROGRESS NOTE ADULT - PROVIDER SPECIALTY LIST ADULT
Endocrinology
Hospitalist
Hospitalist
Orthopedics
Orthopedics
Surgery
Hospitalist
Orthopedics
Surgery
Endocrinology
Surgery

## 2024-05-19 NOTE — PROGRESS NOTE ADULT - REASON FOR ADMISSION
low back pain

## 2024-05-19 NOTE — PROGRESS NOTE ADULT - ASSESSMENT
74y old  Female w/ pmhx of Diabetes, HTN/HLD colon polyp p/w  low back pain now S/p  L4-L5 TLIF, L4-S1 PSF on 5/13/24    #Low back pain S/p  L4-L5 TLIF, L4-S1 PSF on 5/13/24  #Post operative state   - Will continue pain control and bowel regimen as per ortho team    # Ileus (resolved)  - Pt is passing gas and had a bowel movement on 5/16  -Pt s/p NGT removal on 5/17  and tolerating diet   -Will avoid narcotics   -Will continue serial abdominal exams     #Chest pain   -Likely musculoskeletal and reproducible on exam   -Pt with nl EKG   -Will  f/u 2nd troponin     # Acute Blood Loss Anemia   -Pt's H/H 8.1/24.6    -Will continue to monitor CBC and transfuse for Hgb < 7gm/dl     #Diabetes  -Pt's HgbA1c 7.3   -Home medications: glimepiride and metformin currently on hold   -Endocrine following; Will continue Lantus 8 units qhs   -Will continue ISS and monitor FS and adjust Lantus as per Endocrine recommendation.     # HTN   -Will continue Lisinopril 20mg PO daily   -Will f/u BP and adjust antihypertensive as required     # HLD   -Continue Atorvastatin 40mg PO daily     #DVT PPx  -Continue Enoxaparin 40mg SQ daily     #DISPO  -Pt was seen by PT and recommended for DONNIE however PT stated that if the pt will go home then they will need home PT.   -As per Ortho     40 minutes spent on total encounter. The necessity of the time spent during the encounter on this date of service was due to:    Review of hospital course, labs, vitals, medical records.  Bedside exam and patient interview   Discussed plan of care with primary team as well as consultant.   Documenting the encounter. 74y old  Female w/ pmhx of Diabetes, HTN/HLD colon polyp p/w  low back pain now S/p  L4-L5 TLIF, L4-S1 PSF on 5/13/24    #Low back pain S/p  L4-L5 TLIF, L4-S1 PSF on 5/13/24  #Post operative state   - Will continue pain control and bowel regimen as per ortho team    # Ileus (resolved)  - Pt is passing gas and had a bowel movement on 5/16  -Pt s/p NGT removal on 5/17  and tolerating diet   -Will avoid narcotics   -Will continue serial abdominal exams     #Chest pain   -Likely musculoskeletal and reproducible on exam   -Pt with nl EKG   -Will  f/u 2nd troponin     #  Anemia of chronic disease   -Pt's H/H 8.1/24.6    -Will continue to monitor CBC and transfuse for Hgb < 7gm/dl     #Diabetes  -Pt's HgbA1c 7.3   -Home medications: glimepiride and metformin currently on hold   -Endocrine following; Will continue Lantus 8 units qhs   -Will continue ISS and monitor FS and adjust Lantus as per Endocrine recommendation.     # HTN   -Will continue Lisinopril 20mg PO daily   -Will f/u BP and adjust antihypertensive as required     # HLD   -Continue Atorvastatin 40mg PO daily     #DVT PPx  -Continue Enoxaparin 40mg SQ daily     #DISPO  -Pt was seen by PT and recommended for DONNIE however PT stated that if the pt will go home then they will need home PT.   -As per Ortho     40 minutes spent on total encounter. The necessity of the time spent during the encounter on this date of service was due to:    Review of hospital course, labs, vitals, medical records.  Bedside exam and patient interview   Discussed plan of care with primary team as well as consultant.   Documenting the encounter.

## 2024-05-19 NOTE — DISCHARGE NOTE NURSING/CASE MANAGEMENT/SOCIAL WORK - NSSCNAMETXT_GEN_ALL_CORE
[facility] at home (pending agency review and acceptance) [facility] at home

## 2024-05-19 NOTE — PROGRESS NOTE ADULT - SUBJECTIVE AND OBJECTIVE BOX
Ortho Note    Pt comfortable without complaints, pain controlled  Denies CP, SOB, N/V, new numbness/tingling     Vital Signs Last 24 Hrs  T(C): 36.7 (19 May 2024 05:51), Max: 36.8 (18 May 2024 20:17)  T(F): 98 (19 May 2024 05:51), Max: 98.3 (18 May 2024 20:17)  HR: 82 (19 May 2024 05:51) (82 - 90)  BP: 142/65 (19 May 2024 05:51) (119/57 - 143/65)  BP(mean): --  RR: 18 (19 May 2024 05:51) (18 - 18)  SpO2: 94% (19 May 2024 05:51) (94% - 96%)    Parameters below as of 19 May 2024 05:51  Patient On (Oxygen Delivery Method): room air          General: Pt Alert and oriented, NAD  RUE  Sensation: SILT C5-T1   Motor:   C5 (deltoid abduction) 5/5   C6 (biceps flexion)  5/5   C7 (triceps extension) 5/5   C8 (finger flexion)  5/5   T1 (interosseous)  5/5    LUE  Sensation: SILT C5-T1  Motor:   C5 (deltoid abduction) 5/5   C6 (biceps flexion)  5/5   C7 (triceps extension) 5/5   C8 (finger flexion)  5/5   T1 (interosseous)  5/5     RLE   Sensation: SILT L2  Motor: L3-L5   L2 (hip flexion)  5/5   L3 (knee extension)  5/5   L4 (ankle dorsiflexion)  5/5   L5 (long toe extension) 5/5   S1 (ankle plantar flexion) 5/5      LLE  Sensation: SILT L2-S1  Motor:   L2 (hip flexion)  5/5   L3 (knee extension)  5/5   L4 (ankle dorsiflexion)  5/5   L5 (long toe extension) 5/5   S1 (ankle plantar flexion) 5/5  Reflexes: Normal and symmetric  Negative clonus. Down-going Babinski        LABS:                          8.0    6.01  )-----------( 265      ( 18 May 2024 05:30 )             24.2     05-18    139  |  105  |  7   ----------------------------<  118<H>  3.7   |  23  |  0.51    Ca    8.8      18 May 2024 05:30          Urinalysis Basic - ( 18 May 2024 05:30 )    Color: x / Appearance: x / SG: x / pH: x  Gluc: 118 mg/dL / Ketone: x  / Bili: x / Urobili: x   Blood: x / Protein: x / Nitrite: x   Leuk Esterase: x / RBC: x / WBC x   Sq Epi: x / Non Sq Epi: x / Bacteria: x            A/P: 74yFemale s/p L4-L5 TLIF, L4-S1 PSF on 5/13 with Dr. Evangelista.  - Stable, monitor labs/vs  - Pain Control with ERAS   - DVT ppx: SCDs, LVX  - Carito-op abx: Ancef   - PT, WBS: WBAT   - F/U AM Labs  dispo: hpt pend clearance    Ortho Pager [phone]

## 2024-05-19 NOTE — DISCHARGE NOTE NURSING/CASE MANAGEMENT/SOCIAL WORK - PATIENT PORTAL LINK FT
You can access the FollowMyHealth Patient Portal offered by [facility] by registering at the following website: [email or web address]. By joining [employer] [facility]’s FollowMyHealth portal, you will also be able to view your health information using other applications (apps) compatible with our system.

## 2024-05-19 NOTE — PROVIDER CONTACT NOTE (OTHER) - ACTION/TREATMENT ORDERED:
Provider aware, no orders given
Quinton Mehta MD made aware. MD Janine verbalized he will order troponin, EKG and chest xray.

## 2024-05-19 NOTE — PROGRESS NOTE ADULT - SUBJECTIVE AND OBJECTIVE BOX
Patient was seen and examined at bedside. Case discuss with the primary team. Pt with episode of chest discomfort last night and this morning. Pt denied having shortness of breath.     OBJECTIVE:  Vital Signs Last 24 Hrs  T(C): 36.7 (19 May 2024 08:20), Max: 36.8 (18 May 2024 20:17)  T(F): 98.1 (19 May 2024 08:20), Max: 98.3 (18 May 2024 20:17)  HR: 106 (19 May 2024 10:57) (82 - 106)  BP: 139/60 (19 May 2024 10:57) (107/59 - 166/69)  RR: 18 (19 May 2024 08:20) (18 - 20)  SpO2: 98% (19 May 2024 10:57) (94% - 98%)    Parameters below as of 19 May 2024 10:57  Patient On (Oxygen Delivery Method): room air    PHYSICAL EXAM:  Gen: NAD laying in bed  CV: RRR, +S1/S2, no mumur + chest wall tenderness   Pulm: CTA b/l no wheezing or crackles   Abd: soft, NTND + BS no rebound or guarding       LABS:                        8.1    6.23  )-----------( 281      ( 19 May 2024 05:30 )             24.6     05-19    140  |  105  |  10  ----------------------------<  140<H>  3.9   |  23  |  0.57    Ca    9.3      19 May 2024 05:30    Troponin 10   EKG: NSR @79bpm no ST changes     CAPILLARY BLOOD GLUCOSE  POCT Blood Glucose.: 165 mg/dL (19 May 2024 12:33)  POCT Blood Glucose.: 132 mg/dL (19 May 2024 07:44)  POCT Blood Glucose.: 155 mg/dL (18 May 2024 22:07)  POCT Blood Glucose.: 136 mg/dL (18 May 2024 16:59)    iron: 27   TIBC 217  ferritin 89  transferrin 175     aluminum hydroxide/magnesium hydroxide/simethicone Suspension 30 milliLiter(s) Oral every 12 hours PRN  atorvastatin 40 milliGRAM(s) Oral at bedtime  benzocaine/menthol Lozenge 1 Lozenge Oral every 2 hours PRN  bisacodyl 5 milliGRAM(s) Oral every 12 hours PRN  diphenhydrAMINE 25 milliGRAM(s) Oral every 6 hours PRN  diphenhydrAMINE Injectable 50 milliGRAM(s) IV Push at bedtime PRN  enoxaparin Injectable 40 milliGRAM(s) SubCutaneous every 24 hours  gabapentin 600 milliGRAM(s) Oral every 8 hours  glucagon  Injectable 1 milliGRAM(s) IntraMuscular once  HYDROmorphone   Tablet 2 milliGRAM(s) Oral every 4 hours PRN  insulin glargine Injectable (LANTUS) 8 Unit(s) SubCutaneous at bedtime  insulin lispro (ADMELOG) corrective regimen sliding scale   SubCutaneous Before meals and at bedtime  lactated ringers. 1000 milliLiter(s) IV Continuous <Continuous>  lisinopril 20 milliGRAM(s) Oral daily  magnesium hydroxide Suspension 30 milliLiter(s) Oral every 12 hours PRN  melatonin 5 milliGRAM(s) Oral at bedtime PRN  morphine  - Injectable 2 milliGRAM(s) IV Push every 4 hours PRN  ondansetron Injectable 4 milliGRAM(s) IV Push every 6 hours PRN  pantoprazole  Injectable 40 milliGRAM(s) IV Push daily  polyethylene glycol 3350 17 Gram(s) Oral at bedtime  senna 2 Tablet(s) Oral at bedtime  SUMAtriptan 50 milliGRAM(s) Oral daily PRN                     Patient was seen and examined at bedside. Case discuss with the primary team. Pt with episode of chest discomfort last night and this morning. Pt denied having shortness of breath.     OBJECTIVE:  Vital Signs Last 24 Hrs  T(C): 36.7 (19 May 2024 08:20), Max: 36.8 (18 May 2024 20:17)  T(F): 98.1 (19 May 2024 08:20), Max: 98.3 (18 May 2024 20:17)  HR: 106 (19 May 2024 10:57) (82 - 106)  BP: 139/60 (19 May 2024 10:57) (107/59 - 166/69)  RR: 18 (19 May 2024 08:20) (18 - 20)  SpO2: 98% (19 May 2024 10:57) (94% - 98%)    Parameters below as of 19 May 2024 10:57  Patient On (Oxygen Delivery Method): room air    PHYSICAL EXAM:  Gen: NAD laying in bed  CV: RRR, +S1/S2, no mumur + chest wall tenderness   Pulm: CTA b/l no wheezing or crackles   Abd: soft, NTND + BS no rebound or guarding       LABS:                        8.1    6.23  )-----------( 281      ( 19 May 2024 05:30 )             24.6     05-19    140  |  105  |  10  ----------------------------<  140<H>  3.9   |  23  |  0.57    Ca    9.3      19 May 2024 05:30    Troponin 10   EKG: NSR @79bpm no ST changes     CAPILLARY BLOOD GLUCOSE  POCT Blood Glucose.: 165 mg/dL (19 May 2024 12:33)  POCT Blood Glucose.: 132 mg/dL (19 May 2024 07:44)  POCT Blood Glucose.: 155 mg/dL (18 May 2024 22:07)  POCT Blood Glucose.: 136 mg/dL (18 May 2024 16:59)    Iron: 27   TIBC 217  Ferritin 89  Transferrin 175     aluminum hydroxide/magnesium hydroxide/simethicone Suspension 30 milliLiter(s) Oral every 12 hours PRN  atorvastatin 40 milliGRAM(s) Oral at bedtime  benzocaine/menthol Lozenge 1 Lozenge Oral every 2 hours PRN  bisacodyl 5 milliGRAM(s) Oral every 12 hours PRN  diphenhydrAMINE 25 milliGRAM(s) Oral every 6 hours PRN  diphenhydrAMINE Injectable 50 milliGRAM(s) IV Push at bedtime PRN  enoxaparin Injectable 40 milliGRAM(s) SubCutaneous every 24 hours  gabapentin 600 milliGRAM(s) Oral every 8 hours  glucagon  Injectable 1 milliGRAM(s) IntraMuscular once  HYDROmorphone   Tablet 2 milliGRAM(s) Oral every 4 hours PRN  insulin glargine Injectable (LANTUS) 8 Unit(s) SubCutaneous at bedtime  insulin lispro (ADMELOG) corrective regimen sliding scale   SubCutaneous Before meals and at bedtime  lactated ringers. 1000 milliLiter(s) IV Continuous <Continuous>  lisinopril 20 milliGRAM(s) Oral daily  magnesium hydroxide Suspension 30 milliLiter(s) Oral every 12 hours PRN  melatonin 5 milliGRAM(s) Oral at bedtime PRN  morphine  - Injectable 2 milliGRAM(s) IV Push every 4 hours PRN  ondansetron Injectable 4 milliGRAM(s) IV Push every 6 hours PRN  pantoprazole  Injectable 40 milliGRAM(s) IV Push daily  polyethylene glycol 3350 17 Gram(s) Oral at bedtime  senna 2 Tablet(s) Oral at bedtime  SUMAtriptan 50 milliGRAM(s) Oral daily PRN

## 2024-05-30 DIAGNOSIS — I10 ESSENTIAL (PRIMARY) HYPERTENSION: ICD-10-CM

## 2024-05-30 DIAGNOSIS — R25.1 TREMOR, UNSPECIFIED: ICD-10-CM

## 2024-05-30 DIAGNOSIS — K91.89 OTHER POSTPROCEDURAL COMPLICATIONS AND DISORDERS OF DIGESTIVE SYSTEM: ICD-10-CM

## 2024-05-30 DIAGNOSIS — T40.2X5A ADVERSE EFFECT OF OTHER OPIOIDS, INITIAL ENCOUNTER: ICD-10-CM

## 2024-05-30 DIAGNOSIS — D63.8 ANEMIA IN OTHER CHRONIC DISEASES CLASSIFIED ELSEWHERE: ICD-10-CM

## 2024-05-30 DIAGNOSIS — R07.89 OTHER CHEST PAIN: ICD-10-CM

## 2024-05-30 DIAGNOSIS — R00.1 BRADYCARDIA, UNSPECIFIED: ICD-10-CM

## 2024-05-30 DIAGNOSIS — Z98.1 ARTHRODESIS STATUS: ICD-10-CM

## 2024-05-30 DIAGNOSIS — M43.17 SPONDYLOLISTHESIS, LUMBOSACRAL REGION: ICD-10-CM

## 2024-05-30 DIAGNOSIS — H53.8 OTHER VISUAL DISTURBANCES: ICD-10-CM

## 2024-05-30 DIAGNOSIS — M19.90 UNSPECIFIED OSTEOARTHRITIS, UNSPECIFIED SITE: ICD-10-CM

## 2024-05-30 DIAGNOSIS — E87.20 ACIDOSIS, UNSPECIFIED: ICD-10-CM

## 2024-05-30 DIAGNOSIS — T43.015A ADVERSE EFFECT OF TRICYCLIC ANTIDEPRESSANTS, INITIAL ENCOUNTER: ICD-10-CM

## 2024-05-30 DIAGNOSIS — R11.14 BILIOUS VOMITING: ICD-10-CM

## 2024-05-30 DIAGNOSIS — K56.600 PARTIAL INTESTINAL OBSTRUCTION, UNSPECIFIED AS TO CAUSE: ICD-10-CM

## 2024-05-30 DIAGNOSIS — Z79.84 LONG TERM (CURRENT) USE OF ORAL HYPOGLYCEMIC DRUGS: ICD-10-CM

## 2024-05-30 DIAGNOSIS — K56.7 ILEUS, UNSPECIFIED: ICD-10-CM

## 2024-05-30 DIAGNOSIS — E78.5 HYPERLIPIDEMIA, UNSPECIFIED: ICD-10-CM

## 2024-05-30 DIAGNOSIS — M48.061 SPINAL STENOSIS, LUMBAR REGION WITHOUT NEUROGENIC CLAUDICATION: ICD-10-CM

## 2024-05-30 DIAGNOSIS — E11.40 TYPE 2 DIABETES MELLITUS WITH DIABETIC NEUROPATHY, UNSPECIFIED: ICD-10-CM

## 2024-05-30 DIAGNOSIS — K59.03 DRUG INDUCED CONSTIPATION: ICD-10-CM

## 2024-05-30 DIAGNOSIS — Z88.8 ALLERGY STATUS TO OTHER DRUGS, MEDICAMENTS AND BIOLOGICAL SUBSTANCES: ICD-10-CM

## 2024-05-30 DIAGNOSIS — F41.9 ANXIETY DISORDER, UNSPECIFIED: ICD-10-CM

## 2024-05-30 DIAGNOSIS — E11.65 TYPE 2 DIABETES MELLITUS WITH HYPERGLYCEMIA: ICD-10-CM

## 2024-05-30 DIAGNOSIS — K21.9 GASTRO-ESOPHAGEAL REFLUX DISEASE WITHOUT ESOPHAGITIS: ICD-10-CM

## 2024-05-30 DIAGNOSIS — Z88.5 ALLERGY STATUS TO NARCOTIC AGENT: ICD-10-CM

## 2024-08-25 NOTE — BRIEF OPERATIVE NOTE - NSICDXBRIEFPREOP_GEN_ALL_CORE_FT
PRE-OP DIAGNOSIS:  Lumbar stenosis 13-May-2024 12:03:44  Jose Gilbert  
PAST SURGICAL HISTORY:  S/P resection of meningioma

## 2025-02-10 PROBLEM — K63.5 POLYP OF COLON: Chronic | Status: ACTIVE | Noted: 2021-01-29

## 2025-02-10 PROBLEM — K21.9 GASTRO-ESOPHAGEAL REFLUX DISEASE WITHOUT ESOPHAGITIS: Chronic | Status: ACTIVE | Noted: 2024-05-10

## 2025-02-10 PROBLEM — Z87.898 PERSONAL HISTORY OF OTHER SPECIFIED CONDITIONS: Chronic | Status: ACTIVE | Noted: 2024-05-10

## 2025-02-10 PROBLEM — M19.90 UNSPECIFIED OSTEOARTHRITIS, UNSPECIFIED SITE: Chronic | Status: ACTIVE | Noted: 2024-05-10

## 2025-04-10 ENCOUNTER — INPATIENT (INPATIENT)
Facility: HOSPITAL | Age: 75
LOS: 1 days | Discharge: HOME | DRG: 552 | End: 2025-04-12
Attending: ORTHOPAEDIC SURGERY | Admitting: ORTHOPAEDIC SURGERY
Payer: MEDICARE

## 2025-04-10 ENCOUNTER — APPOINTMENT (OUTPATIENT)
Dept: RHEUMATOLOGY | Facility: CLINIC | Age: 75
End: 2025-04-10

## 2025-04-10 VITALS
TEMPERATURE: 98 F | HEART RATE: 100 BPM | SYSTOLIC BLOOD PRESSURE: 148 MMHG | HEIGHT: 66 IN | DIASTOLIC BLOOD PRESSURE: 63 MMHG | WEIGHT: 143.96 LBS | RESPIRATION RATE: 18 BRPM

## 2025-04-10 DIAGNOSIS — Z90.49 ACQUIRED ABSENCE OF OTHER SPECIFIED PARTS OF DIGESTIVE TRACT: Chronic | ICD-10-CM

## 2025-04-10 DIAGNOSIS — Z98.890 OTHER SPECIFIED POSTPROCEDURAL STATES: Chronic | ICD-10-CM

## 2025-04-10 DIAGNOSIS — Z98.51 TUBAL LIGATION STATUS: Chronic | ICD-10-CM

## 2025-04-10 DIAGNOSIS — Z90.710 ACQUIRED ABSENCE OF BOTH CERVIX AND UTERUS: Chronic | ICD-10-CM

## 2025-04-10 LAB
ANION GAP SERPL CALC-SCNC: 15 MMOL/L — SIGNIFICANT CHANGE UP (ref 5–17)
APPEARANCE UR: CLEAR — SIGNIFICANT CHANGE UP
APTT BLD: 29.7 SEC — SIGNIFICANT CHANGE UP (ref 24.5–35.6)
BASOPHILS # BLD AUTO: 0.04 K/UL — SIGNIFICANT CHANGE UP (ref 0–0.2)
BASOPHILS NFR BLD AUTO: 0.4 % — SIGNIFICANT CHANGE UP (ref 0–2)
BILIRUB UR-MCNC: NEGATIVE — SIGNIFICANT CHANGE UP
BLD GP AB SCN SERPL QL: NEGATIVE — SIGNIFICANT CHANGE UP
BUN SERPL-MCNC: 13 MG/DL — SIGNIFICANT CHANGE UP (ref 7–23)
CALCIUM SERPL-MCNC: 10.3 MG/DL — SIGNIFICANT CHANGE UP (ref 8.4–10.5)
CHLORIDE SERPL-SCNC: 101 MMOL/L — SIGNIFICANT CHANGE UP (ref 96–108)
CO2 SERPL-SCNC: 21 MMOL/L — LOW (ref 22–31)
COLOR SPEC: YELLOW — SIGNIFICANT CHANGE UP
CREAT SERPL-MCNC: 0.71 MG/DL — SIGNIFICANT CHANGE UP (ref 0.5–1.3)
DIFF PNL FLD: NEGATIVE — SIGNIFICANT CHANGE UP
EGFR: 88 ML/MIN/1.73M2 — SIGNIFICANT CHANGE UP
EGFR: 88 ML/MIN/1.73M2 — SIGNIFICANT CHANGE UP
EOSINOPHIL # BLD AUTO: 0.08 K/UL — SIGNIFICANT CHANGE UP (ref 0–0.5)
EOSINOPHIL NFR BLD AUTO: 0.9 % — SIGNIFICANT CHANGE UP (ref 0–6)
GLUCOSE SERPL-MCNC: 212 MG/DL — HIGH (ref 70–99)
GLUCOSE UR QL: NEGATIVE MG/DL — SIGNIFICANT CHANGE UP
HCT VFR BLD CALC: 32.3 % — LOW (ref 34.5–45)
HGB BLD-MCNC: 10.2 G/DL — LOW (ref 11.5–15.5)
IMM GRANULOCYTES NFR BLD AUTO: 0.4 % — SIGNIFICANT CHANGE UP (ref 0–0.9)
INR BLD: 0.93 — SIGNIFICANT CHANGE UP (ref 0.85–1.16)
KETONES UR-MCNC: NEGATIVE MG/DL — SIGNIFICANT CHANGE UP
LEUKOCYTE ESTERASE UR-ACNC: ABNORMAL
LYMPHOCYTES # BLD AUTO: 2.97 K/UL — SIGNIFICANT CHANGE UP (ref 1–3.3)
LYMPHOCYTES # BLD AUTO: 32 % — SIGNIFICANT CHANGE UP (ref 13–44)
MCHC RBC-ENTMCNC: 25.7 PG — LOW (ref 27–34)
MCHC RBC-ENTMCNC: 31.6 G/DL — LOW (ref 32–36)
MCV RBC AUTO: 81.4 FL — SIGNIFICANT CHANGE UP (ref 80–100)
MONOCYTES # BLD AUTO: 0.64 K/UL — SIGNIFICANT CHANGE UP (ref 0–0.9)
MONOCYTES NFR BLD AUTO: 6.9 % — SIGNIFICANT CHANGE UP (ref 2–14)
NEUTROPHILS # BLD AUTO: 5.5 K/UL — SIGNIFICANT CHANGE UP (ref 1.8–7.4)
NEUTROPHILS NFR BLD AUTO: 59.4 % — SIGNIFICANT CHANGE UP (ref 43–77)
NITRITE UR-MCNC: NEGATIVE — SIGNIFICANT CHANGE UP
NRBC BLD AUTO-RTO: 0 /100 WBCS — SIGNIFICANT CHANGE UP (ref 0–0)
PH UR: 6 — SIGNIFICANT CHANGE UP (ref 5–8)
PLATELET # BLD AUTO: 395 K/UL — SIGNIFICANT CHANGE UP (ref 150–400)
POTASSIUM SERPL-MCNC: 4.4 MMOL/L — SIGNIFICANT CHANGE UP (ref 3.5–5.3)
POTASSIUM SERPL-SCNC: 4.4 MMOL/L — SIGNIFICANT CHANGE UP (ref 3.5–5.3)
PROT UR-MCNC: NEGATIVE MG/DL — SIGNIFICANT CHANGE UP
PROTHROM AB SERPL-ACNC: 10.9 SEC — SIGNIFICANT CHANGE UP (ref 9.9–13.4)
RBC # BLD: 3.97 M/UL — SIGNIFICANT CHANGE UP (ref 3.8–5.2)
RBC # FLD: 18.6 % — HIGH (ref 10.3–14.5)
RH IG SCN BLD-IMP: POSITIVE — SIGNIFICANT CHANGE UP
SODIUM SERPL-SCNC: 137 MMOL/L — SIGNIFICANT CHANGE UP (ref 135–145)
SP GR SPEC: 1.01 — SIGNIFICANT CHANGE UP (ref 1–1.03)
UROBILINOGEN FLD QL: 0.2 MG/DL — SIGNIFICANT CHANGE UP (ref 0.2–1)
WBC # BLD: 9.27 K/UL — SIGNIFICANT CHANGE UP (ref 3.8–10.5)
WBC # FLD AUTO: 9.27 K/UL — SIGNIFICANT CHANGE UP (ref 3.8–10.5)

## 2025-04-10 PROCEDURE — 93010 ELECTROCARDIOGRAM REPORT: CPT

## 2025-04-10 PROCEDURE — 72125 CT NECK SPINE W/O DYE: CPT | Mod: 26

## 2025-04-10 PROCEDURE — 72132 CT LUMBAR SPINE W/DYE: CPT | Mod: 26

## 2025-04-10 PROCEDURE — 99285 EMERGENCY DEPT VISIT HI MDM: CPT

## 2025-04-10 PROCEDURE — 72158 MRI LUMBAR SPINE W/O & W/DYE: CPT | Mod: 26

## 2025-04-10 PROCEDURE — 72128 CT CHEST SPINE W/O DYE: CPT | Mod: 26

## 2025-04-10 PROCEDURE — 99053 MED SERV 10PM-8AM 24 HR FAC: CPT

## 2025-04-10 RX ORDER — INSULIN GLARGINE-YFGN 100 [IU]/ML
10 INJECTION, SOLUTION SUBCUTANEOUS AT BEDTIME
Refills: 0 | Status: DISCONTINUED | OUTPATIENT
Start: 2025-04-10 | End: 2025-04-12

## 2025-04-10 RX ORDER — DEXTROSE 50 % IN WATER 50 %
15 SYRINGE (ML) INTRAVENOUS ONCE
Refills: 0 | Status: DISCONTINUED | OUTPATIENT
Start: 2025-04-10 | End: 2025-04-12

## 2025-04-10 RX ORDER — DEXTROSE 50 % IN WATER 50 %
12.5 SYRINGE (ML) INTRAVENOUS ONCE
Refills: 0 | Status: DISCONTINUED | OUTPATIENT
Start: 2025-04-10 | End: 2025-04-12

## 2025-04-10 RX ORDER — SODIUM CHLORIDE 9 G/1000ML
1000 INJECTION, SOLUTION INTRAVENOUS
Refills: 0 | Status: DISCONTINUED | OUTPATIENT
Start: 2025-04-10 | End: 2025-04-12

## 2025-04-10 RX ORDER — LISINOPRIL 5 MG/1
20 TABLET ORAL DAILY
Refills: 0 | Status: DISCONTINUED | OUTPATIENT
Start: 2025-04-10 | End: 2025-04-12

## 2025-04-10 RX ORDER — DULOXETINE 20 MG/1
1 CAPSULE, DELAYED RELEASE ORAL
Refills: 0 | DISCHARGE

## 2025-04-10 RX ORDER — SODIUM CHLORIDE 9 G/1000ML
1000 INJECTION, SOLUTION INTRAVENOUS
Refills: 0 | Status: DISCONTINUED | OUTPATIENT
Start: 2025-04-10 | End: 2025-04-10

## 2025-04-10 RX ORDER — DEXTROSE 50 % IN WATER 50 %
12.5 SYRINGE (ML) INTRAVENOUS ONCE
Refills: 0 | Status: DISCONTINUED | OUTPATIENT
Start: 2025-04-10 | End: 2025-04-10

## 2025-04-10 RX ORDER — DEXTROSE 50 % IN WATER 50 %
25 SYRINGE (ML) INTRAVENOUS ONCE
Refills: 0 | Status: DISCONTINUED | OUTPATIENT
Start: 2025-04-10 | End: 2025-04-10

## 2025-04-10 RX ORDER — DEXTROSE 50 % IN WATER 50 %
25 SYRINGE (ML) INTRAVENOUS ONCE
Refills: 0 | Status: DISCONTINUED | OUTPATIENT
Start: 2025-04-10 | End: 2025-04-12

## 2025-04-10 RX ORDER — GLUCAGON 3 MG/1
1 POWDER NASAL ONCE
Refills: 0 | Status: DISCONTINUED | OUTPATIENT
Start: 2025-04-10 | End: 2025-04-12

## 2025-04-10 RX ORDER — INSULIN GLARGINE-YFGN 100 [IU]/ML
8 INJECTION, SOLUTION SUBCUTANEOUS AT BEDTIME
Refills: 0 | Status: DISCONTINUED | OUTPATIENT
Start: 2025-04-10 | End: 2025-04-10

## 2025-04-10 RX ORDER — CELECOXIB 50 MG/1
200 CAPSULE ORAL DAILY
Refills: 0 | Status: DISCONTINUED | OUTPATIENT
Start: 2025-04-10 | End: 2025-04-12

## 2025-04-10 RX ORDER — LORAZEPAM 4 MG/ML
1 VIAL (ML) INJECTION ONCE
Refills: 0 | Status: DISCONTINUED | OUTPATIENT
Start: 2025-04-10 | End: 2025-04-10

## 2025-04-10 RX ORDER — INSULIN LISPRO 100 U/ML
INJECTION, SOLUTION INTRAVENOUS; SUBCUTANEOUS
Refills: 0 | Status: DISCONTINUED | OUTPATIENT
Start: 2025-04-10 | End: 2025-04-12

## 2025-04-10 RX ORDER — AMITRIPTYLINE HYDROCHLORIDE 25 MG/1
50 TABLET, FILM COATED ORAL AT BEDTIME
Refills: 0 | Status: DISCONTINUED | OUTPATIENT
Start: 2025-04-10 | End: 2025-04-10

## 2025-04-10 RX ORDER — ATORVASTATIN CALCIUM 80 MG/1
40 TABLET, FILM COATED ORAL AT BEDTIME
Refills: 0 | Status: DISCONTINUED | OUTPATIENT
Start: 2025-04-10 | End: 2025-04-10

## 2025-04-10 RX ORDER — GLUCAGON 3 MG/1
1 POWDER NASAL ONCE
Refills: 0 | Status: DISCONTINUED | OUTPATIENT
Start: 2025-04-10 | End: 2025-04-10

## 2025-04-10 RX ORDER — METFORMIN HYDROCHLORIDE 850 MG/1
1000 TABLET ORAL
Refills: 0 | Status: DISCONTINUED | OUTPATIENT
Start: 2025-04-10 | End: 2025-04-10

## 2025-04-10 RX ORDER — POLYETHYLENE GLYCOL 3350 17 G/17G
17 POWDER, FOR SOLUTION ORAL DAILY
Refills: 0 | Status: DISCONTINUED | OUTPATIENT
Start: 2025-04-10 | End: 2025-04-12

## 2025-04-10 RX ORDER — GABAPENTIN 400 MG/1
600 CAPSULE ORAL THREE TIMES A DAY
Refills: 0 | Status: DISCONTINUED | OUTPATIENT
Start: 2025-04-10 | End: 2025-04-12

## 2025-04-10 RX ORDER — TRAMADOL HYDROCHLORIDE 50 MG/1
50 TABLET, FILM COATED ORAL EVERY 6 HOURS
Refills: 0 | Status: DISCONTINUED | OUTPATIENT
Start: 2025-04-10 | End: 2025-04-12

## 2025-04-10 RX ORDER — DEXTROSE 50 % IN WATER 50 %
15 SYRINGE (ML) INTRAVENOUS ONCE
Refills: 0 | Status: DISCONTINUED | OUTPATIENT
Start: 2025-04-10 | End: 2025-04-10

## 2025-04-10 RX ORDER — INSULIN DEGLUDEC 100 U/ML
10 INJECTION, SOLUTION SUBCUTANEOUS
Refills: 0 | DISCHARGE

## 2025-04-10 RX ORDER — DULOXETINE 20 MG/1
30 CAPSULE, DELAYED RELEASE ORAL
Refills: 0 | Status: DISCONTINUED | OUTPATIENT
Start: 2025-04-10 | End: 2025-04-12

## 2025-04-10 RX ORDER — ACETAMINOPHEN 500 MG/5ML
1000 LIQUID (ML) ORAL ONCE
Refills: 0 | Status: COMPLETED | OUTPATIENT
Start: 2025-04-10 | End: 2025-04-10

## 2025-04-10 RX ORDER — GABAPENTIN 400 MG/1
600 CAPSULE ORAL ONCE
Refills: 0 | Status: COMPLETED | OUTPATIENT
Start: 2025-04-10 | End: 2025-04-10

## 2025-04-10 RX ADMIN — INSULIN GLARGINE-YFGN 10 UNIT(S): 100 INJECTION, SOLUTION SUBCUTANEOUS at 22:17

## 2025-04-10 RX ADMIN — TRAMADOL HYDROCHLORIDE 50 MILLIGRAM(S): 50 TABLET, FILM COATED ORAL at 22:08

## 2025-04-10 RX ADMIN — INSULIN LISPRO 6: 100 INJECTION, SOLUTION INTRAVENOUS; SUBCUTANEOUS at 18:00

## 2025-04-10 RX ADMIN — LISINOPRIL 20 MILLIGRAM(S): 5 TABLET ORAL at 10:38

## 2025-04-10 RX ADMIN — Medication 4 MILLIGRAM(S): at 02:10

## 2025-04-10 RX ADMIN — Medication 400 MILLIGRAM(S): at 09:53

## 2025-04-10 RX ADMIN — GABAPENTIN 600 MILLIGRAM(S): 400 CAPSULE ORAL at 12:45

## 2025-04-10 RX ADMIN — Medication 1 MILLIGRAM(S): at 14:41

## 2025-04-10 RX ADMIN — Medication 40 MILLIGRAM(S): at 10:38

## 2025-04-10 RX ADMIN — TRAMADOL HYDROCHLORIDE 50 MILLIGRAM(S): 50 TABLET, FILM COATED ORAL at 21:08

## 2025-04-10 RX ADMIN — DULOXETINE 30 MILLIGRAM(S): 20 CAPSULE, DELAYED RELEASE ORAL at 18:00

## 2025-04-10 RX ADMIN — CELECOXIB 200 MILLIGRAM(S): 50 CAPSULE ORAL at 12:51

## 2025-04-10 RX ADMIN — GABAPENTIN 600 MILLIGRAM(S): 400 CAPSULE ORAL at 21:08

## 2025-04-11 LAB
A1C WITH ESTIMATED AVERAGE GLUCOSE RESULT: 8.4 % — HIGH (ref 4–5.6)
ANION GAP SERPL CALC-SCNC: 10 MMOL/L — SIGNIFICANT CHANGE UP (ref 5–17)
BUN SERPL-MCNC: 17 MG/DL — SIGNIFICANT CHANGE UP (ref 7–23)
CALCIUM SERPL-MCNC: 9.6 MG/DL — SIGNIFICANT CHANGE UP (ref 8.4–10.5)
CHLORIDE SERPL-SCNC: 104 MMOL/L — SIGNIFICANT CHANGE UP (ref 96–108)
CO2 SERPL-SCNC: 25 MMOL/L — SIGNIFICANT CHANGE UP (ref 22–31)
CREAT SERPL-MCNC: 0.71 MG/DL — SIGNIFICANT CHANGE UP (ref 0.5–1.3)
EGFR: 88 ML/MIN/1.73M2 — SIGNIFICANT CHANGE UP
EGFR: 88 ML/MIN/1.73M2 — SIGNIFICANT CHANGE UP
ESTIMATED AVERAGE GLUCOSE: 194 MG/DL — HIGH (ref 68–114)
GLUCOSE SERPL-MCNC: 176 MG/DL — HIGH (ref 70–99)
HCT VFR BLD CALC: 29.3 % — LOW (ref 34.5–45)
HGB BLD-MCNC: 9.5 G/DL — LOW (ref 11.5–15.5)
MCHC RBC-ENTMCNC: 26.9 PG — LOW (ref 27–34)
MCHC RBC-ENTMCNC: 32.4 G/DL — SIGNIFICANT CHANGE UP (ref 32–36)
MCV RBC AUTO: 83 FL — SIGNIFICANT CHANGE UP (ref 80–100)
NRBC BLD AUTO-RTO: 0 /100 WBCS — SIGNIFICANT CHANGE UP (ref 0–0)
PLATELET # BLD AUTO: 321 K/UL — SIGNIFICANT CHANGE UP (ref 150–400)
POTASSIUM SERPL-MCNC: 4.3 MMOL/L — SIGNIFICANT CHANGE UP (ref 3.5–5.3)
POTASSIUM SERPL-SCNC: 4.3 MMOL/L — SIGNIFICANT CHANGE UP (ref 3.5–5.3)
RBC # BLD: 3.53 M/UL — LOW (ref 3.8–5.2)
RBC # FLD: 18.5 % — HIGH (ref 10.3–14.5)
SODIUM SERPL-SCNC: 139 MMOL/L — SIGNIFICANT CHANGE UP (ref 135–145)
WBC # BLD: 6.55 K/UL — SIGNIFICANT CHANGE UP (ref 3.8–10.5)
WBC # FLD AUTO: 6.55 K/UL — SIGNIFICANT CHANGE UP (ref 3.8–10.5)

## 2025-04-11 PROCEDURE — 99223 1ST HOSP IP/OBS HIGH 75: CPT

## 2025-04-11 RX ORDER — POLYETHYLENE GLYCOL 3350 17 G/17G
17 POWDER, FOR SOLUTION ORAL
Qty: 0 | Refills: 0 | DISCHARGE
Start: 2025-04-11

## 2025-04-11 RX ORDER — METHYLPREDNISOLONE ACETATE 80 MG/ML
1 INJECTION, SUSPENSION INTRA-ARTICULAR; INTRALESIONAL; INTRAMUSCULAR; SOFT TISSUE
Qty: 1 | Refills: 0
Start: 2025-04-11 | End: 2025-04-15

## 2025-04-11 RX ORDER — DEXAMETHASONE 0.5 MG/1
6 TABLET ORAL EVERY 6 HOURS
Refills: 0 | Status: DISCONTINUED | OUTPATIENT
Start: 2025-04-11 | End: 2025-04-12

## 2025-04-11 RX ADMIN — CELECOXIB 200 MILLIGRAM(S): 50 CAPSULE ORAL at 11:49

## 2025-04-11 RX ADMIN — DEXAMETHASONE 6 MILLIGRAM(S): 0.5 TABLET ORAL at 09:58

## 2025-04-11 RX ADMIN — DULOXETINE 30 MILLIGRAM(S): 20 CAPSULE, DELAYED RELEASE ORAL at 18:31

## 2025-04-11 RX ADMIN — DEXAMETHASONE 6 MILLIGRAM(S): 0.5 TABLET ORAL at 21:16

## 2025-04-11 RX ADMIN — INSULIN LISPRO 8: 100 INJECTION, SOLUTION INTRAVENOUS; SUBCUTANEOUS at 22:11

## 2025-04-11 RX ADMIN — DULOXETINE 30 MILLIGRAM(S): 20 CAPSULE, DELAYED RELEASE ORAL at 05:21

## 2025-04-11 RX ADMIN — LISINOPRIL 20 MILLIGRAM(S): 5 TABLET ORAL at 05:21

## 2025-04-11 RX ADMIN — DEXAMETHASONE 6 MILLIGRAM(S): 0.5 TABLET ORAL at 16:13

## 2025-04-11 RX ADMIN — INSULIN GLARGINE-YFGN 10 UNIT(S): 100 INJECTION, SOLUTION SUBCUTANEOUS at 22:11

## 2025-04-11 RX ADMIN — TRAMADOL HYDROCHLORIDE 50 MILLIGRAM(S): 50 TABLET, FILM COATED ORAL at 21:16

## 2025-04-11 RX ADMIN — Medication 40 MILLIGRAM(S): at 05:22

## 2025-04-11 RX ADMIN — INSULIN LISPRO 2: 100 INJECTION, SOLUTION INTRAVENOUS; SUBCUTANEOUS at 07:11

## 2025-04-11 RX ADMIN — GABAPENTIN 600 MILLIGRAM(S): 400 CAPSULE ORAL at 05:22

## 2025-04-11 RX ADMIN — TRAMADOL HYDROCHLORIDE 50 MILLIGRAM(S): 50 TABLET, FILM COATED ORAL at 22:16

## 2025-04-11 RX ADMIN — CELECOXIB 200 MILLIGRAM(S): 50 CAPSULE ORAL at 11:58

## 2025-04-11 RX ADMIN — GABAPENTIN 600 MILLIGRAM(S): 400 CAPSULE ORAL at 21:16

## 2025-04-11 RX ADMIN — INSULIN LISPRO 4: 100 INJECTION, SOLUTION INTRAVENOUS; SUBCUTANEOUS at 11:49

## 2025-04-11 RX ADMIN — INSULIN LISPRO 10: 100 INJECTION, SOLUTION INTRAVENOUS; SUBCUTANEOUS at 18:30

## 2025-04-12 ENCOUNTER — TRANSCRIPTION ENCOUNTER (OUTPATIENT)
Age: 75
End: 2025-04-12

## 2025-04-12 VITALS
TEMPERATURE: 98 F | RESPIRATION RATE: 18 BRPM | SYSTOLIC BLOOD PRESSURE: 121 MMHG | HEART RATE: 78 BPM | DIASTOLIC BLOOD PRESSURE: 67 MMHG | OXYGEN SATURATION: 97 %

## 2025-04-12 PROCEDURE — 72125 CT NECK SPINE W/O DYE: CPT | Mod: MC

## 2025-04-12 PROCEDURE — 72158 MRI LUMBAR SPINE W/O & W/DYE: CPT | Mod: MC

## 2025-04-12 PROCEDURE — 72128 CT CHEST SPINE W/O DYE: CPT | Mod: MC

## 2025-04-12 PROCEDURE — 85025 COMPLETE CBC W/AUTO DIFF WBC: CPT

## 2025-04-12 PROCEDURE — 81001 URINALYSIS AUTO W/SCOPE: CPT

## 2025-04-12 PROCEDURE — 80048 BASIC METABOLIC PNL TOTAL CA: CPT

## 2025-04-12 PROCEDURE — A9585: CPT

## 2025-04-12 PROCEDURE — 82962 GLUCOSE BLOOD TEST: CPT

## 2025-04-12 PROCEDURE — 86901 BLOOD TYPING SEROLOGIC RH(D): CPT

## 2025-04-12 PROCEDURE — 85610 PROTHROMBIN TIME: CPT

## 2025-04-12 PROCEDURE — 83036 HEMOGLOBIN GLYCOSYLATED A1C: CPT

## 2025-04-12 PROCEDURE — 86900 BLOOD TYPING SEROLOGIC ABO: CPT

## 2025-04-12 PROCEDURE — 86850 RBC ANTIBODY SCREEN: CPT

## 2025-04-12 PROCEDURE — 85730 THROMBOPLASTIN TIME PARTIAL: CPT

## 2025-04-12 PROCEDURE — 36415 COLL VENOUS BLD VENIPUNCTURE: CPT

## 2025-04-12 PROCEDURE — 96374 THER/PROPH/DIAG INJ IV PUSH: CPT

## 2025-04-12 PROCEDURE — 72132 CT LUMBAR SPINE W/DYE: CPT | Mod: MC

## 2025-04-12 PROCEDURE — 93005 ELECTROCARDIOGRAM TRACING: CPT

## 2025-04-12 PROCEDURE — 99285 EMERGENCY DEPT VISIT HI MDM: CPT | Mod: 25

## 2025-04-12 PROCEDURE — 85027 COMPLETE CBC AUTOMATED: CPT

## 2025-04-12 PROCEDURE — 87086 URINE CULTURE/COLONY COUNT: CPT

## 2025-04-12 RX ORDER — TRAMADOL HYDROCHLORIDE 50 MG/1
1 TABLET, FILM COATED ORAL
Qty: 28 | Refills: 0
Start: 2025-04-12 | End: 2025-04-18

## 2025-04-12 RX ORDER — METHYLPREDNISOLONE ACETATE 80 MG/ML
1 INJECTION, SUSPENSION INTRA-ARTICULAR; INTRALESIONAL; INTRAMUSCULAR; SOFT TISSUE
Qty: 1 | Refills: 0
Start: 2025-04-12 | End: 2025-04-16

## 2025-04-12 RX ORDER — INSULIN GLARGINE-YFGN 100 [IU]/ML
15 INJECTION, SOLUTION SUBCUTANEOUS
Qty: 0 | Refills: 0 | DISCHARGE
Start: 2025-04-12

## 2025-04-12 RX ORDER — INSULIN GLARGINE-YFGN 100 [IU]/ML
15 INJECTION, SOLUTION SUBCUTANEOUS AT BEDTIME
Refills: 0 | Status: DISCONTINUED | OUTPATIENT
Start: 2025-04-12 | End: 2025-04-12

## 2025-04-12 RX ADMIN — INSULIN LISPRO 6: 100 INJECTION, SOLUTION INTRAVENOUS; SUBCUTANEOUS at 17:22

## 2025-04-12 RX ADMIN — DEXAMETHASONE 6 MILLIGRAM(S): 0.5 TABLET ORAL at 05:16

## 2025-04-12 RX ADMIN — GABAPENTIN 600 MILLIGRAM(S): 400 CAPSULE ORAL at 05:15

## 2025-04-12 RX ADMIN — DEXAMETHASONE 6 MILLIGRAM(S): 0.5 TABLET ORAL at 11:50

## 2025-04-12 RX ADMIN — CELECOXIB 200 MILLIGRAM(S): 50 CAPSULE ORAL at 12:42

## 2025-04-12 RX ADMIN — CELECOXIB 200 MILLIGRAM(S): 50 CAPSULE ORAL at 11:50

## 2025-04-12 RX ADMIN — DULOXETINE 30 MILLIGRAM(S): 20 CAPSULE, DELAYED RELEASE ORAL at 17:22

## 2025-04-12 RX ADMIN — LISINOPRIL 20 MILLIGRAM(S): 5 TABLET ORAL at 05:15

## 2025-04-12 RX ADMIN — GABAPENTIN 600 MILLIGRAM(S): 400 CAPSULE ORAL at 14:44

## 2025-04-12 RX ADMIN — INSULIN LISPRO 6: 100 INJECTION, SOLUTION INTRAVENOUS; SUBCUTANEOUS at 07:10

## 2025-04-12 RX ADMIN — DULOXETINE 30 MILLIGRAM(S): 20 CAPSULE, DELAYED RELEASE ORAL at 05:15

## 2025-04-12 RX ADMIN — DEXAMETHASONE 6 MILLIGRAM(S): 0.5 TABLET ORAL at 17:21

## 2025-04-12 RX ADMIN — INSULIN LISPRO 6: 100 INJECTION, SOLUTION INTRAVENOUS; SUBCUTANEOUS at 12:39

## 2025-04-12 RX ADMIN — Medication 40 MILLIGRAM(S): at 05:16

## 2025-04-17 DIAGNOSIS — Z88.5 ALLERGY STATUS TO NARCOTIC AGENT: ICD-10-CM

## 2025-04-17 DIAGNOSIS — E11.22 TYPE 2 DIABETES MELLITUS WITH DIABETIC CHRONIC KIDNEY DISEASE: ICD-10-CM

## 2025-04-17 DIAGNOSIS — M19.90 UNSPECIFIED OSTEOARTHRITIS, UNSPECIFIED SITE: ICD-10-CM

## 2025-04-17 DIAGNOSIS — Z79.84 LONG TERM (CURRENT) USE OF ORAL HYPOGLYCEMIC DRUGS: ICD-10-CM

## 2025-04-17 DIAGNOSIS — M48.061 SPINAL STENOSIS, LUMBAR REGION WITHOUT NEUROGENIC CLAUDICATION: ICD-10-CM

## 2025-04-17 DIAGNOSIS — K21.9 GASTRO-ESOPHAGEAL REFLUX DISEASE WITHOUT ESOPHAGITIS: ICD-10-CM

## 2025-04-17 DIAGNOSIS — E78.5 HYPERLIPIDEMIA, UNSPECIFIED: ICD-10-CM

## 2025-04-17 DIAGNOSIS — E11.65 TYPE 2 DIABETES MELLITUS WITH HYPERGLYCEMIA: ICD-10-CM

## 2025-04-17 DIAGNOSIS — Z90.710 ACQUIRED ABSENCE OF BOTH CERVIX AND UTERUS: ICD-10-CM

## 2025-04-17 DIAGNOSIS — I12.9 HYPERTENSIVE CHRONIC KIDNEY DISEASE WITH STAGE 1 THROUGH STAGE 4 CHRONIC KIDNEY DISEASE, OR UNSPECIFIED CHRONIC KIDNEY DISEASE: ICD-10-CM

## 2025-04-17 DIAGNOSIS — Z91.81 HISTORY OF FALLING: ICD-10-CM

## 2025-04-17 DIAGNOSIS — M51.372 OTHER INTERVERTEBRAL DISC DEGENERATION, LUMBOSACRAL REGION WITH DISCOGENIC BACK PAIN AND LOWER EXTREMITY PAIN: ICD-10-CM

## 2025-04-24 ENCOUNTER — APPOINTMENT (OUTPATIENT)
Dept: RHEUMATOLOGY | Facility: CLINIC | Age: 75
End: 2025-04-24

## 2025-05-01 ENCOUNTER — APPOINTMENT (OUTPATIENT)
Dept: RHEUMATOLOGY | Facility: CLINIC | Age: 75
End: 2025-05-01

## 2025-06-30 NOTE — PROGRESS NOTE ADULT - PROBLEM SELECTOR PROBLEM 4
No protocol for requested medication     Medication: oxycodone 10 mg imm. release  Last office visit date: 9/24/24  Pharmacy: [facility]    Order pended, routed to clinician for review.   
Anemia due to acute blood loss
Anemia due to acute blood loss

## (undated) DEVICE — SUT STRATAFIX SYMMETRIC PDS 1 45CM OS-6

## (undated) DEVICE — SUT VICRYL 2-0 27" CT-1 UNDYED

## (undated) DEVICE — STAPLER SKIN PROXIMATE

## (undated) DEVICE — SUT VICRYL 2-0 27" SH UNDYED

## (undated) DEVICE — NDL SPINAL 18G X 3.5" (PINK)

## (undated) DEVICE — WARMING BLANKET UPPER ADULT

## (undated) DEVICE — PREP CHLOROHEXIDINE 4% 118CC KIT

## (undated) DEVICE — PREP DURAPREP 26CC

## (undated) DEVICE — LAVAGE SIMPLUSE

## (undated) DEVICE — DRAPE 3/4 SHEET 52X76"

## (undated) DEVICE — GLV 9 PROTEXIS (WHITE)

## (undated) DEVICE — PACK SPINE

## (undated) DEVICE — MIDAS REX MR8 MATCH HEAD FLUTED LG BORE 3MM X 14CM

## (undated) DEVICE — DRAPE BACK TABLE COVER 80X90"

## (undated) DEVICE — STRYKER BONE MILL BLADE MEDIUM 5.0MM

## (undated) DEVICE — DRAPE INSTRUMENT POUCH 6.75" X 11"

## (undated) DEVICE — DRAPE 1/2 SHEET 40X57"

## (undated) DEVICE — MIDAS REX MR8 BALL FLUTED LG BORE 5MM X 14CM

## (undated) DEVICE — VENODYNE/SCD SLEEVE CALF MEDIUM